# Patient Record
Sex: FEMALE | Race: WHITE | Employment: OTHER | URBAN - METROPOLITAN AREA
[De-identification: names, ages, dates, MRNs, and addresses within clinical notes are randomized per-mention and may not be internally consistent; named-entity substitution may affect disease eponyms.]

---

## 2017-01-31 DIAGNOSIS — I10 HTN (HYPERTENSION), BENIGN: ICD-10-CM

## 2017-01-31 RX ORDER — METOPROLOL SUCCINATE 25 MG/1
TABLET, EXTENDED RELEASE ORAL
Qty: 90 TABLET | Refills: 0 | Status: SHIPPED | OUTPATIENT
Start: 2017-01-31 | End: 2017-03-17 | Stop reason: SDUPTHER

## 2017-01-31 RX ORDER — THYROID 30 MG/1
TABLET ORAL
Qty: 90 TABLET | Refills: 0 | Status: SHIPPED | OUTPATIENT
Start: 2017-01-31 | End: 2017-03-17 | Stop reason: SDUPTHER

## 2017-03-01 DIAGNOSIS — E78.2 MIXED HYPERLIPIDEMIA: ICD-10-CM

## 2017-03-02 RX ORDER — SIMVASTATIN 10 MG
TABLET ORAL
Qty: 30 TABLET | Refills: 5 | Status: SHIPPED | OUTPATIENT
Start: 2017-03-02 | End: 2017-03-17 | Stop reason: SDUPTHER

## 2017-03-17 ENCOUNTER — HOSPITAL ENCOUNTER (OUTPATIENT)
Dept: CT IMAGING | Age: 62
Discharge: HOME | End: 2017-03-17
Payer: COMMERCIAL

## 2017-03-17 ENCOUNTER — OFFICE VISIT (OUTPATIENT)
Dept: PRIMARY CARE CLINIC | Age: 62
End: 2017-03-17

## 2017-03-17 VITALS
HEIGHT: 62 IN | RESPIRATION RATE: 14 BRPM | BODY MASS INDEX: 31.83 KG/M2 | SYSTOLIC BLOOD PRESSURE: 118 MMHG | HEART RATE: 72 BPM | WEIGHT: 173 LBS | DIASTOLIC BLOOD PRESSURE: 74 MMHG | TEMPERATURE: 98.3 F

## 2017-03-17 DIAGNOSIS — R10.12 LEFT UPPER QUADRANT PAIN: Primary | ICD-10-CM

## 2017-03-17 DIAGNOSIS — I10 HTN (HYPERTENSION), BENIGN: ICD-10-CM

## 2017-03-17 DIAGNOSIS — F51.01 PRIMARY INSOMNIA: ICD-10-CM

## 2017-03-17 DIAGNOSIS — H53.8 BLURRY VISION: ICD-10-CM

## 2017-03-17 DIAGNOSIS — R10.12 LEFT UPPER QUADRANT PAIN: ICD-10-CM

## 2017-03-17 DIAGNOSIS — E78.2 MIXED HYPERLIPIDEMIA: ICD-10-CM

## 2017-03-17 DIAGNOSIS — E03.9 HYPOTHYROIDISM (ACQUIRED): ICD-10-CM

## 2017-03-17 DIAGNOSIS — G58.9 MONONEUROPATHY: ICD-10-CM

## 2017-03-17 DIAGNOSIS — F32.0 MILD SINGLE CURRENT EPISODE OF MAJOR DEPRESSIVE DISORDER (HCC): ICD-10-CM

## 2017-03-17 DIAGNOSIS — R73.9 HYPERGLYCEMIA: ICD-10-CM

## 2017-03-17 DIAGNOSIS — E55.9 VITAMIN D DEFICIENCY: ICD-10-CM

## 2017-03-17 LAB — GLUCOSE BLD-MCNC: 99 MG/DL

## 2017-03-17 PROCEDURE — 1036F TOBACCO NON-USER: CPT | Performed by: FAMILY MEDICINE

## 2017-03-17 PROCEDURE — 99173 VISUAL ACUITY SCREEN: CPT | Performed by: FAMILY MEDICINE

## 2017-03-17 PROCEDURE — G8484 FLU IMMUNIZE NO ADMIN: HCPCS | Performed by: FAMILY MEDICINE

## 2017-03-17 PROCEDURE — 3017F COLORECTAL CA SCREEN DOC REV: CPT | Performed by: FAMILY MEDICINE

## 2017-03-17 PROCEDURE — 82962 GLUCOSE BLOOD TEST: CPT | Performed by: FAMILY MEDICINE

## 2017-03-17 PROCEDURE — G8427 DOCREV CUR MEDS BY ELIG CLIN: HCPCS | Performed by: FAMILY MEDICINE

## 2017-03-17 PROCEDURE — 74176 CT ABD & PELVIS W/O CONTRAST: CPT

## 2017-03-17 PROCEDURE — 2500000003 HC RX 250 WO HCPCS: Performed by: RADIOLOGY

## 2017-03-17 PROCEDURE — G8598 ASA/ANTIPLAT THER USED: HCPCS | Performed by: FAMILY MEDICINE

## 2017-03-17 PROCEDURE — 3014F SCREEN MAMMO DOC REV: CPT | Performed by: FAMILY MEDICINE

## 2017-03-17 PROCEDURE — 99214 OFFICE O/P EST MOD 30 MIN: CPT | Performed by: FAMILY MEDICINE

## 2017-03-17 PROCEDURE — G8419 CALC BMI OUT NRM PARAM NOF/U: HCPCS | Performed by: FAMILY MEDICINE

## 2017-03-17 RX ORDER — TRIAMTERENE AND HYDROCHLOROTHIAZIDE 75; 50 MG/1; MG/1
TABLET ORAL
Qty: 90 TABLET | Refills: 1 | Status: SHIPPED | OUTPATIENT
Start: 2017-03-17 | End: 2018-04-09 | Stop reason: SDUPTHER

## 2017-03-17 RX ORDER — ERGOCALCIFEROL 1.25 MG/1
50000 CAPSULE ORAL WEEKLY
Qty: 12 CAPSULE | Refills: 0 | Status: SHIPPED | OUTPATIENT
Start: 2017-03-17 | End: 2018-04-09 | Stop reason: SDUPTHER

## 2017-03-17 RX ORDER — ZOLPIDEM TARTRATE 5 MG/1
5 TABLET ORAL NIGHTLY PRN
Qty: 90 TABLET | Refills: 1 | Status: SHIPPED | OUTPATIENT
Start: 2017-03-17 | End: 2017-10-25 | Stop reason: SDUPTHER

## 2017-03-17 RX ORDER — SIMVASTATIN 10 MG
TABLET ORAL
Qty: 90 TABLET | Refills: 1 | Status: SHIPPED | OUTPATIENT
Start: 2017-03-17 | End: 2017-10-02 | Stop reason: SDUPTHER

## 2017-03-17 RX ORDER — METOPROLOL SUCCINATE 25 MG/1
TABLET, EXTENDED RELEASE ORAL
Qty: 90 TABLET | Refills: 1 | Status: SHIPPED | OUTPATIENT
Start: 2017-03-17 | End: 2017-11-07 | Stop reason: SDUPTHER

## 2017-03-17 RX ORDER — GABAPENTIN 600 MG/1
600 TABLET ORAL DAILY
Qty: 90 TABLET | Refills: 1 | Status: SHIPPED | OUTPATIENT
Start: 2017-03-17 | End: 2017-05-22 | Stop reason: SDUPTHER

## 2017-03-17 RX ORDER — LEVOTHYROXINE AND LIOTHYRONINE 19; 4.5 UG/1; UG/1
TABLET ORAL
Qty: 90 TABLET | Refills: 1 | Status: SHIPPED | OUTPATIENT
Start: 2017-03-17 | End: 2017-10-02 | Stop reason: SDUPTHER

## 2017-03-17 RX ADMIN — BARIUM SULFATE 450 ML: 21 SUSPENSION ORAL at 13:30

## 2017-03-17 ASSESSMENT — ENCOUNTER SYMPTOMS
PHOTOPHOBIA: 0
CONSTIPATION: 0
EYE REDNESS: 0
ABDOMINAL PAIN: 1
WHEEZING: 0
ABDOMINAL DISTENTION: 0
DIARRHEA: 1
EYE ITCHING: 0
VOMITING: 0
RESPIRATORY NEGATIVE: 1
BACK PAIN: 0
SHORTNESS OF BREATH: 0
NAUSEA: 0

## 2017-03-24 ENCOUNTER — TELEPHONE (OUTPATIENT)
Dept: PRIMARY CARE CLINIC | Age: 62
End: 2017-03-24

## 2017-03-24 DIAGNOSIS — R19.7 DIARRHEA, UNSPECIFIED TYPE: ICD-10-CM

## 2017-03-24 DIAGNOSIS — N28.1 RENAL CYST, LEFT: Primary | ICD-10-CM

## 2017-03-30 ENCOUNTER — HOSPITAL ENCOUNTER (OUTPATIENT)
Dept: ULTRASOUND IMAGING | Age: 62
Discharge: HOME | End: 2017-03-30
Payer: COMMERCIAL

## 2017-03-30 DIAGNOSIS — N28.1 RENAL CYST, LEFT: ICD-10-CM

## 2017-03-30 PROCEDURE — 76705 ECHO EXAM OF ABDOMEN: CPT

## 2017-04-03 ENCOUNTER — TELEPHONE (OUTPATIENT)
Dept: PRIMARY CARE CLINIC | Age: 62
End: 2017-04-03

## 2017-04-05 RX ORDER — PREDNISONE 10 MG/1
TABLET ORAL
Qty: 24 TABLET | Refills: 0 | Status: SHIPPED | OUTPATIENT
Start: 2017-04-05 | End: 2017-04-19

## 2017-04-18 ENCOUNTER — HOSPITAL ENCOUNTER (OUTPATIENT)
Dept: LAB | Age: 62
Discharge: HOME | End: 2017-04-18
Payer: COMMERCIAL

## 2017-04-18 DIAGNOSIS — E78.2 MIXED HYPERLIPIDEMIA: ICD-10-CM

## 2017-04-18 LAB
ALBUMIN SERPL-MCNC: 4 G/DL (ref 3.9–4.9)
ALP BLD-CCNC: 82 U/L (ref 40–130)
ALT SERPL-CCNC: 19 U/L (ref 0–33)
ANION GAP SERPL CALCULATED.3IONS-SCNC: 13 MEQ/L (ref 7–13)
AST SERPL-CCNC: 15 U/L (ref 0–35)
BILIRUB SERPL-MCNC: 0.3 MG/DL (ref 0–1.2)
BUN BLDV-MCNC: 25 MG/DL (ref 8–23)
CALCIUM SERPL-MCNC: 9.2 MG/DL (ref 8.6–10.2)
CHLORIDE BLD-SCNC: 102 MEQ/L (ref 98–107)
CHOLESTEROL, TOTAL: 215 MG/DL (ref 0–199)
CO2: 28 MEQ/L (ref 22–29)
CREAT SERPL-MCNC: 1.08 MG/DL (ref 0.5–0.9)
GFR AFRICAN AMERICAN: >60
GFR NON-AFRICAN AMERICAN: 51.5
GLOBULIN: 3 G/DL (ref 2.3–3.5)
GLUCOSE BLD-MCNC: 101 MG/DL (ref 74–109)
HDLC SERPL-MCNC: 72 MG/DL (ref 40–59)
LDL CHOLESTEROL CALCULATED: 103 MG/DL (ref 0–129)
POTASSIUM SERPL-SCNC: 3.4 MEQ/L (ref 3.5–5.1)
SODIUM BLD-SCNC: 143 MEQ/L (ref 132–144)
TOTAL PROTEIN: 7 G/DL (ref 6.4–8.1)
TRIGL SERPL-MCNC: 201 MG/DL (ref 0–200)

## 2017-04-18 PROCEDURE — 80061 LIPID PANEL: CPT

## 2017-04-18 PROCEDURE — 36415 COLL VENOUS BLD VENIPUNCTURE: CPT

## 2017-04-18 PROCEDURE — 80053 COMPREHEN METABOLIC PANEL: CPT

## 2017-05-22 DIAGNOSIS — G58.9 MONONEUROPATHY: ICD-10-CM

## 2017-05-22 RX ORDER — GABAPENTIN 600 MG/1
1200 TABLET ORAL 3 TIMES DAILY
Qty: 540 TABLET | Refills: 1 | Status: SHIPPED | OUTPATIENT
Start: 2017-05-22 | End: 2018-04-09 | Stop reason: SDUPTHER

## 2017-07-27 ENCOUNTER — INITIAL CONSULT (OUTPATIENT)
Dept: SURGERY | Age: 62
End: 2017-07-27

## 2017-07-27 VITALS
DIASTOLIC BLOOD PRESSURE: 70 MMHG | HEART RATE: 84 BPM | RESPIRATION RATE: 14 BRPM | TEMPERATURE: 98.9 F | WEIGHT: 163 LBS | BODY MASS INDEX: 30 KG/M2 | SYSTOLIC BLOOD PRESSURE: 130 MMHG | HEIGHT: 62 IN

## 2017-07-27 DIAGNOSIS — D12.6 ADENOMATOUS COLON POLYP: Primary | ICD-10-CM

## 2017-07-27 PROCEDURE — G8598 ASA/ANTIPLAT THER USED: HCPCS | Performed by: SURGERY

## 2017-07-27 PROCEDURE — 1036F TOBACCO NON-USER: CPT | Performed by: SURGERY

## 2017-07-27 PROCEDURE — G8419 CALC BMI OUT NRM PARAM NOF/U: HCPCS | Performed by: SURGERY

## 2017-07-27 PROCEDURE — 99213 OFFICE O/P EST LOW 20 MIN: CPT | Performed by: SURGERY

## 2017-07-27 PROCEDURE — 3014F SCREEN MAMMO DOC REV: CPT | Performed by: SURGERY

## 2017-07-27 PROCEDURE — 3017F COLORECTAL CA SCREEN DOC REV: CPT | Performed by: SURGERY

## 2017-07-27 PROCEDURE — G8428 CUR MEDS NOT DOCUMENT: HCPCS | Performed by: SURGERY

## 2017-07-27 RX ORDER — ELETRIPTAN HYDROBROMIDE 40 MG/1
40 TABLET, FILM COATED ORAL
COMMUNITY
End: 2018-04-09 | Stop reason: SDUPTHER

## 2017-07-27 ASSESSMENT — ENCOUNTER SYMPTOMS
CHEST TIGHTNESS: 0
EYE PAIN: 0
EYE DISCHARGE: 0
COLOR CHANGE: 0
ABDOMINAL PAIN: 0
CONSTIPATION: 0
BACK PAIN: 0
VOMITING: 0
ANAL BLEEDING: 0
TROUBLE SWALLOWING: 0
STRIDOR: 0
SHORTNESS OF BREATH: 0

## 2017-08-01 RX ORDER — SODIUM, POTASSIUM,MAG SULFATES 17.5-3.13G
354 SOLUTION, RECONSTITUTED, ORAL ORAL SEE ADMIN INSTRUCTIONS
Qty: 1 BOTTLE | Refills: 0 | Status: SHIPPED | OUTPATIENT
Start: 2017-08-01 | End: 2017-11-10

## 2017-09-20 LAB — PATHOLOGY REPORT: NORMAL

## 2017-10-02 DIAGNOSIS — E78.2 MIXED HYPERLIPIDEMIA: ICD-10-CM

## 2017-10-02 DIAGNOSIS — E03.9 HYPOTHYROIDISM (ACQUIRED): ICD-10-CM

## 2017-10-02 RX ORDER — SIMVASTATIN 10 MG
TABLET ORAL
Qty: 90 TABLET | Refills: 1 | Status: SHIPPED | OUTPATIENT
Start: 2017-10-02 | End: 2018-04-09 | Stop reason: SDUPTHER

## 2017-10-02 RX ORDER — LEVOTHYROXINE AND LIOTHYRONINE 19; 4.5 UG/1; UG/1
TABLET ORAL
Qty: 90 TABLET | Refills: 1 | Status: SHIPPED | OUTPATIENT
Start: 2017-10-02 | End: 2018-04-09 | Stop reason: SDUPTHER

## 2017-10-02 NOTE — TELEPHONE ENCOUNTER
From: Dominguez Gonzalez  To: Sheena Cabot, DO  Sent: 10/1/2017 6:47 PM EDT  Subject: Medication Renewal Request    Original authorizing provider: Sheena Cabot, DO Encarnacion Lacks. Plas would like a refill of the following medications:  simvastatin (ZOCOR) 10 MG tablet Sheena Cabot, DO]  thyroid (ARMOUR THYROID) 30 MG tablet Sheena Cabot, DO]    Preferred pharmacy: [address] [place] [address] [phone] F [phone]    Comment:

## 2017-10-25 ENCOUNTER — OFFICE VISIT (OUTPATIENT)
Dept: PRIMARY CARE CLINIC | Age: 62
End: 2017-10-25

## 2017-10-25 VITALS
SYSTOLIC BLOOD PRESSURE: 120 MMHG | TEMPERATURE: 97.9 F | BODY MASS INDEX: 32.02 KG/M2 | WEIGHT: 174 LBS | HEART RATE: 60 BPM | HEIGHT: 62 IN | DIASTOLIC BLOOD PRESSURE: 60 MMHG | RESPIRATION RATE: 18 BRPM

## 2017-10-25 DIAGNOSIS — I10 HTN (HYPERTENSION), BENIGN: ICD-10-CM

## 2017-10-25 DIAGNOSIS — R10.12 LEFT UPPER QUADRANT PAIN: ICD-10-CM

## 2017-10-25 DIAGNOSIS — F51.01 PRIMARY INSOMNIA: Primary | ICD-10-CM

## 2017-10-25 DIAGNOSIS — Z12.31 SCREENING MAMMOGRAM, ENCOUNTER FOR: ICD-10-CM

## 2017-10-25 DIAGNOSIS — Z20.5 EXPOSURE TO HEPATITIS C: ICD-10-CM

## 2017-10-25 DIAGNOSIS — Z23 NEED FOR INFLUENZA VACCINATION: ICD-10-CM

## 2017-10-25 DIAGNOSIS — F32.0 MILD SINGLE CURRENT EPISODE OF MAJOR DEPRESSIVE DISORDER (HCC): ICD-10-CM

## 2017-10-25 LAB — HEPATITIS C ANTIBODY INTERPRETATION: NORMAL

## 2017-10-25 PROCEDURE — 90688 IIV4 VACCINE SPLT 0.5 ML IM: CPT | Performed by: FAMILY MEDICINE

## 2017-10-25 PROCEDURE — 3014F SCREEN MAMMO DOC REV: CPT | Performed by: FAMILY MEDICINE

## 2017-10-25 PROCEDURE — G8417 CALC BMI ABV UP PARAM F/U: HCPCS | Performed by: FAMILY MEDICINE

## 2017-10-25 PROCEDURE — G8484 FLU IMMUNIZE NO ADMIN: HCPCS | Performed by: FAMILY MEDICINE

## 2017-10-25 PROCEDURE — 90471 IMMUNIZATION ADMIN: CPT | Performed by: FAMILY MEDICINE

## 2017-10-25 PROCEDURE — G8427 DOCREV CUR MEDS BY ELIG CLIN: HCPCS | Performed by: FAMILY MEDICINE

## 2017-10-25 PROCEDURE — 3017F COLORECTAL CA SCREEN DOC REV: CPT | Performed by: FAMILY MEDICINE

## 2017-10-25 PROCEDURE — 99214 OFFICE O/P EST MOD 30 MIN: CPT | Performed by: FAMILY MEDICINE

## 2017-10-25 PROCEDURE — 1036F TOBACCO NON-USER: CPT | Performed by: FAMILY MEDICINE

## 2017-10-25 PROCEDURE — G8598 ASA/ANTIPLAT THER USED: HCPCS | Performed by: FAMILY MEDICINE

## 2017-10-25 RX ORDER — ZOLPIDEM TARTRATE 5 MG/1
5 TABLET ORAL NIGHTLY PRN
Qty: 90 TABLET | Refills: 1 | Status: SHIPPED | OUTPATIENT
Start: 2017-10-25 | End: 2018-04-09 | Stop reason: SDUPTHER

## 2017-10-25 ASSESSMENT — ENCOUNTER SYMPTOMS
VOMITING: 0
SINUS PRESSURE: 0
WHEEZING: 0
RESPIRATORY NEGATIVE: 1
CONSTIPATION: 0
SHORTNESS OF BREATH: 0
BACK PAIN: 0
SORE THROAT: 0
ABDOMINAL PAIN: 0
COUGH: 0
DIARRHEA: 0
NAUSEA: 0

## 2017-10-25 ASSESSMENT — PATIENT HEALTH QUESTIONNAIRE - PHQ9
SUM OF ALL RESPONSES TO PHQ9 QUESTIONS 1 & 2: 0
SUM OF ALL RESPONSES TO PHQ QUESTIONS 1-9: 0
2. FEELING DOWN, DEPRESSED OR HOPELESS: 0
1. LITTLE INTEREST OR PLEASURE IN DOING THINGS: 0

## 2017-10-25 NOTE — PROGRESS NOTES
Subjective  Desirae Tenorio, 65 y.o. female presents 10/25/2017 with  Chief Complaint   Patient presents with    Insomnia     Ambien follow up .pt states the medication is working well for her at this time and has no c/o side effects    Vaccine Information Sheet, \"Influenza - Inactivated\"  given to Desirae Tenorio, or parent/legal guardian of  Desirae Tenorio and verbalized understanding. Patient responses:    Have you ever had a reaction to a flu vaccine? No  Are you able to eat eggs without adverse effects? Yes  Do you have any current illness? No  Have you ever had Guillian Freistatt Syndrome? No    Flu vaccine given per order. Please see immunization tab. HPI  Patient comes in for follow-up on insomnia. She admits that Ambien is working well for her and she would like to continue the present dose. She denies any fever, chills, nausea, emesis, dull pain, shortness of breath or chest pain. Patient denies any lightheadedness, dizziness/vertigo or night sweats. HPI    Patient Histories  Past Medical History:   Diagnosis Date    Bronchitis 2014    Carotid stenosis     Eczema 2014    Endometriosis     Headache(784.0)     Hyperlipidemia     Hypertension     Hypothyroidism     Menetrier disease     Osteoarthritis      Past Surgical History:   Procedure Laterality Date     SECTION      CHOLECYSTECTOMY      COLONOSCOPY  14    DR. Linh Ibrahim    COLONOSCOPY  2017    DR Linh Ibrahim    HEEL SPUR SURGERY  2014    HERNIA REPAIR      HYSTERECTOMY      jayme    UPPER GASTROINTESTINAL ENDOSCOPY  11/15/2013    DR. MCMAHON     Allergies   Allergen Reactions    Iodides Other (See Comments)     Vomiting, rash. Cannot have iodine on skin    Iodine Nausea Only and Rash     Social History     Social History    Marital status:      Spouse name: N/A    Number of children: N/A    Years of education: N/A     Occupational History    Not on file.      Social History Main Topics    Smoking status: Never Smoker    Smokeless tobacco: Never Used    Alcohol use Yes    Drug use: No    Sexual activity: Not on file     Other Topics Concern    Not on file     Social History Narrative    No narrative on file     Family History   Problem Relation Age of Onset    Heart Disease Mother     Stroke Mother     Lupus Mother     Diabetes Mother     Heart Disease Father     Stroke Father     Diabetes Father     Cancer Sister      ovarian cancer    Sudden Death Brother      suicide     Current Outpatient Prescriptions on File Prior to Visit   Medication Sig Dispense Refill    simvastatin (ZOCOR) 10 MG tablet take 1 tablet by mouth every evening 90 tablet 1    thyroid (ARMOUR THYROID) 30 MG tablet take 1 tablet by mouth once daily 90 tablet 1    gabapentin (NEURONTIN) 600 MG tablet Take 2 tablets by mouth 3 times daily 540 tablet 1    metoprolol succinate (TOPROL XL) 25 MG extended release tablet take 1 tablet by mouth once daily 90 tablet 1    triamterene-hydrochlorothiazide (MAXZIDE) 75-50 MG per tablet Take one(1) tablet daily. 90 tablet 1    aspirin 81 MG tablet Take 81 mg by mouth.  Na Sulfate-K Sulfate-Mg Sulf (SUPREP BOWEL PREP KIT) 17.5-3.13-1.6 GM/180ML SOLN Take 354 mLs by mouth See Admin Instructions Take one kit for prep for one day. 1 Bottle 0    eletriptan (RELPAX) 40 MG tablet Take 40 mg by mouth once as needed may repeat in 2 hours if necessary      vitamin D (ERGOCALCIFEROL) 64826 UNITS CAPS capsule Take 1 capsule by mouth once a week 12 capsule 0    clobetasol (TEMOVATE) 0.05 % cream Apply topically 2 times daily. 60 g 1     No current facility-administered medications on file prior to visit. Review of Systems   Constitutional: Negative for chills, fatigue and fever. HENT: Negative for congestion, ear pain, sinus pressure and sore throat. Respiratory: Negative. Negative for cough, shortness of breath and wheezing.     Cardiovascular: Negative

## 2017-10-27 LAB — HIV-1 WESTERN BLOT: NEGATIVE

## 2017-11-06 ENCOUNTER — HOSPITAL ENCOUNTER (OUTPATIENT)
Dept: WOMENS IMAGING | Age: 62
Discharge: HOME | End: 2017-11-06
Payer: COMMERCIAL

## 2017-11-06 DIAGNOSIS — Z12.31 SCREENING MAMMOGRAM, ENCOUNTER FOR: ICD-10-CM

## 2017-11-06 PROCEDURE — G0202 SCR MAMMO BI INCL CAD: HCPCS

## 2017-11-07 DIAGNOSIS — I10 HTN (HYPERTENSION), BENIGN: ICD-10-CM

## 2017-11-07 RX ORDER — METOPROLOL SUCCINATE 25 MG/1
TABLET, EXTENDED RELEASE ORAL
Qty: 90 TABLET | Refills: 1 | Status: SHIPPED | OUTPATIENT
Start: 2017-11-07 | End: 2018-04-09 | Stop reason: SDUPTHER

## 2017-11-10 ENCOUNTER — OFFICE VISIT (OUTPATIENT)
Dept: SURGERY | Age: 62
End: 2017-11-10

## 2017-11-10 VITALS
HEIGHT: 62 IN | TEMPERATURE: 96.6 F | BODY MASS INDEX: 31.47 KG/M2 | WEIGHT: 171 LBS | DIASTOLIC BLOOD PRESSURE: 68 MMHG | SYSTOLIC BLOOD PRESSURE: 120 MMHG

## 2017-11-10 DIAGNOSIS — R10.12 LEFT UPPER QUADRANT PAIN: Primary | ICD-10-CM

## 2017-11-10 PROCEDURE — 99202 OFFICE O/P NEW SF 15 MIN: CPT | Performed by: SURGERY

## 2017-11-10 PROCEDURE — G8598 ASA/ANTIPLAT THER USED: HCPCS | Performed by: SURGERY

## 2017-11-10 PROCEDURE — G8484 FLU IMMUNIZE NO ADMIN: HCPCS | Performed by: SURGERY

## 2017-11-10 PROCEDURE — 3017F COLORECTAL CA SCREEN DOC REV: CPT | Performed by: SURGERY

## 2017-11-10 PROCEDURE — G8427 DOCREV CUR MEDS BY ELIG CLIN: HCPCS | Performed by: SURGERY

## 2017-11-10 PROCEDURE — G8417 CALC BMI ABV UP PARAM F/U: HCPCS | Performed by: SURGERY

## 2017-11-10 PROCEDURE — 1036F TOBACCO NON-USER: CPT | Performed by: SURGERY

## 2017-11-10 PROCEDURE — 3014F SCREEN MAMMO DOC REV: CPT | Performed by: SURGERY

## 2017-11-10 NOTE — PROGRESS NOTES
Subjective:      Patient ID: Madison Arias is a 65 y.o. female. HPI Madison Arias is a 65 y.o. female referred by Dr Liz Gonsalves for  constant  LUQ abdominal pain. The pain is aching and dull and is 7/10 in intensity. The pain started 9 months ago  Aggravating factors: pressure  Alleviating factors: none  Associated symptoms: diarrhea  Abdominal mass is not noted by the patient. Weight loss is not noted. Fever is not noted. CT abd on 3/17/2017 shows   THERE IS A 9 MM AREA OF INCREASED ATTENUATION IN THE INTERPOLE REGION OF THE LEFT KIDNEY. IT IS NOT FULLY CHARACTERIZED ON THIS NONCONTRAST STUDY. RECOMMEND ULTRASOUND TO FURTHER EVALUATE. DIVERTICULOSIS. PROBABLE HEPATIC CYST LATERAL SEGMENT LEFT LOBE OF THE LIVER. Abdominal ultrasound on 3/30/2017 shows HEPATIC CYSTS. LEFT RENAL CYST. HEPATIC STEATOSIS. NO ACUTE PATHOLOGY  Colonoscopy was done 2 months ago. EGD was not done. She has had previous abdominal surgery. Review of Systems    Objective:   Physical Exam   Constitutional: She is oriented to person, place, and time. She appears well-developed and well-nourished. No distress. Abdominal: There is no hepatosplenomegaly. There is tenderness in the left upper quadrant. There is no rigidity, no rebound and no guarding. Hernia confirmed negative in the ventral area. Musculoskeletal:   Normal gait   Neurological: She is alert and oriented to person, place, and time. Psychiatric: She has a normal mood and affect. Judgment normal.     /68   Temp 96.6 °F (35.9 °C) (Temporal)   Ht 5' 2\" (1.575 m)   Wt 171 lb (77.6 kg)   LMP  (LMP Unknown)   BMI 31.28 kg/m²    Assessment:      LUQ abdominal pain, probable musculoskeletal  No evidence of hernias or intra abdominal pathology      Plan:      She was offered repeat studies since her current studies are 9 months old and a possible pain management consult. She wishes to observe for now.   Return to see me as needed

## 2018-03-09 ENCOUNTER — OFFICE VISIT (OUTPATIENT)
Dept: PRIMARY CARE CLINIC | Age: 63
End: 2018-03-09
Payer: COMMERCIAL

## 2018-03-09 VITALS
OXYGEN SATURATION: 98 % | TEMPERATURE: 97.6 F | WEIGHT: 173 LBS | BODY MASS INDEX: 31.83 KG/M2 | DIASTOLIC BLOOD PRESSURE: 72 MMHG | HEIGHT: 62 IN | SYSTOLIC BLOOD PRESSURE: 110 MMHG | HEART RATE: 75 BPM | RESPIRATION RATE: 16 BRPM

## 2018-03-09 DIAGNOSIS — J01.00 ACUTE MAXILLARY SINUSITIS, RECURRENCE NOT SPECIFIED: Primary | ICD-10-CM

## 2018-03-09 PROCEDURE — 99213 OFFICE O/P EST LOW 20 MIN: CPT | Performed by: NURSE PRACTITIONER

## 2018-03-09 RX ORDER — AMOXICILLIN AND CLAVULANATE POTASSIUM 875; 125 MG/1; MG/1
1 TABLET, FILM COATED ORAL 2 TIMES DAILY
Qty: 20 TABLET | Refills: 0 | Status: SHIPPED | OUTPATIENT
Start: 2018-03-09 | End: 2018-03-19

## 2018-03-09 ASSESSMENT — ENCOUNTER SYMPTOMS
SINUS PRESSURE: 1
SWOLLEN GLANDS: 0
HOARSE VOICE: 0
SORE THROAT: 0
COUGH: 1
SHORTNESS OF BREATH: 0

## 2018-03-09 NOTE — PROGRESS NOTES
Subjective:      Patient ID: Guanako Prasad is a 59 y.o. female who presents today for:  Chief Complaint   Patient presents with    Sinusitis     x3 weeks pt has c.o sinus pressure, drainage, headache, cough, wheezing, and congestion. Sinusitis   This is a new problem. Episode onset: x 4 weeks. There has been no fever. Associated symptoms include congestion, coughing, headaches and sinus pressure. Pertinent negatives include no chills, diaphoresis, ear pain, hoarse voice, neck pain, shortness of breath, sneezing, sore throat or swollen glands. Past treatments include nothing. Past Medical History:   Diagnosis Date    Bronchitis 4/29/2014    Carotid stenosis     Eczema 4/29/2014    Endometriosis     Headache(784.0)     Hyperlipidemia     Hypertension     Hypothyroidism     Menetrier disease     Osteoarthritis      Current Outpatient Prescriptions on File Prior to Visit   Medication Sig Dispense Refill    metoprolol succinate (TOPROL XL) 25 MG extended release tablet take 1 tablet by mouth once daily 90 tablet 1    zolpidem (AMBIEN) 5 MG tablet Take 1 tablet by mouth nightly as needed for Sleep 90 tablet 1    sertraline (ZOLOFT) 50 MG tablet take 1 tablet by mouth once daily 90 tablet 1    simvastatin (ZOCOR) 10 MG tablet take 1 tablet by mouth every evening 90 tablet 1    thyroid (ARMOUR THYROID) 30 MG tablet take 1 tablet by mouth once daily 90 tablet 1    eletriptan (RELPAX) 40 MG tablet Take 40 mg by mouth once as needed may repeat in 2 hours if necessary      gabapentin (NEURONTIN) 600 MG tablet Take 2 tablets by mouth 3 times daily 540 tablet 1    triamterene-hydrochlorothiazide (MAXZIDE) 75-50 MG per tablet Take one(1) tablet daily. 90 tablet 1    vitamin D (ERGOCALCIFEROL) 31031 UNITS CAPS capsule Take 1 capsule by mouth once a week 12 capsule 0    clobetasol (TEMOVATE) 0.05 % cream Apply topically 2 times daily. 60 g 1    aspirin 81 MG tablet Take 81 mg by mouth.        No

## 2018-04-09 ENCOUNTER — OFFICE VISIT (OUTPATIENT)
Dept: PRIMARY CARE CLINIC | Age: 63
End: 2018-04-09
Payer: COMMERCIAL

## 2018-04-09 VITALS
OXYGEN SATURATION: 95 % | DIASTOLIC BLOOD PRESSURE: 70 MMHG | WEIGHT: 178.9 LBS | HEIGHT: 62 IN | SYSTOLIC BLOOD PRESSURE: 136 MMHG | RESPIRATION RATE: 14 BRPM | TEMPERATURE: 97.6 F | BODY MASS INDEX: 32.92 KG/M2 | HEART RATE: 60 BPM

## 2018-04-09 DIAGNOSIS — E78.2 MIXED HYPERLIPIDEMIA: ICD-10-CM

## 2018-04-09 DIAGNOSIS — F32.0 MILD SINGLE CURRENT EPISODE OF MAJOR DEPRESSIVE DISORDER (HCC): ICD-10-CM

## 2018-04-09 DIAGNOSIS — E66.09 CLASS 1 OBESITY DUE TO EXCESS CALORIES WITH SERIOUS COMORBIDITY AND BODY MASS INDEX (BMI) OF 32.0 TO 32.9 IN ADULT: ICD-10-CM

## 2018-04-09 DIAGNOSIS — I10 HTN (HYPERTENSION), BENIGN: ICD-10-CM

## 2018-04-09 DIAGNOSIS — F51.01 PRIMARY INSOMNIA: ICD-10-CM

## 2018-04-09 DIAGNOSIS — E03.9 HYPOTHYROIDISM (ACQUIRED): ICD-10-CM

## 2018-04-09 DIAGNOSIS — R53.83 FATIGUE, UNSPECIFIED TYPE: ICD-10-CM

## 2018-04-09 DIAGNOSIS — E55.9 VITAMIN D DEFICIENCY: ICD-10-CM

## 2018-04-09 DIAGNOSIS — R06.02 SOB (SHORTNESS OF BREATH): ICD-10-CM

## 2018-04-09 DIAGNOSIS — Z00.00 ANNUAL PHYSICAL EXAM: Primary | ICD-10-CM

## 2018-04-09 DIAGNOSIS — G58.9 MONONEUROPATHY: ICD-10-CM

## 2018-04-09 LAB
ALBUMIN SERPL-MCNC: 3.7 G/DL (ref 3.9–4.9)
ALP BLD-CCNC: 92 U/L (ref 40–130)
ALT SERPL-CCNC: 20 U/L (ref 0–33)
ANION GAP SERPL CALCULATED.3IONS-SCNC: 12 MEQ/L (ref 7–13)
AST SERPL-CCNC: 21 U/L (ref 0–35)
BASOPHILS ABSOLUTE: 0 K/UL (ref 0–0.2)
BASOPHILS RELATIVE PERCENT: 0.4 %
BILIRUB SERPL-MCNC: 0.3 MG/DL (ref 0–1.2)
BILIRUBIN, POC: NORMAL
BLOOD URINE, POC: NORMAL
BUN BLDV-MCNC: 19 MG/DL (ref 8–23)
CALCIUM SERPL-MCNC: 9.1 MG/DL (ref 8.6–10.2)
CHLORIDE BLD-SCNC: 103 MEQ/L (ref 98–107)
CHOLESTEROL, TOTAL: 175 MG/DL (ref 0–199)
CLARITY, POC: CLEAR
CO2: 28 MEQ/L (ref 22–29)
COLOR, POC: YELLOW
CREAT SERPL-MCNC: 0.9 MG/DL (ref 0.5–0.9)
EOSINOPHILS ABSOLUTE: 0.8 K/UL (ref 0–0.7)
EOSINOPHILS RELATIVE PERCENT: 11 %
GFR AFRICAN AMERICAN: >60
GFR NON-AFRICAN AMERICAN: >60
GLOBULIN: 2.7 G/DL (ref 2.3–3.5)
GLUCOSE BLD-MCNC: 91 MG/DL (ref 74–109)
GLUCOSE URINE, POC: NORMAL
HCT VFR BLD CALC: 37.6 % (ref 37–47)
HDLC SERPL-MCNC: 47 MG/DL (ref 40–59)
HEMOGLOBIN: 12.5 G/DL (ref 12–16)
KETONES, POC: NORMAL
LDL CHOLESTEROL CALCULATED: 84 MG/DL (ref 0–129)
LEUKOCYTE EST, POC: NORMAL
LYMPHOCYTES ABSOLUTE: 1.4 K/UL (ref 1–4.8)
LYMPHOCYTES RELATIVE PERCENT: 19.5 %
MCH RBC QN AUTO: 30.8 PG (ref 27–31.3)
MCHC RBC AUTO-ENTMCNC: 33.2 % (ref 33–37)
MCV RBC AUTO: 92.8 FL (ref 82–100)
MONOCYTES ABSOLUTE: 0.5 K/UL (ref 0.2–0.8)
MONOCYTES RELATIVE PERCENT: 7.5 %
NEUTROPHILS ABSOLUTE: 4.5 K/UL (ref 1.4–6.5)
NEUTROPHILS RELATIVE PERCENT: 61.6 %
NITRITE, POC: NORMAL
PDW BLD-RTO: 14.3 % (ref 11.5–14.5)
PH, POC: 6
PLATELET # BLD: 224 K/UL (ref 130–400)
POTASSIUM SERPL-SCNC: 4.1 MEQ/L (ref 3.5–5.1)
PROTEIN, POC: NORMAL
RBC # BLD: 4.06 M/UL (ref 4.2–5.4)
SODIUM BLD-SCNC: 143 MEQ/L (ref 132–144)
SPECIFIC GRAVITY, POC: 1.02
TOTAL PROTEIN: 6.4 G/DL (ref 6.4–8.1)
TRIGL SERPL-MCNC: 221 MG/DL (ref 0–200)
TSH SERPL DL<=0.05 MIU/L-ACNC: 2.66 UIU/ML (ref 0.27–4.2)
UROBILINOGEN, POC: NORMAL
VITAMIN D 25-HYDROXY: 22.6 NG/ML (ref 30–100)
WBC # BLD: 7.3 K/UL (ref 4.8–10.8)

## 2018-04-09 PROCEDURE — 93000 ELECTROCARDIOGRAM COMPLETE: CPT | Performed by: FAMILY MEDICINE

## 2018-04-09 PROCEDURE — 99396 PREV VISIT EST AGE 40-64: CPT | Performed by: FAMILY MEDICINE

## 2018-04-09 PROCEDURE — 81003 URINALYSIS AUTO W/O SCOPE: CPT | Performed by: FAMILY MEDICINE

## 2018-04-09 RX ORDER — SIMVASTATIN 10 MG
TABLET ORAL
Qty: 90 TABLET | Refills: 1 | Status: SHIPPED | OUTPATIENT
Start: 2018-04-09 | End: 2018-09-24 | Stop reason: SDUPTHER

## 2018-04-09 RX ORDER — ERGOCALCIFEROL 1.25 MG/1
50000 CAPSULE ORAL WEEKLY
Qty: 12 CAPSULE | Refills: 0 | Status: SHIPPED | OUTPATIENT
Start: 2018-04-09 | End: 2019-03-19 | Stop reason: SDUPTHER

## 2018-04-09 RX ORDER — TRIAMTERENE AND HYDROCHLOROTHIAZIDE 75; 50 MG/1; MG/1
TABLET ORAL
Qty: 90 TABLET | Refills: 1 | Status: SHIPPED | OUTPATIENT
Start: 2018-04-09 | End: 2018-09-24 | Stop reason: SDUPTHER

## 2018-04-09 RX ORDER — ZOLPIDEM TARTRATE 5 MG/1
5 TABLET ORAL NIGHTLY PRN
Qty: 90 TABLET | Refills: 1 | Status: SHIPPED | OUTPATIENT
Start: 2018-04-09 | End: 2018-08-17 | Stop reason: SDUPTHER

## 2018-04-09 RX ORDER — GABAPENTIN 600 MG/1
1200 TABLET ORAL 3 TIMES DAILY
Qty: 540 TABLET | Refills: 1 | Status: SHIPPED | OUTPATIENT
Start: 2018-04-09 | End: 2019-03-19

## 2018-04-09 RX ORDER — METOPROLOL SUCCINATE 25 MG/1
TABLET, EXTENDED RELEASE ORAL
Qty: 90 TABLET | Refills: 1 | Status: SHIPPED | OUTPATIENT
Start: 2018-04-09 | End: 2018-09-24 | Stop reason: SDUPTHER

## 2018-04-09 RX ORDER — LEVOTHYROXINE AND LIOTHYRONINE 19; 4.5 UG/1; UG/1
TABLET ORAL
Qty: 90 TABLET | Refills: 1 | Status: SHIPPED | OUTPATIENT
Start: 2018-04-09 | End: 2018-09-24 | Stop reason: SDUPTHER

## 2018-04-09 RX ORDER — ELETRIPTAN HYDROBROMIDE 40 MG/1
40 TABLET, FILM COATED ORAL
Qty: 6 TABLET | Refills: 1 | Status: SHIPPED | OUTPATIENT
Start: 2018-04-09 | End: 2018-08-17 | Stop reason: SDUPTHER

## 2018-04-09 ASSESSMENT — ENCOUNTER SYMPTOMS
SHORTNESS OF BREATH: 1
ABDOMINAL PAIN: 0
SWOLLEN GLANDS: 0
SPUTUM PRODUCTION: 0
WHEEZING: 0
VOMITING: 0
RHINORRHEA: 0
ORTHOPNEA: 0
HEMOPTYSIS: 0
SORE THROAT: 0

## 2018-04-13 ENCOUNTER — TELEPHONE (OUTPATIENT)
Dept: PRIMARY CARE CLINIC | Age: 63
End: 2018-04-13

## 2018-05-08 ENCOUNTER — OFFICE VISIT (OUTPATIENT)
Dept: PRIMARY CARE CLINIC | Age: 63
End: 2018-05-08
Payer: COMMERCIAL

## 2018-05-08 VITALS
WEIGHT: 170 LBS | SYSTOLIC BLOOD PRESSURE: 112 MMHG | OXYGEN SATURATION: 96 % | RESPIRATION RATE: 14 BRPM | HEIGHT: 62 IN | HEART RATE: 78 BPM | DIASTOLIC BLOOD PRESSURE: 58 MMHG | BODY MASS INDEX: 31.28 KG/M2 | TEMPERATURE: 97.6 F

## 2018-05-08 DIAGNOSIS — E03.4 HYPOTHYROIDISM DUE TO ACQUIRED ATROPHY OF THYROID: Primary | ICD-10-CM

## 2018-05-08 DIAGNOSIS — I10 HTN (HYPERTENSION), BENIGN: ICD-10-CM

## 2018-05-08 DIAGNOSIS — E78.2 MIXED HYPERLIPIDEMIA: ICD-10-CM

## 2018-05-08 PROCEDURE — 99214 OFFICE O/P EST MOD 30 MIN: CPT | Performed by: FAMILY MEDICINE

## 2018-05-08 PROCEDURE — 1036F TOBACCO NON-USER: CPT | Performed by: FAMILY MEDICINE

## 2018-05-08 PROCEDURE — G8427 DOCREV CUR MEDS BY ELIG CLIN: HCPCS | Performed by: FAMILY MEDICINE

## 2018-05-08 PROCEDURE — G8598 ASA/ANTIPLAT THER USED: HCPCS | Performed by: FAMILY MEDICINE

## 2018-05-08 PROCEDURE — G8417 CALC BMI ABV UP PARAM F/U: HCPCS | Performed by: FAMILY MEDICINE

## 2018-05-08 PROCEDURE — 3017F COLORECTAL CA SCREEN DOC REV: CPT | Performed by: FAMILY MEDICINE

## 2018-05-08 ASSESSMENT — ENCOUNTER SYMPTOMS
SPUTUM PRODUCTION: 0
BACK PAIN: 0
RHINORRHEA: 0
PHOTOPHOBIA: 0
EYE REDNESS: 0
EYE ITCHING: 0
CONSTIPATION: 0
DIARRHEA: 0
SORE THROAT: 0
SWOLLEN GLANDS: 0
SHORTNESS OF BREATH: 1
ORTHOPNEA: 0
ABDOMINAL PAIN: 0
GASTROINTESTINAL NEGATIVE: 1
VOMITING: 0
HEMOPTYSIS: 0
WHEEZING: 0
EYES NEGATIVE: 1

## 2018-05-29 ENCOUNTER — OFFICE VISIT (OUTPATIENT)
Dept: CARDIOLOGY CLINIC | Age: 63
End: 2018-05-29
Payer: COMMERCIAL

## 2018-05-29 VITALS
BODY MASS INDEX: 30.91 KG/M2 | HEIGHT: 62 IN | RESPIRATION RATE: 12 BRPM | HEART RATE: 67 BPM | WEIGHT: 168 LBS | SYSTOLIC BLOOD PRESSURE: 122 MMHG | DIASTOLIC BLOOD PRESSURE: 74 MMHG

## 2018-05-29 DIAGNOSIS — E78.2 MIXED HYPERLIPIDEMIA: ICD-10-CM

## 2018-05-29 DIAGNOSIS — I65.29 STENOSIS OF CAROTID ARTERY, UNSPECIFIED LATERALITY: ICD-10-CM

## 2018-05-29 DIAGNOSIS — R07.9 CHEST PAIN, UNSPECIFIED TYPE: Primary | ICD-10-CM

## 2018-05-29 PROCEDURE — G8598 ASA/ANTIPLAT THER USED: HCPCS | Performed by: INTERNAL MEDICINE

## 2018-05-29 PROCEDURE — 3017F COLORECTAL CA SCREEN DOC REV: CPT | Performed by: INTERNAL MEDICINE

## 2018-05-29 PROCEDURE — 99213 OFFICE O/P EST LOW 20 MIN: CPT | Performed by: INTERNAL MEDICINE

## 2018-05-29 PROCEDURE — G8417 CALC BMI ABV UP PARAM F/U: HCPCS | Performed by: INTERNAL MEDICINE

## 2018-05-29 PROCEDURE — G8427 DOCREV CUR MEDS BY ELIG CLIN: HCPCS | Performed by: INTERNAL MEDICINE

## 2018-05-29 PROCEDURE — 1036F TOBACCO NON-USER: CPT | Performed by: INTERNAL MEDICINE

## 2018-05-29 ASSESSMENT — ENCOUNTER SYMPTOMS
VOMITING: 0
APNEA: 0
SHORTNESS OF BREATH: 0
COUGH: 0
DIARRHEA: 0
CHEST TIGHTNESS: 0
BLOOD IN STOOL: 0
ABDOMINAL DISTENTION: 0
ABDOMINAL PAIN: 0
NAUSEA: 0
ANAL BLEEDING: 0
COLOR CHANGE: 0

## 2018-08-17 ENCOUNTER — OFFICE VISIT (OUTPATIENT)
Dept: PRIMARY CARE CLINIC | Age: 63
End: 2018-08-17
Payer: COMMERCIAL

## 2018-08-17 VITALS
RESPIRATION RATE: 16 BRPM | DIASTOLIC BLOOD PRESSURE: 72 MMHG | WEIGHT: 166 LBS | OXYGEN SATURATION: 98 % | BODY MASS INDEX: 30.55 KG/M2 | TEMPERATURE: 97.7 F | SYSTOLIC BLOOD PRESSURE: 112 MMHG | HEIGHT: 62 IN | HEART RATE: 67 BPM

## 2018-08-17 DIAGNOSIS — R10.31 RIGHT LOWER QUADRANT ABDOMINAL PAIN: ICD-10-CM

## 2018-08-17 DIAGNOSIS — E78.2 MIXED HYPERLIPIDEMIA: ICD-10-CM

## 2018-08-17 DIAGNOSIS — G43.909 MIGRAINE WITHOUT STATUS MIGRAINOSUS, NOT INTRACTABLE, UNSPECIFIED MIGRAINE TYPE: ICD-10-CM

## 2018-08-17 DIAGNOSIS — I10 HTN (HYPERTENSION), BENIGN: ICD-10-CM

## 2018-08-17 DIAGNOSIS — R10.31 RIGHT LOWER QUADRANT ABDOMINAL PAIN: Primary | ICD-10-CM

## 2018-08-17 DIAGNOSIS — F51.01 PRIMARY INSOMNIA: ICD-10-CM

## 2018-08-17 LAB
BILIRUBIN, POC: NORMAL
BLOOD URINE, POC: NORMAL
CLARITY, POC: CLEAR
COLOR, POC: YELLOW
GLUCOSE URINE, POC: NORMAL
KETONES, POC: NORMAL
LEUKOCYTE EST, POC: NORMAL
NITRITE, POC: NORMAL
PH, POC: 8
PROTEIN, POC: NORMAL
SPECIFIC GRAVITY, POC: 1.01
UROBILINOGEN, POC: NORMAL

## 2018-08-17 PROCEDURE — 1036F TOBACCO NON-USER: CPT | Performed by: FAMILY MEDICINE

## 2018-08-17 PROCEDURE — G8417 CALC BMI ABV UP PARAM F/U: HCPCS | Performed by: FAMILY MEDICINE

## 2018-08-17 PROCEDURE — G8598 ASA/ANTIPLAT THER USED: HCPCS | Performed by: FAMILY MEDICINE

## 2018-08-17 PROCEDURE — 3017F COLORECTAL CA SCREEN DOC REV: CPT | Performed by: FAMILY MEDICINE

## 2018-08-17 PROCEDURE — G8427 DOCREV CUR MEDS BY ELIG CLIN: HCPCS | Performed by: FAMILY MEDICINE

## 2018-08-17 PROCEDURE — 99214 OFFICE O/P EST MOD 30 MIN: CPT | Performed by: FAMILY MEDICINE

## 2018-08-17 PROCEDURE — 81002 URINALYSIS NONAUTO W/O SCOPE: CPT | Performed by: FAMILY MEDICINE

## 2018-08-17 RX ORDER — ELETRIPTAN HYDROBROMIDE 40 MG/1
40 TABLET, FILM COATED ORAL
Qty: 6 TABLET | Refills: 1 | Status: SHIPPED | OUTPATIENT
Start: 2018-08-17 | End: 2019-03-19

## 2018-08-17 RX ORDER — ZOLPIDEM TARTRATE 5 MG/1
5 TABLET ORAL NIGHTLY PRN
Qty: 90 TABLET | Refills: 1 | Status: SHIPPED | OUTPATIENT
Start: 2018-08-17 | End: 2019-02-13

## 2018-08-17 ASSESSMENT — ENCOUNTER SYMPTOMS
RESPIRATORY NEGATIVE: 1
ABDOMINAL PAIN: 1
PHOTOPHOBIA: 0
BLURRED VISION: 0
ORTHOPNEA: 0
WHEEZING: 0
EYES NEGATIVE: 1
DIARRHEA: 0
BACK PAIN: 0
SHORTNESS OF BREATH: 0
EYE REDNESS: 0
EYE ITCHING: 0
CONSTIPATION: 0

## 2018-08-17 NOTE — PROGRESS NOTES
 COLONOSCOPY  08/14/2017    DR Tellez    HEEL SPUR SURGERY  8/20/2014    HERNIA REPAIR  2007    HYSTERECTOMY      jayme    LEG AMPUTATION BELOW KNEE Left 02/23/2016    UPPER GASTROINTESTINAL ENDOSCOPY  11/15/2013    DR. MCMAHON     Family History   Problem Relation Age of Onset    Heart Disease Mother     Stroke Mother     Lupus Mother     Diabetes Mother     Heart Disease Father     Stroke Father     Diabetes Father     Cancer Sister         ovarian cancer    Sudden Death Brother         suicide     Social History     Social History    Marital status:      Spouse name: N/A    Number of children: N/A    Years of education: N/A     Occupational History    Not on file. Social History Main Topics    Smoking status: Never Smoker    Smokeless tobacco: Never Used    Alcohol use Yes    Drug use: No    Sexual activity: Not on file     Other Topics Concern    Not on file     Social History Narrative    No narrative on file     Allergies: Iodides and Iodine    Review of Systems   Constitutional: Negative. Negative for activity change, appetite change, fatigue and malaise/fatigue. HENT: Negative. Eyes: Negative. Negative for blurred vision, photophobia, redness and itching. Respiratory: Negative. Negative for shortness of breath and wheezing. Cardiovascular: Negative. Negative for chest pain, palpitations, orthopnea and PND. Gastrointestinal: Positive for abdominal pain. Negative for constipation and diarrhea. Genitourinary: Negative. Negative for hematuria, pelvic pain and urgency. Musculoskeletal: Negative. Negative for back pain and neck pain. Skin: Negative. Neurological: Positive for headaches. Psychiatric/Behavioral: Negative. Negative for agitation and behavioral problems. The patient is not nervous/anxious.         Objective:   /72 (Site: Left Arm, Position: Sitting, Cuff Size: Medium Adult)   Pulse 67   Temp 97.7 °F (36.5 °C)   Resp 16   Ht 5' 2\" (1.575 m)   Wt 166 lb (75.3 kg) Comment: with prostetic. LMP  (LMP Unknown)   SpO2 98%   BMI 30.36 kg/m²     Physical Exam   Constitutional: She is oriented to person, place, and time. She appears well-developed and well-nourished. HENT:   Head: Normocephalic and atraumatic. Eyes: Pupils are equal, round, and reactive to light. Conjunctivae and EOM are normal.   Neck: Normal range of motion. Neck supple. No thyromegaly present. Cardiovascular: Normal rate, regular rhythm and normal heart sounds. No murmur heard. Pulmonary/Chest: Effort normal and breath sounds normal. She has no wheezes. She exhibits no tenderness. Abdominal: Soft. Bowel sounds are normal. There is tenderness. Musculoskeletal: Normal range of motion. She exhibits no edema or tenderness. Lymphadenopathy:     She has no cervical adenopathy. Neurological: She is alert and oriented to person, place, and time. She has normal reflexes. Coordination normal.   Skin: Skin is warm and dry. No rash noted. Psychiatric: She has a normal mood and affect. Thought content normal.   Nursing note and vitals reviewed. Assessment:      Diagnosis Orders   1. Right lower quadrant abdominal pain  Steph Ramos MD    POCT Urinalysis no Micro    Urine Culture   2. HTN (hypertension), benign     3. Primary insomnia  zolpidem (AMBIEN) 5 MG tablet   4. Migraine without status migrainosus, not intractable, unspecified migraine type  eletriptan (RELPAX) 40 MG tablet   5. Mixed hyperlipidemia         Plan:      Orders Placed This Encounter   Procedures    Urine Culture     Standing Status:   Future     Number of Occurrences:   1     Standing Expiration Date:   8/17/2019     Order Specific Question:   Specify (ex-cath, midstream, cysto, etc)?      Answer:   Arsenio Denver, MD     Referral Priority:   Routine     Referral Type:   Eval and Treat     Referral Reason:   Specialty Services Required     Referred to Provider:   Josh Basilio MD     Requested Specialty:   Obstetrics & Gynecology     Number of Visits Requested:   1    POCT Urinalysis no Micro     Orders Placed This Encounter   Medications    eletriptan (RELPAX) 40 MG tablet     Sig: Take 1 tablet by mouth once as needed (migraine) may repeat in 2 hours if necessary     Dispense:  6 tablet     Refill:  1    zolpidem (AMBIEN) 5 MG tablet     Sig: Take 1 tablet by mouth nightly as needed for Sleep for up to 180 days. .     Dispense:  90 tablet     Refill:  1       Controlled Substances Monitoring:      Return in about 4 weeks (around 9/14/2018) for Review of [address] [place] [address]. Francy Cogan, RMA, CMA  , am scribing for and in the presence of Daphney Clifton DO. Electronically signed by :  MAURILIO Williamson, [facility], Daphney Clifton DO, personally performed the services described in this documentation, as scribed by Zaida Duckworth in my presence, and it is both accurate and complete.  Electronically signed by: Daphney Clifton DO    8/22/18 10:53 PM    Daphney Clifton DO

## 2018-08-19 LAB — URINE CULTURE, ROUTINE: NORMAL

## 2018-08-31 ENCOUNTER — OFFICE VISIT (OUTPATIENT)
Dept: OBGYN CLINIC | Age: 63
End: 2018-08-31
Payer: COMMERCIAL

## 2018-08-31 VITALS — SYSTOLIC BLOOD PRESSURE: 118 MMHG | BODY MASS INDEX: 29.81 KG/M2 | WEIGHT: 163 LBS | DIASTOLIC BLOOD PRESSURE: 72 MMHG

## 2018-08-31 DIAGNOSIS — R10.2 PELVIC PAIN IN FEMALE: Primary | ICD-10-CM

## 2018-08-31 PROCEDURE — 3017F COLORECTAL CA SCREEN DOC REV: CPT | Performed by: OBSTETRICS & GYNECOLOGY

## 2018-08-31 PROCEDURE — 99203 OFFICE O/P NEW LOW 30 MIN: CPT | Performed by: OBSTETRICS & GYNECOLOGY

## 2018-08-31 PROCEDURE — G8427 DOCREV CUR MEDS BY ELIG CLIN: HCPCS | Performed by: OBSTETRICS & GYNECOLOGY

## 2018-08-31 PROCEDURE — 1036F TOBACCO NON-USER: CPT | Performed by: OBSTETRICS & GYNECOLOGY

## 2018-08-31 PROCEDURE — G8598 ASA/ANTIPLAT THER USED: HCPCS | Performed by: OBSTETRICS & GYNECOLOGY

## 2018-08-31 PROCEDURE — G8417 CALC BMI ABV UP PARAM F/U: HCPCS | Performed by: OBSTETRICS & GYNECOLOGY

## 2018-08-31 NOTE — PROGRESS NOTES
SUBJECTIVE:   59 y.o. [id number] female complains of pelvic pain. PT with h/o HÉCTOR/BSO with bladder lift in the past. PT states for the past several months she has noticed and increasing RLQ pain. PT states her sx are worse in the AM when she has a full bladder and sx resolves after she urinates. PT states she has good urinary continence. PT denies any VB. Review of Systems:  General ROS: negative  Psychological ROS: negative  ENT ROS: negative  Endocrine ROS: negative  Respiratory ROS: no cough, shortness of breath, or wheezing  Cardiovascular ROS: no chest pain or dyspnea on exertion  Gastrointestinal ROS: no abdominal pain, change in bowel habits, or black or bloody stools  Genito-Urinary ROS: no dysuria, trouble voiding, or hematuria  Musculoskeletal ROS: negative  Neurological ROS: no TIA or stroke symptoms  Dermatological ROS: negative    OBJECTIVE:   /72   Wt 163 lb (73.9 kg)   LMP  (LMP Unknown)   BMI 29.81 kg/m²      Physical Exam:  GEN: She appears well, afebrile. HEENT: normal cephalic, atraumatic  CVS: regular rate and rhythm  ABDOMEN: benign, soft, nontender, no masses. MUSCULOSKELETAL: normal gait, no masses  SKIN: normal texture and tone, no lesions  NEURO: normal tone, no hyperreflexia, 1+DTRs throughout  PT with LLE prosthesis     Pelvic Exam:   EFG: normal external genitalia  URETHRA: normal appearing without diverticula or lesions  VULVA: normal appearing vulva with no masses, tenderness or lesions  VAGINA: normal rugae, no masses or lesions, well healed cuff  ADNEXA: normal adnexa in size, nontender and no masses. PERINEUM: normal appearing without lesions or masses  ANUS: normal appearing without lesions or masses, no fissures or hemorrhoids    ASSESSMENT:   Pelvic Pain    PLAN:   US Pending  Past medical, social and family history reviewed and updated in pt's chart.    Pt to return in 2wks to discuss results and treatment options

## 2018-09-05 ENCOUNTER — HOSPITAL ENCOUNTER (OUTPATIENT)
Dept: ULTRASOUND IMAGING | Age: 63
Discharge: HOME | End: 2018-09-07
Payer: COMMERCIAL

## 2018-09-05 DIAGNOSIS — R10.2 PELVIC PAIN IN FEMALE: ICD-10-CM

## 2018-09-05 PROCEDURE — 76856 US EXAM PELVIC COMPLETE: CPT

## 2018-09-06 ENCOUNTER — TELEPHONE (OUTPATIENT)
Dept: OBGYN CLINIC | Age: 63
End: 2018-09-06

## 2018-09-24 DIAGNOSIS — E03.9 HYPOTHYROIDISM (ACQUIRED): ICD-10-CM

## 2018-09-24 DIAGNOSIS — E78.2 MIXED HYPERLIPIDEMIA: ICD-10-CM

## 2018-09-24 DIAGNOSIS — I10 HTN (HYPERTENSION), BENIGN: ICD-10-CM

## 2018-09-24 DIAGNOSIS — F32.0 MILD SINGLE CURRENT EPISODE OF MAJOR DEPRESSIVE DISORDER (HCC): ICD-10-CM

## 2018-09-24 RX ORDER — TRIAMTERENE AND HYDROCHLOROTHIAZIDE 75; 50 MG/1; MG/1
TABLET ORAL
Qty: 90 TABLET | Refills: 1 | Status: SHIPPED | OUTPATIENT
Start: 2018-09-24 | End: 2019-03-19 | Stop reason: SDUPTHER

## 2018-09-24 RX ORDER — LEVOTHYROXINE AND LIOTHYRONINE 19; 4.5 UG/1; UG/1
TABLET ORAL
Qty: 90 TABLET | Refills: 1 | Status: SHIPPED | OUTPATIENT
Start: 2018-09-24 | End: 2019-03-19 | Stop reason: SDUPTHER

## 2018-09-24 RX ORDER — SIMVASTATIN 10 MG
TABLET ORAL
Qty: 90 TABLET | Refills: 1 | Status: SHIPPED | OUTPATIENT
Start: 2018-09-24 | End: 2019-03-19 | Stop reason: SDUPTHER

## 2018-09-24 RX ORDER — METOPROLOL SUCCINATE 25 MG/1
TABLET, EXTENDED RELEASE ORAL
Qty: 90 TABLET | Refills: 1 | Status: SHIPPED | OUTPATIENT
Start: 2018-09-24 | End: 2019-03-19 | Stop reason: SDUPTHER

## 2018-09-24 NOTE — TELEPHONE ENCOUNTER
From: Shahrzad Marques  Sent: 9/24/2018 11:25 AM EDT  Subject: Medication Renewal Request    [facility]  Jag would like a refill of the following medications:     metoprolol succinate (TOPROL XL) 25 MG extended release tablet Vivi Marroquin, ]     sertraline (ZOLOFT) 50 MG tablet Vivi Boos, DO]     thyroid (ARMOUR THYROID) 30 MG tablet Vivi Boos, DO]     simvastatin (ZOCOR) 10 MG tablet Vivi Marroquin, DO]     triamterene-hydrochlorothiazide (MAXZIDE) 75-50 MG per tablet Vivi Marroquin, DO]    Preferred pharmacy: [facility] [address] [place] [address]

## 2018-11-16 ENCOUNTER — OFFICE VISIT (OUTPATIENT)
Dept: PRIMARY CARE CLINIC | Age: 63
End: 2018-11-16
Payer: COMMERCIAL

## 2018-11-16 VITALS
OXYGEN SATURATION: 96 % | HEIGHT: 62 IN | WEIGHT: 162 LBS | DIASTOLIC BLOOD PRESSURE: 80 MMHG | BODY MASS INDEX: 29.81 KG/M2 | HEART RATE: 62 BPM | TEMPERATURE: 98 F | RESPIRATION RATE: 16 BRPM | SYSTOLIC BLOOD PRESSURE: 118 MMHG

## 2018-11-16 DIAGNOSIS — E78.2 MIXED HYPERLIPIDEMIA: ICD-10-CM

## 2018-11-16 DIAGNOSIS — Z23 NEED FOR INFLUENZA VACCINATION: ICD-10-CM

## 2018-11-16 DIAGNOSIS — R10.31 RLQ ABDOMINAL PAIN: Primary | ICD-10-CM

## 2018-11-16 DIAGNOSIS — I10 HTN (HYPERTENSION), BENIGN: ICD-10-CM

## 2018-11-16 LAB
BILIRUBIN, POC: NORMAL
BLOOD URINE, POC: NORMAL
CLARITY, POC: CLEAR
COLOR, POC: YELLOW
GLUCOSE URINE, POC: NORMAL
KETONES, POC: NORMAL
LEUKOCYTE EST, POC: NORMAL
NITRITE, POC: NORMAL
PH, POC: 6
PROTEIN, POC: NORMAL
SPECIFIC GRAVITY, POC: 1.02
UROBILINOGEN, POC: NORMAL

## 2018-11-16 PROCEDURE — 1036F TOBACCO NON-USER: CPT | Performed by: FAMILY MEDICINE

## 2018-11-16 PROCEDURE — 3017F COLORECTAL CA SCREEN DOC REV: CPT | Performed by: FAMILY MEDICINE

## 2018-11-16 PROCEDURE — 90688 IIV4 VACCINE SPLT 0.5 ML IM: CPT | Performed by: FAMILY MEDICINE

## 2018-11-16 PROCEDURE — G8417 CALC BMI ABV UP PARAM F/U: HCPCS | Performed by: FAMILY MEDICINE

## 2018-11-16 PROCEDURE — 81002 URINALYSIS NONAUTO W/O SCOPE: CPT | Performed by: FAMILY MEDICINE

## 2018-11-16 PROCEDURE — 99213 OFFICE O/P EST LOW 20 MIN: CPT | Performed by: FAMILY MEDICINE

## 2018-11-16 PROCEDURE — G8482 FLU IMMUNIZE ORDER/ADMIN: HCPCS | Performed by: FAMILY MEDICINE

## 2018-11-16 PROCEDURE — G8598 ASA/ANTIPLAT THER USED: HCPCS | Performed by: FAMILY MEDICINE

## 2018-11-16 PROCEDURE — 90471 IMMUNIZATION ADMIN: CPT | Performed by: FAMILY MEDICINE

## 2018-11-16 PROCEDURE — G8427 DOCREV CUR MEDS BY ELIG CLIN: HCPCS | Performed by: FAMILY MEDICINE

## 2018-11-16 ASSESSMENT — ENCOUNTER SYMPTOMS
NAUSEA: 0
HEMATOCHEZIA: 0
WHEEZING: 0
BACK PAIN: 0
VOMITING: 0
ABDOMINAL PAIN: 1
RESPIRATORY NEGATIVE: 1
CONSTIPATION: 0
EYE REDNESS: 0
FLATUS: 0
EYES NEGATIVE: 1
PHOTOPHOBIA: 0
DIARRHEA: 0
EYE ITCHING: 0
BELCHING: 0
SHORTNESS OF BREATH: 0

## 2018-11-16 ASSESSMENT — CROHNS DISEASE ACTIVITY INDEX (CDAI): CDAI SCORE: 0

## 2018-11-16 NOTE — LETTER
[facility]  [address] [address]  Phone: [phone]  Fax: [phone] [facility], DO        November 16, 2018     Patient: Tiarra Alfonso   YOB: 1955   Date of Visit: 11/16/2018       To Whom It May Concern: It is my medical opinion that Mishel Bond requires a disability parking placard for the following reasons:  She cannot walk 200 feet without stopping to rest.  Duration of need: 3 years    If you have any questions or concerns, please don't hesitate to call.     Sincerely,        Leo Romero, DO

## 2018-11-27 ENCOUNTER — OFFICE VISIT (OUTPATIENT)
Dept: CARDIOLOGY CLINIC | Age: 63
End: 2018-11-27
Payer: COMMERCIAL

## 2018-11-27 VITALS
DIASTOLIC BLOOD PRESSURE: 78 MMHG | OXYGEN SATURATION: 96 % | RESPIRATION RATE: 20 BRPM | HEART RATE: 81 BPM | WEIGHT: 169.2 LBS | BODY MASS INDEX: 31.14 KG/M2 | HEIGHT: 62 IN | SYSTOLIC BLOOD PRESSURE: 122 MMHG

## 2018-11-27 DIAGNOSIS — E78.2 MIXED HYPERLIPIDEMIA: ICD-10-CM

## 2018-11-27 DIAGNOSIS — I10 HTN (HYPERTENSION), BENIGN: ICD-10-CM

## 2018-11-27 DIAGNOSIS — I65.29 STENOSIS OF CAROTID ARTERY, UNSPECIFIED LATERALITY: ICD-10-CM

## 2018-11-27 DIAGNOSIS — R07.9 CHEST PAIN, UNSPECIFIED TYPE: Primary | ICD-10-CM

## 2018-11-27 PROCEDURE — G8427 DOCREV CUR MEDS BY ELIG CLIN: HCPCS | Performed by: INTERNAL MEDICINE

## 2018-11-27 PROCEDURE — 3017F COLORECTAL CA SCREEN DOC REV: CPT | Performed by: INTERNAL MEDICINE

## 2018-11-27 PROCEDURE — G8482 FLU IMMUNIZE ORDER/ADMIN: HCPCS | Performed by: INTERNAL MEDICINE

## 2018-11-27 PROCEDURE — 99213 OFFICE O/P EST LOW 20 MIN: CPT | Performed by: INTERNAL MEDICINE

## 2018-11-27 PROCEDURE — 1036F TOBACCO NON-USER: CPT | Performed by: INTERNAL MEDICINE

## 2018-11-27 PROCEDURE — G8417 CALC BMI ABV UP PARAM F/U: HCPCS | Performed by: INTERNAL MEDICINE

## 2018-11-27 PROCEDURE — G8598 ASA/ANTIPLAT THER USED: HCPCS | Performed by: INTERNAL MEDICINE

## 2018-11-27 ASSESSMENT — ENCOUNTER SYMPTOMS
BLOOD IN STOOL: 0
APNEA: 0
VOMITING: 0
VOICE CHANGE: 0
NAUSEA: 0
CHEST TIGHTNESS: 0
TROUBLE SWALLOWING: 0
DIARRHEA: 0
SHORTNESS OF BREATH: 0
COLOR CHANGE: 0
ANAL BLEEDING: 0
FACIAL SWELLING: 0
WHEEZING: 0
ABDOMINAL DISTENTION: 0

## 2018-11-27 NOTE — PROGRESS NOTES
[facility] CARDIOLOGY OFFICE FOLLOW-UP      Patient: Luís Richards  YOB: 1955  MRN: [id number]    Chief Complaint:  Chief Complaint   Patient presents with    6 Month Follow-Up    Hypertension    Carotid Disease     Carotid Stenosis         Subjective/HPI:  18: Patient presents today for follow-up of hypertension. Chest pain is resolved. Does not smoke. No dizziness CHF or syncope. Has hypothyroidism and bipolar triglyceridemia. See me in 6 months.     18: Patient presents today for evaluation of chest pain. She saw me about 3 years ago. Her  is a patient of mine. And underwent bypass surgery recently. She had one episode of chest pain when that they were having this moment with him needing bypass surgery. She will all stressed out. Chest pain was left-sided. No nausea no vomiting. It has not recurred. Does not smoke. She has hypothyroidism hypertension hyperlipidemia that is stable. She'll see me in 6 months if the chest pain recurs, then consider stress test.     7/8/15: Patient is followed on a regular basis by Dr. Krysta Rosairo DO. BP much better. Palpitations better with toprol. Non smoker. See in [facility]           Past Medical History:   Diagnosis Date    Bronchitis 2014    Carotid stenosis     Eczema 2014    Endometriosis     Headache(784.0)     Hyperlipidemia     Hypertension     Hypothyroidism     Menetrier disease     Osteoarthritis        Past Surgical History:   Procedure Laterality Date     SECTION      CHOLECYSTECTOMY      COLONOSCOPY  14    DR. Tellez    COLONOSCOPY  2017    DR Tellez    HEEL SPUR SURGERY  2014    HERNIA REPAIR  2007    HYSTERECTOMY      jayme    LEG AMPUTATION BELOW KNEE Left 2016    UPPER GASTROINTESTINAL ENDOSCOPY  11/15/2013    DR. MCMAHON       Family History   Problem Relation Age of Onset    Heart Disease Mother     Stroke Mother     Lupus Mother     Diabetes Mother     Heart Disease

## 2018-11-30 ENCOUNTER — OFFICE VISIT (OUTPATIENT)
Dept: SURGERY | Age: 63
End: 2018-11-30
Payer: COMMERCIAL

## 2018-11-30 VITALS
TEMPERATURE: 96.8 F | SYSTOLIC BLOOD PRESSURE: 118 MMHG | DIASTOLIC BLOOD PRESSURE: 76 MMHG | WEIGHT: 176 LBS | HEIGHT: 62 IN | BODY MASS INDEX: 32.39 KG/M2

## 2018-11-30 DIAGNOSIS — R10.31 RIGHT LOWER QUADRANT ABDOMINAL PAIN: Primary | ICD-10-CM

## 2018-11-30 PROCEDURE — G8417 CALC BMI ABV UP PARAM F/U: HCPCS | Performed by: COLON & RECTAL SURGERY

## 2018-11-30 PROCEDURE — G8482 FLU IMMUNIZE ORDER/ADMIN: HCPCS | Performed by: COLON & RECTAL SURGERY

## 2018-11-30 PROCEDURE — 99204 OFFICE O/P NEW MOD 45 MIN: CPT | Performed by: COLON & RECTAL SURGERY

## 2018-11-30 PROCEDURE — 3017F COLORECTAL CA SCREEN DOC REV: CPT | Performed by: COLON & RECTAL SURGERY

## 2018-11-30 PROCEDURE — G8427 DOCREV CUR MEDS BY ELIG CLIN: HCPCS | Performed by: COLON & RECTAL SURGERY

## 2018-11-30 PROCEDURE — G8598 ASA/ANTIPLAT THER USED: HCPCS | Performed by: COLON & RECTAL SURGERY

## 2018-11-30 PROCEDURE — 1036F TOBACCO NON-USER: CPT | Performed by: COLON & RECTAL SURGERY

## 2018-11-30 ASSESSMENT — ENCOUNTER SYMPTOMS
VOMITING: 0
RECTAL PAIN: 0
ABDOMINAL PAIN: 1
ABDOMINAL DISTENTION: 0
NAUSEA: 0
ANAL BLEEDING: 0
COLOR CHANGE: 0
CONSTIPATION: 0
BLOOD IN STOOL: 0

## 2018-12-13 DIAGNOSIS — R10.9 ABDOMINAL PAIN, UNSPECIFIED ABDOMINAL LOCATION: Primary | ICD-10-CM

## 2018-12-19 ENCOUNTER — HOSPITAL ENCOUNTER (OUTPATIENT)
Dept: LAB | Age: 63
Discharge: HOME | End: 2018-12-19
Payer: COMMERCIAL

## 2018-12-19 ENCOUNTER — HOSPITAL ENCOUNTER (OUTPATIENT)
Dept: CT IMAGING | Age: 63
Discharge: HOME | End: 2018-12-21
Payer: COMMERCIAL

## 2018-12-19 DIAGNOSIS — R10.9 ABDOMINAL PAIN, UNSPECIFIED ABDOMINAL LOCATION: ICD-10-CM

## 2018-12-19 DIAGNOSIS — R10.31 RIGHT LOWER QUADRANT ABDOMINAL PAIN: ICD-10-CM

## 2018-12-19 LAB
ANION GAP SERPL CALCULATED.3IONS-SCNC: 12 MEQ/L (ref 7–13)
BUN BLDV-MCNC: 22 MG/DL (ref 8–23)
CALCIUM SERPL-MCNC: 9.6 MG/DL (ref 8.6–10.2)
CHLORIDE BLD-SCNC: 101 MEQ/L (ref 98–107)
CO2: 30 MEQ/L (ref 22–29)
CREAT SERPL-MCNC: 1.12 MG/DL (ref 0.5–0.9)
GFR AFRICAN AMERICAN: 59.5
GFR NON-AFRICAN AMERICAN: 49.2
GLUCOSE BLD-MCNC: 122 MG/DL (ref 74–109)
POTASSIUM SERPL-SCNC: 3.6 MEQ/L (ref 3.5–5.1)
SODIUM BLD-SCNC: 143 MEQ/L (ref 132–144)

## 2018-12-19 PROCEDURE — 80048 BASIC METABOLIC PNL TOTAL CA: CPT

## 2018-12-19 PROCEDURE — 36415 COLL VENOUS BLD VENIPUNCTURE: CPT

## 2018-12-19 PROCEDURE — 2500000003 HC RX 250 WO HCPCS: Performed by: COLON & RECTAL SURGERY

## 2018-12-19 PROCEDURE — 74176 CT ABD & PELVIS W/O CONTRAST: CPT

## 2018-12-19 RX ADMIN — BARIUM SULFATE 450 ML: 20 SUSPENSION ORAL at 07:26

## 2018-12-28 ENCOUNTER — TELEPHONE (OUTPATIENT)
Dept: SURGERY | Age: 63
End: 2018-12-28

## 2018-12-31 ENCOUNTER — TELEPHONE (OUTPATIENT)
Dept: SURGERY | Age: 63
End: 2018-12-31

## 2019-02-12 ENCOUNTER — TELEPHONE (OUTPATIENT)
Dept: FAMILY MEDICINE CLINIC | Age: 64
End: 2019-02-12

## 2019-02-28 DIAGNOSIS — F51.01 PRIMARY INSOMNIA: ICD-10-CM

## 2019-03-01 RX ORDER — ZOLPIDEM TARTRATE 5 MG/1
5 TABLET ORAL NIGHTLY PRN
Qty: 90 TABLET | Refills: 1 | OUTPATIENT
Start: 2019-03-01 | End: 2019-08-28

## 2019-03-19 ENCOUNTER — OFFICE VISIT (OUTPATIENT)
Dept: PRIMARY CARE CLINIC | Age: 64
End: 2019-03-19
Payer: COMMERCIAL

## 2019-03-19 VITALS
DIASTOLIC BLOOD PRESSURE: 78 MMHG | RESPIRATION RATE: 16 BRPM | BODY MASS INDEX: 31.65 KG/M2 | SYSTOLIC BLOOD PRESSURE: 124 MMHG | HEIGHT: 62 IN | OXYGEN SATURATION: 95 % | HEART RATE: 73 BPM | WEIGHT: 172 LBS | TEMPERATURE: 98.6 F

## 2019-03-19 DIAGNOSIS — I10 HTN (HYPERTENSION), BENIGN: ICD-10-CM

## 2019-03-19 DIAGNOSIS — F32.0 MILD SINGLE CURRENT EPISODE OF MAJOR DEPRESSIVE DISORDER (HCC): ICD-10-CM

## 2019-03-19 DIAGNOSIS — E55.9 VITAMIN D DEFICIENCY: ICD-10-CM

## 2019-03-19 DIAGNOSIS — G58.9 MONONEUROPATHY: ICD-10-CM

## 2019-03-19 DIAGNOSIS — E78.2 MIXED HYPERLIPIDEMIA: ICD-10-CM

## 2019-03-19 DIAGNOSIS — E03.9 HYPOTHYROIDISM (ACQUIRED): ICD-10-CM

## 2019-03-19 DIAGNOSIS — F51.01 PRIMARY INSOMNIA: ICD-10-CM

## 2019-03-19 PROCEDURE — G8427 DOCREV CUR MEDS BY ELIG CLIN: HCPCS | Performed by: FAMILY MEDICINE

## 2019-03-19 PROCEDURE — 1036F TOBACCO NON-USER: CPT | Performed by: FAMILY MEDICINE

## 2019-03-19 PROCEDURE — G8482 FLU IMMUNIZE ORDER/ADMIN: HCPCS | Performed by: FAMILY MEDICINE

## 2019-03-19 PROCEDURE — G8598 ASA/ANTIPLAT THER USED: HCPCS | Performed by: FAMILY MEDICINE

## 2019-03-19 PROCEDURE — G8417 CALC BMI ABV UP PARAM F/U: HCPCS | Performed by: FAMILY MEDICINE

## 2019-03-19 PROCEDURE — 3017F COLORECTAL CA SCREEN DOC REV: CPT | Performed by: FAMILY MEDICINE

## 2019-03-19 PROCEDURE — 99213 OFFICE O/P EST LOW 20 MIN: CPT | Performed by: FAMILY MEDICINE

## 2019-03-19 RX ORDER — METOPROLOL SUCCINATE 25 MG/1
TABLET, EXTENDED RELEASE ORAL
Qty: 90 TABLET | Refills: 1 | Status: SHIPPED | OUTPATIENT
Start: 2019-03-19

## 2019-03-19 RX ORDER — SIMVASTATIN 10 MG
TABLET ORAL
Qty: 90 TABLET | Refills: 1 | Status: SHIPPED | OUTPATIENT
Start: 2019-03-19

## 2019-03-19 RX ORDER — ERGOCALCIFEROL 1.25 MG/1
50000 CAPSULE ORAL WEEKLY
Qty: 12 CAPSULE | Refills: 0 | Status: SHIPPED | OUTPATIENT
Start: 2019-03-19

## 2019-03-19 RX ORDER — TRIAMTERENE AND HYDROCHLOROTHIAZIDE 75; 50 MG/1; MG/1
TABLET ORAL
Qty: 90 TABLET | Refills: 1 | Status: SHIPPED | OUTPATIENT
Start: 2019-03-19

## 2019-03-19 RX ORDER — GABAPENTIN 600 MG/1
600 TABLET ORAL 2 TIMES DAILY
Qty: 180 TABLET | Refills: 1 | Status: SHIPPED | OUTPATIENT
Start: 2019-03-19

## 2019-03-19 RX ORDER — ZOLPIDEM TARTRATE 5 MG/1
TABLET ORAL
Qty: 90 TABLET | Refills: 1 | Status: SHIPPED | OUTPATIENT
Start: 2019-03-19 | End: 2019-08-05 | Stop reason: SDUPTHER

## 2019-03-19 RX ORDER — LEVOTHYROXINE AND LIOTHYRONINE 19; 4.5 UG/1; UG/1
TABLET ORAL
Qty: 90 TABLET | Refills: 1 | Status: SHIPPED | OUTPATIENT
Start: 2019-03-19

## 2019-03-19 ASSESSMENT — PATIENT HEALTH QUESTIONNAIRE - PHQ9
SUM OF ALL RESPONSES TO PHQ QUESTIONS 1-9: 0
SUM OF ALL RESPONSES TO PHQ9 QUESTIONS 1 & 2: 0
2. FEELING DOWN, DEPRESSED OR HOPELESS: 0
1. LITTLE INTEREST OR PLEASURE IN DOING THINGS: 0
SUM OF ALL RESPONSES TO PHQ QUESTIONS 1-9: 0

## 2019-03-19 ASSESSMENT — ENCOUNTER SYMPTOMS
GASTROINTESTINAL NEGATIVE: 1
CONSTIPATION: 0
EYE ITCHING: 0
BACK PAIN: 0
DIARRHEA: 0
PHOTOPHOBIA: 0
RESPIRATORY NEGATIVE: 1
EYES NEGATIVE: 1
WHEEZING: 0
ABDOMINAL PAIN: 0
EYE REDNESS: 0
SHORTNESS OF BREATH: 0

## 2019-03-22 ENCOUNTER — HOSPITAL ENCOUNTER (OUTPATIENT)
Dept: LAB | Age: 64
Discharge: HOME | End: 2019-03-22
Payer: COMMERCIAL

## 2019-03-22 DIAGNOSIS — E78.2 MIXED HYPERLIPIDEMIA: ICD-10-CM

## 2019-03-22 DIAGNOSIS — E55.9 VITAMIN D DEFICIENCY: ICD-10-CM

## 2019-03-22 LAB
ALBUMIN SERPL-MCNC: 4 G/DL (ref 3.5–4.6)
ALP BLD-CCNC: 88 U/L (ref 40–130)
ALT SERPL-CCNC: 20 U/L (ref 0–33)
ANION GAP SERPL CALCULATED.3IONS-SCNC: 14 MEQ/L (ref 9–15)
AST SERPL-CCNC: 20 U/L (ref 0–35)
BILIRUB SERPL-MCNC: 0.3 MG/DL (ref 0.2–0.7)
BUN BLDV-MCNC: 27 MG/DL (ref 8–23)
CALCIUM SERPL-MCNC: 9 MG/DL (ref 8.5–9.9)
CHLORIDE BLD-SCNC: 101 MEQ/L (ref 95–107)
CHOLESTEROL, TOTAL: 187 MG/DL (ref 0–199)
CO2: 28 MEQ/L (ref 20–31)
CREAT SERPL-MCNC: 1.11 MG/DL (ref 0.5–0.9)
GFR AFRICAN AMERICAN: >60
GFR NON-AFRICAN AMERICAN: 49.6
GLOBULIN: 3.1 G/DL (ref 2.3–3.5)
GLUCOSE BLD-MCNC: 131 MG/DL (ref 70–99)
HDLC SERPL-MCNC: 47 MG/DL (ref 40–59)
LDL CHOLESTEROL CALCULATED: 100 MG/DL (ref 0–129)
POTASSIUM SERPL-SCNC: 3.9 MEQ/L (ref 3.4–4.9)
SODIUM BLD-SCNC: 143 MEQ/L (ref 135–144)
TOTAL PROTEIN: 7.1 G/DL (ref 6.3–8)
TRIGL SERPL-MCNC: 201 MG/DL (ref 0–150)
VITAMIN D 25-HYDROXY: 23.3 NG/ML (ref 30–100)

## 2019-03-22 PROCEDURE — 36415 COLL VENOUS BLD VENIPUNCTURE: CPT

## 2019-03-22 PROCEDURE — 80053 COMPREHEN METABOLIC PANEL: CPT

## 2019-03-22 PROCEDURE — 80061 LIPID PANEL: CPT

## 2019-03-22 PROCEDURE — 82306 VITAMIN D 25 HYDROXY: CPT

## 2019-04-18 ENCOUNTER — OFFICE VISIT (OUTPATIENT)
Dept: FAMILY MEDICINE CLINIC | Age: 64
End: 2019-04-18
Payer: COMMERCIAL

## 2019-04-18 ENCOUNTER — HOSPITAL ENCOUNTER (OUTPATIENT)
Dept: GENERAL RADIOLOGY | Age: 64
Discharge: HOME | End: 2019-04-20
Payer: COMMERCIAL

## 2019-04-18 ENCOUNTER — HOSPITAL ENCOUNTER (OUTPATIENT)
Age: 64
Setting detail: SPECIMEN
Discharge: HOME | End: 2019-04-18
Payer: COMMERCIAL

## 2019-04-18 ENCOUNTER — HOSPITAL ENCOUNTER (OUTPATIENT)
Age: 64
Discharge: HOME | End: 2019-04-20
Payer: COMMERCIAL

## 2019-04-18 VITALS
SYSTOLIC BLOOD PRESSURE: 100 MMHG | HEART RATE: 81 BPM | DIASTOLIC BLOOD PRESSURE: 60 MMHG | WEIGHT: 170.2 LBS | BODY MASS INDEX: 31.13 KG/M2 | OXYGEN SATURATION: 97 % | TEMPERATURE: 98.1 F

## 2019-04-18 DIAGNOSIS — N18.30 CKD (CHRONIC KIDNEY DISEASE) STAGE 3, GFR 30-59 ML/MIN (HCC): ICD-10-CM

## 2019-04-18 DIAGNOSIS — Z76.89 ESTABLISHING CARE WITH NEW DOCTOR, ENCOUNTER FOR: ICD-10-CM

## 2019-04-18 DIAGNOSIS — M25.551 RIGHT HIP PAIN: ICD-10-CM

## 2019-04-18 DIAGNOSIS — E03.9 HYPOTHYROIDISM, UNSPECIFIED TYPE: ICD-10-CM

## 2019-04-18 DIAGNOSIS — G43.009 MIGRAINE WITHOUT AURA AND WITHOUT STATUS MIGRAINOSUS, NOT INTRACTABLE: ICD-10-CM

## 2019-04-18 DIAGNOSIS — R73.09 ELEVATED GLUCOSE: ICD-10-CM

## 2019-04-18 DIAGNOSIS — R73.09 ELEVATED GLUCOSE: Primary | ICD-10-CM

## 2019-04-18 DIAGNOSIS — Z11.4 ENCOUNTER FOR SCREENING FOR HIV: ICD-10-CM

## 2019-04-18 LAB
ANION GAP SERPL CALCULATED.3IONS-SCNC: 21 MEQ/L (ref 9–15)
BUN BLDV-MCNC: 27 MG/DL (ref 8–23)
CALCIUM SERPL-MCNC: 9.7 MG/DL (ref 8.5–9.9)
CHLORIDE BLD-SCNC: 97 MEQ/L (ref 95–107)
CO2: 24 MEQ/L (ref 20–31)
CREAT SERPL-MCNC: 1.12 MG/DL (ref 0.5–0.9)
GFR AFRICAN AMERICAN: 59.4
GFR NON-AFRICAN AMERICAN: 49.1
GLUCOSE BLD-MCNC: 101 MG/DL (ref 70–99)
HBA1C MFR BLD: 6.3 % (ref 4.8–5.9)
POTASSIUM SERPL-SCNC: 3.7 MEQ/L (ref 3.4–4.9)
SODIUM BLD-SCNC: 142 MEQ/L (ref 135–144)
TSH REFLEX: 2.19 UIU/ML (ref 0.44–3.86)

## 2019-04-18 PROCEDURE — 87389 HIV-1 AG W/HIV-1&-2 AB AG IA: CPT

## 2019-04-18 PROCEDURE — G8427 DOCREV CUR MEDS BY ELIG CLIN: HCPCS | Performed by: FAMILY MEDICINE

## 2019-04-18 PROCEDURE — G8598 ASA/ANTIPLAT THER USED: HCPCS | Performed by: FAMILY MEDICINE

## 2019-04-18 PROCEDURE — G8417 CALC BMI ABV UP PARAM F/U: HCPCS | Performed by: FAMILY MEDICINE

## 2019-04-18 PROCEDURE — 84443 ASSAY THYROID STIM HORMONE: CPT

## 2019-04-18 PROCEDURE — 3017F COLORECTAL CA SCREEN DOC REV: CPT | Performed by: FAMILY MEDICINE

## 2019-04-18 PROCEDURE — 99214 OFFICE O/P EST MOD 30 MIN: CPT | Performed by: FAMILY MEDICINE

## 2019-04-18 PROCEDURE — 73502 X-RAY EXAM HIP UNI 2-3 VIEWS: CPT

## 2019-04-18 PROCEDURE — 1036F TOBACCO NON-USER: CPT | Performed by: FAMILY MEDICINE

## 2019-04-18 PROCEDURE — 80048 BASIC METABOLIC PNL TOTAL CA: CPT

## 2019-04-18 PROCEDURE — 83036 HEMOGLOBIN GLYCOSYLATED A1C: CPT

## 2019-04-18 RX ORDER — ELETRIPTAN HYDROBROMIDE 40 MG/1
40 TABLET, FILM COATED ORAL
COMMUNITY

## 2019-04-18 ASSESSMENT — ENCOUNTER SYMPTOMS
COUGH: 0
WHEEZING: 0
DIARRHEA: 0
SORE THROAT: 0
RHINORRHEA: 0
CONSTIPATION: 0
SHORTNESS OF BREATH: 0
ABDOMINAL PAIN: 0

## 2019-04-18 ASSESSMENT — PATIENT HEALTH QUESTIONNAIRE - PHQ9
2. FEELING DOWN, DEPRESSED OR HOPELESS: 0
SUM OF ALL RESPONSES TO PHQ9 QUESTIONS 1 & 2: 0
SUM OF ALL RESPONSES TO PHQ QUESTIONS 1-9: 0
SUM OF ALL RESPONSES TO PHQ QUESTIONS 1-9: 0
1. LITTLE INTEREST OR PLEASURE IN DOING THINGS: 0

## 2019-04-18 NOTE — PROGRESS NOTES
[address] PRIMARY CARE  [address] [address]  Dept: [phone]  Dept Fax: : [phone]   Chief Complaint  Chief Complaint   Patient presents with   Alonzo Dukes in [place] Results     Had recent labs done would like to discuss       HPI:  62 y. o.female who presents for establish care:  (changing PCP due to insurance)    Hx of L BKA due to chronic pain: still gets chronic pain  which is helped by the gabapentin. Had some anxiety regarding this and was started on zoloft. Has been developing R hip pain over the past few years. HTN: tolerating the maxzide and metoprolol. Migraines: tolerable and well controlled on the prn eletriptan    The 10-year ASCVD risk score (Tejas Kerr, et al., 2013) is: 3.9%    Values used to calculate the score:      Age: 62 years      Sex: Female      Is Non- : No      Diabetic: No      Tobacco smoker: No      Systolic Blood Pressure: 402 mmHg      Is BP treated: Yes      HDL Cholesterol: 47 mg/dL      Total Cholesterol: 187 mg/dL     Past Medical History:   Diagnosis Date    Bronchitis 2014    Carotid stenosis     Eczema 2014    Endometriosis     Headache(784.0)     Hyperlipidemia     Hypertension     Hypothyroidism     Menetrier disease     Osteoarthritis      Past Surgical History:   Procedure Laterality Date     SECTION      CHOLECYSTECTOMY      COLONOSCOPY  14    DR. Chayo Pearl    COLONOSCOPY  2017    DR Chayo Pearl    HEEL SPUR SURGERY  2014    HERNIA REPAIR  2007    HYSTERECTOMY      jayme    LEG AMPUTATION BELOW KNEE Left 2016    UPPER GASTROINTESTINAL ENDOSCOPY  11/15/2013    DR. MCMAHON     Social History     Socioeconomic History    Marital status:      Spouse name: Not on file    Number of children: Not on file    Years of education: Not on file    Highest education level: Not on file   Occupational History    Not on file   Social Needs    Financial resource strain: Not on file    Food insecurity:     Worry: Not on file     Inability: Not on file    Transportation needs:     Medical: Not on file     Non-medical: Not on file   Tobacco Use    Smoking status: Never Smoker    Smokeless tobacco: Never Used   Substance and Sexual Activity    Alcohol use: Yes    Drug use: No    Sexual activity: Yes   Lifestyle    Physical activity:     Days per week: Not on file     Minutes per session: Not on file    Stress: Not on file   Relationships    Social connections:     Talks on phone: Not on file     Gets together: Not on file     Attends Sikhism service: Not on file     Active member of club or organization: Not on file     Attends meetings of clubs or organizations: Not on file     Relationship status: Not on file    Intimate partner violence:     Fear of current or ex partner: Not on file     Emotionally abused: Not on file     Physically abused: Not on file     Forced sexual activity: Not on file   Other Topics Concern    Not on file   Social History Narrative    Not on file     Allergies   Allergen Reactions    Tetanus Toxoids     Iodides Other (See Comments)     Vomiting, rash.  Cannot have iodine on skin    Iodine Nausea Only and Rash     Current Outpatient Medications   Medication Sig Dispense Refill    eletriptan (RELPAX) 40 MG tablet Take 40 mg by mouth once as needed may repeat in 2 hours if necessary      zolpidem (AMBIEN) 5 MG tablet take 1 tablet by mouth every evening if needed for sleep 90 tablet 1    metoprolol succinate (TOPROL XL) 25 MG extended release tablet take 1 tablet by mouth once daily 90 tablet 1    sertraline (ZOLOFT) 50 MG tablet take 1 tablet by mouth once daily 90 tablet 1    thyroid (ARMOUR THYROID) 30 MG tablet take 1 tablet by mouth once daily 90 tablet 1    simvastatin (ZOCOR) 10 MG tablet take 1 tablet by mouth every evening 90 tablet 1    triamterene-hydrochlorothiazide (MAXZIDE) 75-50 MG per tablet Take one(1) tablet daily. 90 tablet 1    vitamin D (ERGOCALCIFEROL) 22609 units CAPS capsule Take 1 capsule by mouth once a week 12 capsule 0    gabapentin (NEURONTIN) 600 MG tablet Take 1 tablet by mouth 2 times daily for 90 days. 180 tablet 1    aspirin 81 MG tablet Take 81 mg by mouth. No current facility-administered medications for this visit. ROS:  Review of Systems   Constitutional: Negative for chills and fever. HENT: Negative for rhinorrhea and sore throat. Respiratory: Negative for cough, shortness of breath and wheezing. Gastrointestinal: Negative for abdominal pain, constipation and diarrhea. Endocrine: Negative for polydipsia and polyuria. Genitourinary: Negative for dysuria, frequency and urgency. Neurological: Negative for syncope, light-headedness, numbness and headaches. Psychiatric/Behavioral: Negative for sleep disturbance. The patient is not nervous/anxious. Vitals:    04/18/19 0808   BP: 100/60   Pulse: 81   Temp: 98.1 °F (36.7 °C)   TempSrc: Oral   SpO2: 97%   Weight: 170 lb 3.2 oz (77.2 kg)       Physical exam:  Physical Exam   Constitutional: She is oriented to person, place, and time. She appears well-developed and well-nourished. No distress. HENT:   Head: Normocephalic and atraumatic. Mouth/Throat: No oropharyngeal exudate. Eyes: EOM are normal.   Neck: Normal range of motion. Cardiovascular: Normal rate, regular rhythm and normal heart sounds. No murmur heard. Pulmonary/Chest: Effort normal and breath sounds normal. No respiratory distress. She has no wheezes. Musculoskeletal: She exhibits no edema. Neurological: She is alert and oriented to person, place, and time. Skin: Skin is warm and dry. Psychiatric: She has a normal mood and affect. Her behavior is normal.   Vitals reviewed.       Assessment/Plan:  62 y.o. female here mainly for

## 2019-04-19 DIAGNOSIS — M25.551 PAIN OF RIGHT HIP JOINT: Primary | ICD-10-CM

## 2019-04-21 LAB — HIV 1,2 COMBO ANTIGEN/ANTIBODY: NEGATIVE

## 2019-05-29 ENCOUNTER — OFFICE VISIT (OUTPATIENT)
Dept: CARDIOLOGY CLINIC | Age: 64
End: 2019-05-29
Payer: COMMERCIAL

## 2019-05-29 VITALS
HEIGHT: 62 IN | WEIGHT: 170 LBS | HEART RATE: 72 BPM | DIASTOLIC BLOOD PRESSURE: 80 MMHG | BODY MASS INDEX: 31.28 KG/M2 | SYSTOLIC BLOOD PRESSURE: 124 MMHG | RESPIRATION RATE: 20 BRPM | OXYGEN SATURATION: 98 %

## 2019-05-29 DIAGNOSIS — I20.8 ANGINA AT REST (HCC): Primary | ICD-10-CM

## 2019-05-29 DIAGNOSIS — I10 HTN (HYPERTENSION), BENIGN: ICD-10-CM

## 2019-05-29 DIAGNOSIS — I65.23 BILATERAL CAROTID ARTERY STENOSIS: ICD-10-CM

## 2019-05-29 PROCEDURE — 1036F TOBACCO NON-USER: CPT | Performed by: INTERNAL MEDICINE

## 2019-05-29 PROCEDURE — G8598 ASA/ANTIPLAT THER USED: HCPCS | Performed by: INTERNAL MEDICINE

## 2019-05-29 PROCEDURE — 3017F COLORECTAL CA SCREEN DOC REV: CPT | Performed by: INTERNAL MEDICINE

## 2019-05-29 PROCEDURE — G8427 DOCREV CUR MEDS BY ELIG CLIN: HCPCS | Performed by: INTERNAL MEDICINE

## 2019-05-29 PROCEDURE — 99214 OFFICE O/P EST MOD 30 MIN: CPT | Performed by: INTERNAL MEDICINE

## 2019-05-29 PROCEDURE — G8417 CALC BMI ABV UP PARAM F/U: HCPCS | Performed by: INTERNAL MEDICINE

## 2019-05-29 ASSESSMENT — ENCOUNTER SYMPTOMS
FACIAL SWELLING: 0
VOICE CHANGE: 0
COLOR CHANGE: 0
BLOOD IN STOOL: 0
NAUSEA: 0
CHEST TIGHTNESS: 0
WHEEZING: 0
ABDOMINAL DISTENTION: 0
TROUBLE SWALLOWING: 0
SHORTNESS OF BREATH: 0
ANAL BLEEDING: 0
APNEA: 0
DIARRHEA: 0
VOMITING: 0

## 2019-05-29 NOTE — PROGRESS NOTES
[facility] CARDIOLOGY OFFICE FOLLOW-UP      Patient: Jyoti Hoover  YOB: 1955  MRN: [id number]    Chief Complaint:  Chief Complaint   Patient presents with    6 Month Follow-Up     Carotid Disease    Hypertension         Subjective/HPI:  19: Patient presents today for follow-up of chest pain. Has below (amputation. Has a history of mild carotid disease. Does not smoke. Has hypertension and neuropathy. She also has hypothyroidism. I will repeat a Lexiscan, echo and a carotid ultrasound and then see me     18: Patient presents today for follow-up of hypertension. Chest pain is resolved. Does not smoke. No dizziness CHF or syncope. Has hypothyroidism and bipolar triglyceridemia. See me in 6 months.     18: Patient presents today for evaluation of chest pain. She saw me about 3 years ago. Her  is a patient of mine. And underwent bypass surgery recently. She had one episode of chest pain when that they were having this moment with him needing bypass surgery. She will all stressed out. Chest pain was left-sided. No nausea no vomiting. It has not recurred. Does not smoke. She has hypothyroidism hypertension hyperlipidemia that is stable. She'll see me in 6 months if the chest pain recurs, then consider stress test.     7/8/15: Patient is followed on a regular basis by Dr. Anthony Troncoso DO. BP much better. Palpitations better with toprol. Non smoker. See in [facility]         Past Medical History:   Diagnosis Date    Bronchitis 2014    Carotid stenosis     Eczema 2014    Endometriosis     Headache(784.0)     Hyperlipidemia     Hypertension     Hypothyroidism     Menetrier disease     Osteoarthritis        Past Surgical History:   Procedure Laterality Date     SECTION      CHOLECYSTECTOMY      COLONOSCOPY  14    DR. Tellez    COLONOSCOPY  2017    DR Tellez    HEEL SPUR SURGERY  2014    HERNIA REPAIR  2007    HYSTERECTOMY      jayme    LEG AMPUTATION BELOW KNEE Left 02/23/2016    UPPER GASTROINTESTINAL ENDOSCOPY  11/15/2013    DR. MCMAHON       Family History   Problem Relation Age of Onset    Heart Disease Mother     Stroke Mother     Lupus Mother     Diabetes Mother     Heart Disease Father     Stroke Father     Diabetes Father     Cancer Sister         ovarian cancer    Sudden Death Brother         suicide       Social History     Socioeconomic History    Marital status:      Spouse name: None    Number of children: None    Years of education: None    Highest education level: None   Occupational History    None   Social Needs    Financial resource strain: None    Food insecurity:     Worry: None     Inability: None    Transportation needs:     Medical: None     Non-medical: None   Tobacco Use    Smoking status: Never Smoker    Smokeless tobacco: Never Used   Substance and Sexual Activity    Alcohol use: Yes    Drug use: No    Sexual activity: Yes   Lifestyle    Physical activity:     Days per week: None     Minutes per session: None    Stress: None   Relationships    Social connections:     Talks on phone: None     Gets together: None     Attends [employer] service: None     Active member of club or organization: None     Attends meetings of clubs or organizations: None     Relationship status: None    Intimate partner violence:     Fear of current or ex partner: None     Emotionally abused: None     Physically abused: None     Forced sexual activity: None   Other Topics Concern    None   Social History Narrative    None       Allergies   Allergen Reactions    Tetanus Toxoids     Iodides Other (See Comments)     Vomiting, rash.  Cannot have iodine on skin    Iodine Nausea Only and Rash       Current Outpatient Medications   Medication Sig Dispense Refill    eletriptan (RELPAX) 40 MG tablet Take 40 mg by mouth once as needed may repeat in 2 hours if necessary      zolpidem (AMBIEN) 5 MG tablet take 1 tablet by are negative. Review of System is negative except for as mentioned above. Physical Examination:    /80 (Site: Left Upper Arm, Position: Sitting, Cuff Size: Medium Adult)   Pulse 72   Resp 20   Ht 5' 2\" (1.575 m)   Wt 170 lb (77.1 kg)   LMP  (LMP Unknown)   SpO2 98%   BMI 31.09 kg/m²    Physical Exam   Constitutional: She appears healthy. No distress. HENT:   Nose: Nose normal.   Mouth/Throat: Dentition is normal. Oropharynx is clear. Eyes: Pupils are equal, round, and reactive to light. Conjunctivae are normal.   Neck: Normal range of motion and thyroid normal. Neck supple. Cardiovascular: Regular rhythm, S1 normal, S2 normal, normal heart sounds, intact distal pulses and normal pulses. PMI is not displaced. No murmur heard. Pulmonary/Chest: She has no wheezes. She has no rales. She exhibits no tenderness. Abdominal: Soft. Bowel sounds are normal. She exhibits no distension and no mass. There is no splenomegaly or hepatomegaly. There is no tenderness. No hernia. Neurological: She is alert and oriented to person, place, and time. She has normal motor skills. Gait normal.   Skin: Skin is warm and dry. No cyanosis. No jaundice. Nails show no clubbing.        LABS:  CBC:   Lab Results   Component Value Date    WBC 7.3 04/09/2018    RBC 4.06 04/09/2018    HGB 12.5 04/09/2018    HCT 37.6 04/09/2018    MCV 92.8 04/09/2018    MCH 30.8 04/09/2018    MCHC 33.2 04/09/2018    RDW 14.3 04/09/2018     04/09/2018    MPV 10.4 06/18/2014     Lipids:  Lab Results   Component Value Date    CHOL 187 03/22/2019    CHOL 175 04/09/2018    CHOL 215 (H) 04/18/2017     Lab Results   Component Value Date    TRIG 201 (H) 03/22/2019    TRIG 221 (H) 04/09/2018    TRIG 201 (H) 04/18/2017     Lab Results   Component Value Date    HDL 47 03/22/2019    HDL 47 04/09/2018    HDL 72 (H) 04/18/2017     Lab Results   Component Value Date    LDLCALC 100 03/22/2019    LDLCALC 84 04/09/2018    LDLCALC 103 04/18/2017 No results found for: LABVLDL, VLDL  No results found for: CHOLHDLRATIO  CMP:    Lab Results   Component Value Date     04/18/2019    K 3.7 04/18/2019    CL 97 04/18/2019    CO2 24 04/18/2019    BUN 27 04/18/2019    CREATININE 1.12 04/18/2019    GFRAA 59.4 04/18/2019    LABGLOM 49.1 04/18/2019    GLUCOSE 101 04/18/2019    PROT 7.1 03/22/2019    LABALBU 4.0 03/22/2019    CALCIUM 9.7 04/18/2019    BILITOT 0.3 03/22/2019    ALKPHOS 88 03/22/2019    AST 20 03/22/2019    ALT 20 03/22/2019     BMP:    Lab Results   Component Value Date     04/18/2019    K 3.7 04/18/2019    CL 97 04/18/2019    CO2 24 04/18/2019    BUN 27 04/18/2019    LABALBU 4.0 03/22/2019    CREATININE 1.12 04/18/2019    CALCIUM 9.7 04/18/2019    GFRAA 59.4 04/18/2019    LABGLOM 49.1 04/18/2019    GLUCOSE 101 04/18/2019     Magnesium:  No results found for: MG  TSH:  Lab Results   Component Value Date    TSH 2.660 04/09/2018       Patient Active Problem List   Diagnosis    Hypothyroidism    Hyperlipidemia    Menetrier disease    Endometriosis    HTN (hypertension), benign    Eczema    Bronchitis    Arthropathy of ankle and foot    Talipes    Generalized osteoarthrosis, unspecified site    Migraine    Mixed hyperlipidemia    Nonunion of fracture    Other congenital anomaly of lower limb, including pelvic girdle    Other specified congenital anomaly of spinal cord    Deformity of ankle and foot, acquired    Viral warts    Carotid stenosis    Active Meniere's disease, cochlear    Arthropathy of ankle and foot    Congenital anomaly of spinal cord ([facility] [address]    Left upper quadrant pain       There are no discontinued medications. Modified Medications    No medications on file       No orders of the defined types were placed in this encounter. Assessment:    1. HTN (hypertension), benign  - NM Myocardial Spect Rest Exercise or Rx; Future  - Echo 2D w doppler w color complete; Future    2.  Bilateral carotid artery stenosis  - US CAROTID ARTERY BILATERAL; Future    3. Angina at rest [facility])  - NM Myocardial Spect Rest Exercise or Rx; Future  - Echo 2D w doppler w color complete; Future       Plan:   Patient to have a lexiscan stress test, echocardiogram and bilateral carotid ultrasound. Stay on same medications. See me in 1 month to discuss test results. This note was partially generated using Dragon voice recognition system, and there may be some incorrect words, spellings, punctuation that were not noticed in checking the note before saving.         Electronically signed by Junior Ledesma MD on 5/29/2019 at 11:56 AM

## 2019-06-25 ENCOUNTER — HOSPITAL ENCOUNTER (OUTPATIENT)
Dept: NON INVASIVE DIAGNOSTICS | Age: 64
Discharge: HOME | End: 2019-06-25
Payer: COMMERCIAL

## 2019-06-25 ENCOUNTER — HOSPITAL ENCOUNTER (OUTPATIENT)
Dept: NUCLEAR MEDICINE | Age: 64
Discharge: HOME | End: 2019-06-27
Payer: COMMERCIAL

## 2019-06-25 ENCOUNTER — HOSPITAL ENCOUNTER (OUTPATIENT)
Dept: ULTRASOUND IMAGING | Age: 64
Discharge: HOME | End: 2019-06-27
Payer: COMMERCIAL

## 2019-06-25 VITALS — DIASTOLIC BLOOD PRESSURE: 62 MMHG | HEART RATE: 80 BPM | SYSTOLIC BLOOD PRESSURE: 115 MMHG

## 2019-06-25 DIAGNOSIS — I10 HTN (HYPERTENSION), BENIGN: ICD-10-CM

## 2019-06-25 DIAGNOSIS — I65.23 BILATERAL CAROTID ARTERY STENOSIS: ICD-10-CM

## 2019-06-25 DIAGNOSIS — I20.8 ANGINA AT REST (HCC): ICD-10-CM

## 2019-06-25 LAB
LV EF: 60 %
LVEF MODALITY: NORMAL

## 2019-06-25 PROCEDURE — 78452 HT MUSCLE IMAGE SPECT MULT: CPT

## 2019-06-25 PROCEDURE — 6360000002 HC RX W HCPCS: Performed by: INTERNAL MEDICINE

## 2019-06-25 PROCEDURE — A9502 TC99M TETROFOSMIN: HCPCS | Performed by: INTERNAL MEDICINE

## 2019-06-25 PROCEDURE — 3430000000 HC RX DIAGNOSTIC RADIOPHARMACEUTICAL: Performed by: INTERNAL MEDICINE

## 2019-06-25 PROCEDURE — 93880 EXTRACRANIAL BILAT STUDY: CPT

## 2019-06-25 PROCEDURE — 2580000003 HC RX 258: Performed by: INTERNAL MEDICINE

## 2019-06-25 PROCEDURE — 93017 CV STRESS TEST TRACING ONLY: CPT

## 2019-06-25 PROCEDURE — 93306 TTE W/DOPPLER COMPLETE: CPT

## 2019-06-25 RX ORDER — SODIUM CHLORIDE 0.9 % (FLUSH) 0.9 %
10 SYRINGE (ML) INJECTION PRN
Status: DISCONTINUED | OUTPATIENT
Start: 2019-06-25 | End: 2019-06-28 | Stop reason: HOSPADM

## 2019-06-25 RX ADMIN — Medication 10 ML: at 11:33

## 2019-06-25 RX ADMIN — Medication 10 ML: at 09:17

## 2019-06-25 RX ADMIN — REGADENOSON 0.4 MG: 0.08 INJECTION, SOLUTION INTRAVENOUS at 11:33

## 2019-06-25 RX ADMIN — TETROFOSMIN 11.5 MILLICURIE: 1.38 INJECTION, POWDER, LYOPHILIZED, FOR SOLUTION INTRAVENOUS at 09:16

## 2019-06-25 RX ADMIN — Medication 10 ML: at 11:35

## 2019-06-25 RX ADMIN — TETROFOSMIN 32.5 MILLICURIE: 1.38 INJECTION, POWDER, LYOPHILIZED, FOR SOLUTION INTRAVENOUS at 11:34

## 2019-06-26 PROCEDURE — 93018 CV STRESS TEST I&R ONLY: CPT | Performed by: INTERNAL MEDICINE

## 2019-06-26 PROCEDURE — 93016 CV STRESS TEST SUPVJ ONLY: CPT | Performed by: INTERNAL MEDICINE

## 2019-06-26 PROCEDURE — 78452 HT MUSCLE IMAGE SPECT MULT: CPT | Performed by: INTERNAL MEDICINE

## 2019-06-26 NOTE — PROCEDURES
[address] [address]                              CARDIAC STRESS TEST    PATIENT NAME: Deborah Mahoney                      :        1955  MED REC NO:   [id number]                              ROOM:  ACCOUNT NO:   [de-identified]                           ADMIT DATE: 2019  PROVIDER:     Faboila Bhatt MD      DATE OF STUDY:  2019    ORDERING PROVIDERS:  Jennifer Howe MD and Fabiola Bhatt MD    TECHNIQUE:  At rest, the patient was injected with 11.5 mCi of Myoview. Resting images were obtained. The patient was then given 0.4 mg of  Lexiscan followed by administration of 32.5 mCi of Myoview. Stress  tomographic images were then obtained. Left ventricular ejection  fraction and gated wall motion were acquired. RESULTS:  Resting heart rate was 66 beats per minute. Maximum heart  rate achieved was 98 beats per minute. No diagnostic ST-segment changes  were noted. IMAGING RESULTS:  Review of rest and stress tomographic images revealed  homogenous myocardial perfusion with no evidence of prior myocardial  infarction or ischemia. Left ventricular ejection fraction is 74%. TID  ratio 1.06, which is within normal limits. IMPRESSION:  1. Homogenous myocardial perfusion with no evidence of prior myocardial  infarction or ischemia. 2.  Normal left ventricular ejection fraction of 74%. 3.  TID ratio 1.06, which is within normal limits.         Aracely Olvera MD    D: 2019 8:25:09       T: 2019 8:43:08     [email or web address]  Job#: [id number]     Doc#: [id number]    CC:

## 2019-07-02 ENCOUNTER — OFFICE VISIT (OUTPATIENT)
Dept: CARDIOLOGY CLINIC | Age: 64
End: 2019-07-02
Payer: COMMERCIAL

## 2019-07-02 VITALS
HEART RATE: 80 BPM | OXYGEN SATURATION: 97 % | HEIGHT: 62 IN | WEIGHT: 169 LBS | DIASTOLIC BLOOD PRESSURE: 82 MMHG | SYSTOLIC BLOOD PRESSURE: 122 MMHG | RESPIRATION RATE: 20 BRPM | BODY MASS INDEX: 31.1 KG/M2

## 2019-07-02 DIAGNOSIS — I10 HTN (HYPERTENSION), BENIGN: ICD-10-CM

## 2019-07-02 DIAGNOSIS — I65.23 BILATERAL CAROTID ARTERY STENOSIS: Primary | ICD-10-CM

## 2019-07-02 DIAGNOSIS — R07.9 CHEST PAIN, UNSPECIFIED TYPE: ICD-10-CM

## 2019-07-02 PROCEDURE — G8427 DOCREV CUR MEDS BY ELIG CLIN: HCPCS | Performed by: INTERNAL MEDICINE

## 2019-07-02 PROCEDURE — 1036F TOBACCO NON-USER: CPT | Performed by: INTERNAL MEDICINE

## 2019-07-02 PROCEDURE — 99213 OFFICE O/P EST LOW 20 MIN: CPT | Performed by: INTERNAL MEDICINE

## 2019-07-02 PROCEDURE — G8598 ASA/ANTIPLAT THER USED: HCPCS | Performed by: INTERNAL MEDICINE

## 2019-07-02 PROCEDURE — 3017F COLORECTAL CA SCREEN DOC REV: CPT | Performed by: INTERNAL MEDICINE

## 2019-07-02 PROCEDURE — G8417 CALC BMI ABV UP PARAM F/U: HCPCS | Performed by: INTERNAL MEDICINE

## 2019-07-02 ASSESSMENT — ENCOUNTER SYMPTOMS
COLOR CHANGE: 0
APNEA: 0
NAUSEA: 0
VOMITING: 0
ANAL BLEEDING: 0
VOICE CHANGE: 0
FACIAL SWELLING: 0
TROUBLE SWALLOWING: 0
DIARRHEA: 0
BLOOD IN STOOL: 0
ABDOMINAL DISTENTION: 0
CHEST TIGHTNESS: 0
WHEEZING: 0
SHORTNESS OF BREATH: 0

## 2019-07-02 NOTE — PROGRESS NOTES
[facility] CARDIOLOGY OFFICE FOLLOW-UP      Patient: Librado Das  YOB: 1955  MRN: [id number]    Chief Complaint:  Chief Complaint   Patient presents with    Results     Echo / Stress / U/S Carotid test done    Hypertension         Subjective/HPI:  7/2/19: Patient presents today for follow-up of chest pain. Both stress test and echo unremarkable. She has below left knee amputation. Has a history of clubfoot. Has a history of high both thyroidism. Carotid showed less than 50% stenosis. She does not smoke. She will see me in 1 year. 5/29/19: Patient presents today for follow-up of chest pain. Has below LK amputation. Has a history of mild carotid disease. Does not smoke. Has hypertension and neuropathy. She also has hypothyroidism. I will repeat a Lexiscan, echo and a carotid ultrasound and then see me     11/27/18: Patient presents today for follow-up of hypertension. Chest pain is resolved. Does not smoke. No dizziness CHF or syncope. Has hypothyroidism and bipolar triglyceridemia. See me in 6 months.     5/29/18: Patient presents today for evaluation of chest pain. She saw me about 3 years ago. Her  is a patient of mine. And underwent bypass surgery recently. She had one episode of chest pain when that they were having this moment with him needing bypass surgery. She will all stressed out. Chest pain was left-sided. No nausea no vomiting. It has not recurred. Does not smoke. She has hypothyroidism hypertension hyperlipidemia that is stable. She'll see me in 6 months if the chest pain recurs, then consider stress test.     7/8/15: Patient is followed on a regular basis by Dr. Gemma Rivera DO. BP much better. Palpitations better with toprol. Non smoker. See in [facility]         Past Medical History:   Diagnosis Date    Bronchitis 4/29/2014    Carotid stenosis     Eczema 4/29/2014    Endometriosis     Headache(784.0)     Hyperlipidemia     Hypertension     Hypothyroidism     seizures, syncope, facial asymmetry, weakness, light-headedness, numbness and headaches. Hematological: Does not bruise/bleed easily. Psychiatric/Behavioral: Negative for agitation, behavioral problems, confusion, hallucinations and suicidal ideas. The patient is not nervous/anxious. All other systems reviewed and are negative. Review of System is negative except for as mentioned above. Physical Examination:    /82 (Site: Right Upper Arm, Position: Sitting, Cuff Size: Medium Adult)   Pulse 80   Resp 20   Ht 5' 2\" (1.575 m)   Wt 169 lb (76.7 kg)   LMP  (LMP Unknown)   SpO2 97%   BMI 30.91 kg/m²    Physical Exam   Constitutional: She appears healthy. HENT:   Nose: Nose normal.   Mouth/Throat: Dentition is normal. Oropharynx is clear. Eyes: Pupils are equal, round, and reactive to light. Neck: Normal range of motion. Cardiovascular: Normal rate, regular rhythm, S1 normal, S2 normal, normal heart sounds, intact distal pulses and normal pulses. No extrasystoles are present. Exam reveals no gallop. No murmur heard. Pulmonary/Chest: Effort normal and breath sounds normal. She has no wheezes. She has no rales. She exhibits no tenderness. Abdominal: Soft. Bowel sounds are normal. She exhibits no distension and no mass. There is no splenomegaly or hepatomegaly. There is no tenderness. Musculoskeletal: Normal range of motion. She exhibits no edema, tenderness or deformity. Neurological: She is alert and oriented to person, place, and time. She has normal motor skills and normal reflexes. Gait normal.   Skin: Skin is warm and dry.            Patient Active Problem List   Diagnosis    Hypothyroidism    Hyperlipidemia    Menetrier disease    Endometriosis    HTN (hypertension), benign    Eczema    Bronchitis    Arthropathy of ankle and foot    Talipes    Generalized osteoarthrosis, unspecified site    Migraine    Mixed hyperlipidemia    Nonunion of fracture    Other

## 2019-07-26 ENCOUNTER — TELEPHONE (OUTPATIENT)
Dept: FAMILY MEDICINE CLINIC | Age: 64
End: 2019-07-26

## 2019-07-26 ENCOUNTER — NURSE ONLY (OUTPATIENT)
Dept: FAMILY MEDICINE CLINIC | Age: 64
End: 2019-07-26
Payer: COMMERCIAL

## 2019-07-26 DIAGNOSIS — E11.9 TYPE 2 DIABETES MELLITUS WITHOUT COMPLICATION, WITHOUT LONG-TERM CURRENT USE OF INSULIN (HCC): ICD-10-CM

## 2019-07-26 DIAGNOSIS — R73.09 ELEVATED GLUCOSE: Primary | ICD-10-CM

## 2019-07-26 LAB — HBA1C MFR BLD: 6.6 %

## 2019-07-26 PROCEDURE — 83036 HEMOGLOBIN GLYCOSYLATED A1C: CPT | Performed by: FAMILY MEDICINE

## 2019-08-05 ENCOUNTER — OFFICE VISIT (OUTPATIENT)
Dept: FAMILY MEDICINE CLINIC | Age: 64
End: 2019-08-05
Payer: COMMERCIAL

## 2019-08-05 VITALS
WEIGHT: 170 LBS | OXYGEN SATURATION: 95 % | SYSTOLIC BLOOD PRESSURE: 106 MMHG | DIASTOLIC BLOOD PRESSURE: 68 MMHG | TEMPERATURE: 98 F | BODY MASS INDEX: 31.09 KG/M2 | HEART RATE: 72 BPM

## 2019-08-05 DIAGNOSIS — R21 SKIN RASH: ICD-10-CM

## 2019-08-05 DIAGNOSIS — F51.01 PRIMARY INSOMNIA: ICD-10-CM

## 2019-08-05 DIAGNOSIS — M79.644 THUMB PAIN, RIGHT: ICD-10-CM

## 2019-08-05 DIAGNOSIS — E11.9 TYPE 2 DIABETES MELLITUS WITHOUT COMPLICATION, WITHOUT LONG-TERM CURRENT USE OF INSULIN (HCC): Primary | ICD-10-CM

## 2019-08-05 PROCEDURE — 3017F COLORECTAL CA SCREEN DOC REV: CPT | Performed by: FAMILY MEDICINE

## 2019-08-05 PROCEDURE — 1036F TOBACCO NON-USER: CPT | Performed by: FAMILY MEDICINE

## 2019-08-05 PROCEDURE — G8598 ASA/ANTIPLAT THER USED: HCPCS | Performed by: FAMILY MEDICINE

## 2019-08-05 PROCEDURE — G8427 DOCREV CUR MEDS BY ELIG CLIN: HCPCS | Performed by: FAMILY MEDICINE

## 2019-08-05 PROCEDURE — 99214 OFFICE O/P EST MOD 30 MIN: CPT | Performed by: FAMILY MEDICINE

## 2019-08-05 PROCEDURE — 3044F HG A1C LEVEL LT 7.0%: CPT | Performed by: FAMILY MEDICINE

## 2019-08-05 PROCEDURE — 2022F DILAT RTA XM EVC RTNOPTHY: CPT | Performed by: FAMILY MEDICINE

## 2019-08-05 PROCEDURE — G8417 CALC BMI ABV UP PARAM F/U: HCPCS | Performed by: FAMILY MEDICINE

## 2019-08-05 RX ORDER — ZOLPIDEM TARTRATE 5 MG/1
TABLET ORAL
Qty: 90 TABLET | Refills: 1 | Status: SHIPPED | OUTPATIENT
Start: 2019-08-05 | End: 2020-02-05

## 2019-08-05 ASSESSMENT — ENCOUNTER SYMPTOMS
COUGH: 0
ABDOMINAL PAIN: 0
RHINORRHEA: 0
SORE THROAT: 0
CONSTIPATION: 0
SHORTNESS OF BREATH: 0
WHEEZING: 0
DIARRHEA: 0

## 2019-08-05 NOTE — PROGRESS NOTES
[address] PRIMARY CARE  [place] [address] [id number]  Dept: [phone]  Dept Fax: : [phone]   Chief Complaint  Chief Complaint   Patient presents with   [facility] Results     pt here to discuss A1C results from 19       HPI:  62 y. o.female who presents for DM2:    Newly diagnosed DM2: a1c 6.6 from 6.3; not on meds; compliant with diet; No excessive thirst, urination, or blurry vision. Both parents were diabetic. Hx of LLE AKA  for a birth deformity that plagued her. Notes wt gain and less activity since that time. R thumb pain: only pain in the joint at the base of thumb. Hurts all the times and worse with holding things    Arm rash: has itchy lesions. Scratches often. Has tried    Insomnia: Has been on ambien for the past 2-3 years since getting the leg amputation. Uses nightly. Had been having much anxiety about it and couldn't sleep. Also takes gabapentin for nerve pain in the leg. These measures have been helpful. Past Medical History:   Diagnosis Date    Bronchitis 2014    Carotid stenosis     Eczema 2014    Endometriosis     Headache(784.0)     Hyperlipidemia     Hypertension     Hypothyroidism     Menetrier disease     Osteoarthritis      Past Surgical History:   Procedure Laterality Date     SECTION      CHOLECYSTECTOMY      COLONOSCOPY  14    DR. Abi Reyez    COLONOSCOPY  2017    DR Abi Reyez    HEEL SPUR SURGERY  2014    HERNIA REPAIR  2007    HYSTERECTOMY      jayme    LEG AMPUTATION BELOW KNEE Left 2016    UPPER GASTROINTESTINAL ENDOSCOPY  11/15/2013    DR. MCMAHON     Social History     Socioeconomic History    Marital status:      Spouse name: Not on file    Number of children: Not on file    Years of education: Not on file    Highest education level: Not on file   Occupational History    Not on file   Social Needs    tablet 1    triamterene-hydrochlorothiazide (MAXZIDE) 75-50 MG per tablet Take one(1) tablet daily. 90 tablet 1    vitamin D (ERGOCALCIFEROL) 51398 units CAPS capsule Take 1 capsule by mouth once a week 12 capsule 0    gabapentin (NEURONTIN) 600 MG tablet Take 1 tablet by mouth 2 times daily for 90 days. 180 tablet 1    aspirin 81 MG tablet Take 81 mg by mouth. No current facility-administered medications for this visit. ROS:  Review of Systems   Constitutional: Negative for chills and fever. HENT: Negative for rhinorrhea and sore throat. Respiratory: Negative for cough, shortness of breath and wheezing. Gastrointestinal: Negative for abdominal pain, constipation and diarrhea. Endocrine: Negative for polydipsia and polyuria. Genitourinary: Negative for dysuria, frequency and urgency. Musculoskeletal: Positive for arthralgias. Skin: Positive for rash. Neurological: Negative for syncope, light-headedness, numbness and headaches. Psychiatric/Behavioral: Negative for sleep disturbance. The patient is not nervous/anxious. Vitals:    08/05/19 1309   BP: 106/68   Site: Right Upper Arm   Position: Sitting   Cuff Size: Large Adult   Pulse: 72   Temp: 98 °F (36.7 °C)   TempSrc: Oral   SpO2: 95%   Weight: 170 lb (77.1 kg)       Physical exam:  Physical Exam   Constitutional: She is oriented to person, place, and time. She appears well-developed and well-nourished. No distress. HENT:   Head: Normocephalic and atraumatic. Mouth/Throat: No oropharyngeal exudate. Eyes: EOM are normal.   Neck: Normal range of motion. No thyromegaly present. Cardiovascular: Normal rate, regular rhythm and normal heart sounds. No murmur heard. Pulmonary/Chest: Effort normal and breath sounds normal. No respiratory distress. She has no wheezes. Abdominal: Soft. She exhibits no distension. There is no tenderness. There is no rebound and no guarding. Musculoskeletal: She exhibits no edema.

## 2020-02-28 ENCOUNTER — TELEPHONE (OUTPATIENT)
Dept: FAMILY MEDICINE CLINIC | Age: 65
End: 2020-02-28

## 2022-04-06 ENCOUNTER — COMMUNITY OUTREACH (OUTPATIENT)
Dept: INTERNAL MEDICINE | Age: 67
End: 2022-04-06

## 2022-04-06 NOTE — PROGRESS NOTES
Patient's HM shows they are overdue for Mammogram, Colorectal Screening and Cervical Cancer Screening. [facility] and  files searched without success. No results to attach to order nor HM updated. No upcoming appointment. Spoke with patient, she is seeing Dr. Merlin Sly now.

## 2023-02-05 PROBLEM — E55.9 VITAMIN D DEFICIENCY: Status: ACTIVE | Noted: 2023-02-05

## 2023-02-05 PROBLEM — R11.0 NAUSEA IN ADULT: Status: ACTIVE | Noted: 2023-02-05

## 2023-02-05 PROBLEM — F32.A DEPRESSION: Status: ACTIVE | Noted: 2023-02-05

## 2023-02-05 PROBLEM — G62.9 NEUROPATHY: Status: ACTIVE | Noted: 2023-02-05

## 2023-02-05 PROBLEM — R92.8 ABNORMAL MAMMOGRAM: Status: ACTIVE | Noted: 2023-02-05

## 2023-02-05 PROBLEM — G43.909 MIGRAINE: Status: ACTIVE | Noted: 2023-02-05

## 2023-02-05 PROBLEM — N64.59 BREAST COMPLAINT: Status: ACTIVE | Noted: 2023-02-05

## 2023-02-05 PROBLEM — E11.69 DM TYPE 2 WITH DIABETIC DYSLIPIDEMIA (MULTI): Status: ACTIVE | Noted: 2023-02-05

## 2023-02-05 PROBLEM — K27.9 PEPTIC ULCER DISEASE: Status: ACTIVE | Noted: 2023-02-05

## 2023-02-05 PROBLEM — J30.1 ALLERGIC RHINITIS DUE TO POLLEN: Status: ACTIVE | Noted: 2023-02-05

## 2023-02-05 PROBLEM — M54.50 PAIN IN LEFT LUMBAR REGION OF BACK: Status: ACTIVE | Noted: 2023-02-05

## 2023-02-05 PROBLEM — H81.12 BENIGN PAROXYSMAL POSITIONAL VERTIGO OF LEFT EAR: Status: ACTIVE | Noted: 2023-02-05

## 2023-02-05 PROBLEM — G47.00 INSOMNIA: Status: ACTIVE | Noted: 2023-02-05

## 2023-02-05 PROBLEM — E78.5 DM TYPE 2 WITH DIABETIC DYSLIPIDEMIA (MULTI): Status: ACTIVE | Noted: 2023-02-05

## 2023-02-05 PROBLEM — K57.32 DIVERTICULITIS OF COLON: Status: ACTIVE | Noted: 2023-02-05

## 2023-02-05 PROBLEM — M10.9 GOUTY ARTHRITIS OF RIGHT GREAT TOE: Status: ACTIVE | Noted: 2023-02-05

## 2023-02-05 PROBLEM — E78.5 HYPERLIPIDEMIA: Status: ACTIVE | Noted: 2023-02-05

## 2023-02-05 PROBLEM — R42 DIZZINESS: Status: ACTIVE | Noted: 2023-02-05

## 2023-02-05 PROBLEM — I10 BENIGN ESSENTIAL HYPERTENSION: Status: ACTIVE | Noted: 2023-02-05

## 2023-02-05 PROBLEM — L72.3 SEBACEOUS CYST: Status: ACTIVE | Noted: 2023-02-05

## 2023-02-05 PROBLEM — K42.9 UMBILICAL HERNIA: Status: ACTIVE | Noted: 2023-02-05

## 2023-02-05 PROBLEM — E03.9 HYPOTHYROIDISM: Status: ACTIVE | Noted: 2023-02-05

## 2023-02-05 PROBLEM — K43.2 INCISIONAL HERNIA: Status: ACTIVE | Noted: 2023-02-05

## 2023-02-05 PROBLEM — M81.0 AGE RELATED OSTEOPOROSIS: Status: ACTIVE | Noted: 2023-02-05

## 2023-02-05 RX ORDER — LOSARTAN POTASSIUM 25 MG/1
1 TABLET ORAL DAILY
COMMUNITY
Start: 2022-04-13 | End: 2023-05-01 | Stop reason: SDUPTHER

## 2023-02-05 RX ORDER — ZOLPIDEM TARTRATE 5 MG/1
1 TABLET ORAL NIGHTLY PRN
COMMUNITY
Start: 2019-03-01 | End: 2023-10-02 | Stop reason: SDUPTHER

## 2023-02-05 RX ORDER — VALACYCLOVIR HYDROCHLORIDE 1 G/1
2 TABLET, FILM COATED ORAL DAILY
COMMUNITY
End: 2023-05-01 | Stop reason: SDUPTHER

## 2023-02-05 RX ORDER — CLOBETASOL PROPIONATE 0.5 MG/G
CREAM TOPICAL
COMMUNITY
Start: 2022-06-01

## 2023-02-05 RX ORDER — SIMVASTATIN 10 MG/1
1 TABLET, FILM COATED ORAL NIGHTLY
COMMUNITY
Start: 2019-09-26 | End: 2023-05-01 | Stop reason: SDUPTHER

## 2023-02-05 RX ORDER — THYROID 30 MG/1
1 TABLET ORAL DAILY
COMMUNITY
Start: 2019-10-21 | End: 2023-05-01 | Stop reason: SDUPTHER

## 2023-02-05 RX ORDER — ERGOCALCIFEROL 1.25 MG/1
1 CAPSULE ORAL
COMMUNITY
Start: 2019-03-19 | End: 2023-05-01 | Stop reason: SDUPTHER

## 2023-02-05 RX ORDER — GABAPENTIN 600 MG/1
1 TABLET ORAL 2 TIMES DAILY
COMMUNITY
Start: 2019-03-19 | End: 2023-05-01 | Stop reason: SDUPTHER

## 2023-02-05 RX ORDER — SERTRALINE HYDROCHLORIDE 50 MG/1
1 TABLET, FILM COATED ORAL DAILY
COMMUNITY
Start: 2019-09-26 | End: 2023-05-01 | Stop reason: SDUPTHER

## 2023-03-13 LAB
ANION GAP IN SER/PLAS: 12 MMOL/L (ref 10–20)
CALCIUM (MG/DL) IN SER/PLAS: 9.5 MG/DL (ref 8.6–10.3)
CARBON DIOXIDE, TOTAL (MMOL/L) IN SER/PLAS: 28 MMOL/L (ref 21–32)
CHLORIDE (MMOL/L) IN SER/PLAS: 103 MMOL/L (ref 98–107)
CREATININE (MG/DL) IN SER/PLAS: 1.37 MG/DL (ref 0.5–1.05)
ERYTHROCYTE DISTRIBUTION WIDTH (RATIO) BY AUTOMATED COUNT: 14.1 % (ref 11.5–14.5)
ERYTHROCYTE MEAN CORPUSCULAR HEMOGLOBIN CONCENTRATION (G/DL) BY AUTOMATED: 32 G/DL (ref 32–36)
ERYTHROCYTE MEAN CORPUSCULAR VOLUME (FL) BY AUTOMATED COUNT: 94 FL (ref 80–100)
ERYTHROCYTES (10*6/UL) IN BLOOD BY AUTOMATED COUNT: 4.39 X10E12/L (ref 4–5.2)
GFR FEMALE: 42 ML/MIN/1.73M2
GLUCOSE (MG/DL) IN SER/PLAS: 133 MG/DL (ref 74–99)
HEMATOCRIT (%) IN BLOOD BY AUTOMATED COUNT: 41.2 % (ref 36–46)
HEMOGLOBIN (G/DL) IN BLOOD: 13.2 G/DL (ref 12–16)
LEUKOCYTES (10*3/UL) IN BLOOD BY AUTOMATED COUNT: 8 X10E9/L (ref 4.4–11.3)
NRBC (PER 100 WBCS) BY AUTOMATED COUNT: 0 /100 WBC (ref 0–0)
PLATELETS (10*3/UL) IN BLOOD AUTOMATED COUNT: 260 X10E9/L (ref 150–450)
POTASSIUM (MMOL/L) IN SER/PLAS: 4.3 MMOL/L (ref 3.5–5.3)
SODIUM (MMOL/L) IN SER/PLAS: 139 MMOL/L (ref 136–145)
UREA NITROGEN (MG/DL) IN SER/PLAS: 34 MG/DL (ref 6–23)

## 2023-05-01 ENCOUNTER — OFFICE VISIT (OUTPATIENT)
Dept: PRIMARY CARE | Facility: CLINIC | Age: 68
End: 2023-05-01
Payer: MEDICARE

## 2023-05-01 VITALS
SYSTOLIC BLOOD PRESSURE: 126 MMHG | HEIGHT: 62 IN | BODY MASS INDEX: 28.89 KG/M2 | WEIGHT: 157 LBS | OXYGEN SATURATION: 98 % | HEART RATE: 67 BPM | DIASTOLIC BLOOD PRESSURE: 68 MMHG

## 2023-05-01 DIAGNOSIS — E78.2 MIXED HYPERLIPIDEMIA: ICD-10-CM

## 2023-05-01 DIAGNOSIS — E55.9 VITAMIN D DEFICIENCY: ICD-10-CM

## 2023-05-01 DIAGNOSIS — Z00.00 ENCOUNTER FOR MEDICARE ANNUAL WELLNESS EXAM: Primary | ICD-10-CM

## 2023-05-01 DIAGNOSIS — M10.9 GOUTY ARTHRITIS OF RIGHT GREAT TOE: ICD-10-CM

## 2023-05-01 DIAGNOSIS — F32.9 MAJOR DEPRESSIVE DISORDER, REMISSION STATUS UNSPECIFIED, UNSPECIFIED WHETHER RECURRENT: ICD-10-CM

## 2023-05-01 DIAGNOSIS — Z71.89 ACP (ADVANCE CARE PLANNING): ICD-10-CM

## 2023-05-01 DIAGNOSIS — E03.9 ACQUIRED HYPOTHYROIDISM: ICD-10-CM

## 2023-05-01 DIAGNOSIS — Q06.9 CONGENITAL ANOMALY OF SPINAL CORD (MULTI): ICD-10-CM

## 2023-05-01 DIAGNOSIS — B00.1 COLD SORE: ICD-10-CM

## 2023-05-01 DIAGNOSIS — E11.69 DM TYPE 2 WITH DIABETIC DYSLIPIDEMIA (MULTI): ICD-10-CM

## 2023-05-01 DIAGNOSIS — E78.5 DM TYPE 2 WITH DIABETIC DYSLIPIDEMIA (MULTI): ICD-10-CM

## 2023-05-01 DIAGNOSIS — I10 BENIGN ESSENTIAL HYPERTENSION: ICD-10-CM

## 2023-05-01 DIAGNOSIS — Z89.512 STATUS POST BELOW-KNEE AMPUTATION OF LEFT LOWER EXTREMITY (MULTI): ICD-10-CM

## 2023-05-01 PROBLEM — N80.9 ENDOMETRIOSIS: Status: ACTIVE | Noted: 2023-05-01

## 2023-05-01 PROBLEM — C50.919 INVASIVE LOBULAR CARCINOMA OF BREAST IN FEMALE (MULTI): Status: ACTIVE | Noted: 2023-05-01

## 2023-05-01 PROBLEM — N18.31 STAGE 3A CHRONIC KIDNEY DISEASE (MULTI): Status: ACTIVE | Noted: 2023-05-01

## 2023-05-01 PROCEDURE — 99212 OFFICE O/P EST SF 10 MIN: CPT | Performed by: FAMILY MEDICINE

## 2023-05-01 PROCEDURE — G0439 PPPS, SUBSEQ VISIT: HCPCS | Performed by: FAMILY MEDICINE

## 2023-05-01 RX ORDER — THYROID 30 MG/1
30 TABLET ORAL DAILY
Qty: 90 TABLET | Refills: 1 | Status: SHIPPED | OUTPATIENT
Start: 2023-05-01 | End: 2023-10-19

## 2023-05-01 RX ORDER — SIMVASTATIN 10 MG/1
10 TABLET, FILM COATED ORAL NIGHTLY
Qty: 90 TABLET | Refills: 1 | Status: SHIPPED | OUTPATIENT
Start: 2023-05-01 | End: 2023-10-19

## 2023-05-01 RX ORDER — IBUPROFEN 200 MG
CAPSULE ORAL
Qty: 100 STRIP | Refills: 0 | Status: SHIPPED | OUTPATIENT
Start: 2023-05-01

## 2023-05-01 RX ORDER — LOSARTAN POTASSIUM 25 MG/1
25 TABLET ORAL DAILY
Qty: 90 TABLET | Refills: 1 | Status: SHIPPED | OUTPATIENT
Start: 2023-05-01 | End: 2023-11-19

## 2023-05-01 RX ORDER — SERTRALINE HYDROCHLORIDE 50 MG/1
50 TABLET, FILM COATED ORAL DAILY
Qty: 90 TABLET | Refills: 1 | Status: SHIPPED | OUTPATIENT
Start: 2023-05-01 | End: 2023-10-10

## 2023-05-01 RX ORDER — GABAPENTIN 600 MG/1
600 TABLET ORAL 2 TIMES DAILY
Qty: 180 TABLET | Refills: 0 | Status: SHIPPED | OUTPATIENT
Start: 2023-05-01 | End: 2023-10-02 | Stop reason: SDUPTHER

## 2023-05-01 RX ORDER — VALACYCLOVIR HYDROCHLORIDE 1 G/1
2000 TABLET, FILM COATED ORAL DAILY
Qty: 60 TABLET | Refills: 2 | Status: SHIPPED | OUTPATIENT
Start: 2023-05-01 | End: 2023-10-02 | Stop reason: ALTCHOICE

## 2023-05-01 RX ORDER — IBUPROFEN 200 MG
CAPSULE ORAL
COMMUNITY
End: 2023-05-01 | Stop reason: SDUPTHER

## 2023-05-01 RX ORDER — ERGOCALCIFEROL 1.25 MG/1
1 CAPSULE ORAL
Qty: 12 CAPSULE | Refills: 1 | Status: SHIPPED | OUTPATIENT
Start: 2023-05-01 | End: 2024-03-04

## 2023-05-01 ASSESSMENT — ENCOUNTER SYMPTOMS
TROUBLE SWALLOWING: 0
CHEST TIGHTNESS: 0
NERVOUS/ANXIOUS: 0
BLOOD IN STOOL: 0
DYSPHORIC MOOD: 0
MYALGIAS: 0
DECREASED CONCENTRATION: 0
COLOR CHANGE: 0
LOSS OF SENSATION IN FEET: 0
SEIZURES: 0
EYE PAIN: 0
APPETITE CHANGE: 0
DIZZINESS: 0
COUGH: 0
PHOTOPHOBIA: 0
SINUS PAIN: 0
ADENOPATHY: 0
CONSTIPATION: 0
STRIDOR: 0
RECTAL PAIN: 0
FATIGUE: 0
RHINORRHEA: 0
POLYDIPSIA: 0
POLYPHAGIA: 0
OCCASIONAL FEELINGS OF UNSTEADINESS: 0
FEVER: 0
DEPRESSION: 0
SHORTNESS OF BREATH: 0
HEMATURIA: 0
DIARRHEA: 0
ARTHRALGIAS: 0
PALPITATIONS: 0
ACTIVITY CHANGE: 0
ABDOMINAL PAIN: 0
SORE THROAT: 0
HEADACHES: 0
ABDOMINAL DISTENTION: 0
DYSURIA: 0
SLEEP DISTURBANCE: 0
CONSTITUTIONAL NEGATIVE: 1
SINUS PRESSURE: 0
SPEECH DIFFICULTY: 0
NECK STIFFNESS: 0
FLANK PAIN: 0
CONFUSION: 0
AGITATION: 0

## 2023-05-01 ASSESSMENT — PATIENT HEALTH QUESTIONNAIRE - PHQ9
1. LITTLE INTEREST OR PLEASURE IN DOING THINGS: NOT AT ALL
2. FEELING DOWN, DEPRESSED OR HOPELESS: NOT AT ALL
SUM OF ALL RESPONSES TO PHQ9 QUESTIONS 1 AND 2: 0

## 2023-05-01 ASSESSMENT — ACTIVITIES OF DAILY LIVING (ADL)
DRESSING: INDEPENDENT
BATHING: INDEPENDENT
GROCERY_SHOPPING: INDEPENDENT
TAKING_MEDICATION: INDEPENDENT
MANAGING_FINANCES: INDEPENDENT
DOING_HOUSEWORK: INDEPENDENT

## 2023-05-01 NOTE — PATIENT INSTRUCTIONS
Follow up in 3 months    Continue current medications and therapy for chronic medical conditions.    Patient was advised importance of proper diet/nutrition in addition adequate hydration. Patient was encouraged moderate exercise program to include 30 minutes daily for 5 days of the week or 150 minutes weekly. Patient will follow-up with us as scheduled.    OBTAIN FASTING LIPID, CMP, TSH AND A1C    Review surgical path report from March 2023    Obtain fasting lipid profile, CMP, CBC, uric acid and sedimentation rate in addition to thyroid study    Complete Medicare annual wellness physical/exam

## 2023-05-01 NOTE — PROGRESS NOTES
Subjective   Reason for Visit: Alejandra Chowdhury is an 68 y.o. female here for a Medicare Wellness visit.     Past Medical, Surgical, and Family History reviewed and updated in chart.    Reviewed all medications by prescribing practitioner or clinical pharmacist (such as prescriptions, OTCs, herbal therapies and supplements) and documented in the medical record.    HPI    Patient Care Team:  Cm Hanley DO as PCP - General  Cm Hanley DO as PCP - [facility] Attributed Provider     Review of Systems   Constitutional: Negative.  Negative for activity change, appetite change, fatigue and fever.   HENT:  Negative for congestion, dental problem, ear discharge, ear pain, mouth sores, rhinorrhea, sinus pressure, sinus pain, sore throat, tinnitus and trouble swallowing.    Eyes:  Negative for photophobia, pain and visual disturbance.   Respiratory:  Negative for cough, chest tightness, shortness of breath and stridor.    Cardiovascular:  Negative for chest pain and palpitations.   Gastrointestinal:  Negative for abdominal distention, abdominal pain, blood in stool, constipation, diarrhea and rectal pain.   Endocrine: Negative for cold intolerance, heat intolerance, polydipsia, polyphagia and polyuria.   Genitourinary:  Negative for dysuria, flank pain, hematuria and urgency.   Musculoskeletal:  Negative for arthralgias, gait problem, myalgias and neck stiffness.   Skin:  Negative for color change and rash.   Allergic/Immunologic: Negative for environmental allergies and food allergies.   Neurological:  Negative for dizziness, seizures, syncope, speech difficulty and headaches.   Hematological:  Negative for adenopathy.   Psychiatric/Behavioral:  Negative for agitation, confusion, decreased concentration, dysphoric mood and sleep disturbance. The patient is not nervous/anxious.        Objective   Vitals:  /68 (BP Location: Left arm, Patient Position: Sitting, BP Cuff Size: Small adult)   Pulse 67   Ht 1.575  "m (5' 2\")   Wt 71.2 kg (157 lb)   LMP  (LMP Unknown)   SpO2 98%   BMI 28.72 kg/m²       Physical Exam  Vitals reviewed.   Constitutional:       General: She is not in acute distress.     Appearance: Normal appearance. She is normal weight. She is not ill-appearing or diaphoretic.   HENT:      Head: Normocephalic.      Right Ear: Tympanic membrane and external ear normal.      Left Ear: Tympanic membrane and external ear normal.      Nose: Nose normal. No congestion.      Mouth/Throat:      Mouth: Mucous membranes are dry.      Pharynx: No posterior oropharyngeal erythema.   Eyes:      General:         Right eye: No discharge.         Left eye: No discharge.      Extraocular Movements: Extraocular movements intact.      Conjunctiva/sclera: Conjunctivae normal.      Pupils: Pupils are equal, round, and reactive to light.   Cardiovascular:      Rate and Rhythm: Normal rate and regular rhythm.      Pulses: Normal pulses.      Heart sounds: Normal heart sounds. No murmur heard.  Pulmonary:      Effort: Pulmonary effort is normal. No respiratory distress.      Breath sounds: Normal breath sounds. No wheezing or rales.   Chest:      Chest wall: No tenderness.   Abdominal:      General: Abdomen is flat. Bowel sounds are normal. There is no distension.      Palpations: There is no mass.      Tenderness: There is no abdominal tenderness. There is no guarding.   Genitourinary:     Rectum: Normal.   Musculoskeletal:         General: Deformity present. No tenderness. Normal range of motion.      Cervical back: Normal range of motion and neck supple. No tenderness.      Right lower leg: No edema.      Comments: Left BKA   Skin:     General: Skin is warm and dry.      Coloration: Skin is not jaundiced.      Findings: No bruising or erythema.   Neurological:      General: No focal deficit present.      Mental Status: She is alert and oriented to person, place, and time. Mental status is at baseline.      Cranial Nerves: No " cranial nerve deficit.      Sensory: No sensory deficit.      Coordination: Coordination normal.      Gait: Gait normal.   Psychiatric:         Mood and Affect: Mood normal.         Thought Content: Thought content normal.         Judgment: Judgment normal.         Assessment/Plan   Problem List Items Addressed This Visit          Nervous    Congenital anomaly of spinal cord (CMS/HCC)    Relevant Medications    gabapentin (Neurontin) 600 mg tablet       Circulatory    Benign essential hypertension    Relevant Medications    losartan (Cozaar) 25 mg tablet       Musculoskeletal    Gouty arthritis of right great toe    Relevant Orders    Uric Acid    Sedimentation Rate       Endocrine/Metabolic    DM type 2 with diabetic dyslipidemia (CMS/[facility])    Relevant Medications    blood sugar diagnostic (Blood Glucose Test) strip    Other Relevant Orders    Hemoglobin A1C    Hypothyroidism    Relevant Medications    thyroid, pork, ([place]) 30 mg tablet    Other Relevant Orders    Thyroid Stimulating Hormone    Vitamin D deficiency    Relevant Medications    ergocalciferol (Vitamin D-2) 1.25 MG (46862 UT) capsule       Other    Depression    Relevant Medications    sertraline (Zoloft) 50 mg tablet    Hyperlipidemia    Relevant Medications    simvastatin (Zocor) 10 mg tablet    Other Relevant Orders    Comprehensive Metabolic Panel    Lipid Panel    Status post below-knee amputation of left lower extremity (CMS/[facility])    Relevant Orders    Disability Placard     Other Visit Diagnoses       Encounter for Medicare annual wellness exam    -  Primary    Cold sore        Relevant Medications    valACYclovir (Valtrex) 1 gram tablet    ACP (advance care planning)                  Scribe Attestation  By signing my name below, IBarbra RMA , Scribe   attest that this documentation has been prepared under the direction and in the presence of Cm Hanley DO.   Provider Attestation - Scribe documentation    All medical  record entries made by the Scribe were at my direction and personally dictated by me. I have reviewed the chart and agree that the record accurately reflects my personal performance of the history, physical exam, discussion and plan.

## 2023-05-04 ENCOUNTER — PATIENT MESSAGE (OUTPATIENT)
Dept: PRIMARY CARE | Facility: CLINIC | Age: 68
End: 2023-05-04
Payer: MEDICARE

## 2023-05-04 DIAGNOSIS — I10 BENIGN ESSENTIAL HYPERTENSION: ICD-10-CM

## 2023-05-05 ENCOUNTER — LAB (OUTPATIENT)
Dept: LAB | Facility: LAB | Age: 68
End: 2023-05-05
Payer: MEDICARE

## 2023-05-05 DIAGNOSIS — E03.9 ACQUIRED HYPOTHYROIDISM: ICD-10-CM

## 2023-05-05 DIAGNOSIS — E78.5 DM TYPE 2 WITH DIABETIC DYSLIPIDEMIA (MULTI): ICD-10-CM

## 2023-05-05 DIAGNOSIS — M10.9 GOUTY ARTHRITIS OF RIGHT GREAT TOE: ICD-10-CM

## 2023-05-05 DIAGNOSIS — E11.69 DM TYPE 2 WITH DIABETIC DYSLIPIDEMIA (MULTI): ICD-10-CM

## 2023-05-05 DIAGNOSIS — E78.2 MIXED HYPERLIPIDEMIA: ICD-10-CM

## 2023-05-05 LAB
ALANINE AMINOTRANSFERASE (SGPT) (U/L) IN SER/PLAS: 13 U/L (ref 7–45)
ALBUMIN (G/DL) IN SER/PLAS: 4 G/DL (ref 3.4–5)
ALKALINE PHOSPHATASE (U/L) IN SER/PLAS: 72 U/L (ref 33–136)
ANION GAP IN SER/PLAS: 12 MMOL/L (ref 10–20)
ASPARTATE AMINOTRANSFERASE (SGOT) (U/L) IN SER/PLAS: 16 U/L (ref 9–39)
BILIRUBIN TOTAL (MG/DL) IN SER/PLAS: 0.5 MG/DL (ref 0–1.2)
CALCIUM (MG/DL) IN SER/PLAS: 9.5 MG/DL (ref 8.6–10.3)
CARBON DIOXIDE, TOTAL (MMOL/L) IN SER/PLAS: 29 MMOL/L (ref 21–32)
CHLORIDE (MMOL/L) IN SER/PLAS: 104 MMOL/L (ref 98–107)
CHOLESTEROL (MG/DL) IN SER/PLAS: 201 MG/DL (ref 0–199)
CHOLESTEROL IN HDL (MG/DL) IN SER/PLAS: 44.4 MG/DL
CHOLESTEROL IN LDL (MG/DL) IN SER/PLAS BY DIRECT ASSAY: 106 MG/DL (ref 0–129)
CHOLESTEROL/HDL RATIO: 4.5
CREATININE (MG/DL) IN SER/PLAS: 1.17 MG/DL (ref 0.5–1.05)
ESTIMATED AVERAGE GLUCOSE FOR HBA1C: 137 MG/DL
GFR FEMALE: 51 ML/MIN/1.73M2
GLUCOSE (MG/DL) IN SER/PLAS: 126 MG/DL (ref 74–99)
HEMOGLOBIN A1C/HEMOGLOBIN TOTAL IN BLOOD: 6.4 %
LDL: ABNORMAL MG/DL (ref 0–99)
NON HDL CHOLESTEROL: 157 MG/DL
POTASSIUM (MMOL/L) IN SER/PLAS: 4.9 MMOL/L (ref 3.5–5.3)
PROTEIN TOTAL: 6.8 G/DL (ref 6.4–8.2)
SEDIMENTATION RATE, ERYTHROCYTE: 9 MM/H (ref 0–30)
SODIUM (MMOL/L) IN SER/PLAS: 140 MMOL/L (ref 136–145)
THYROTROPIN (MIU/L) IN SER/PLAS BY DETECTION LIMIT <= 0.05 MIU/L: 1.74 MIU/L (ref 0.44–3.98)
TRIGLYCERIDE (MG/DL) IN SER/PLAS: 452 MG/DL (ref 0–149)
URATE (MG/DL) IN SER/PLAS: 6.9 MG/DL (ref 2.3–6.7)
UREA NITROGEN (MG/DL) IN SER/PLAS: 25 MG/DL (ref 6–23)
VLDL: ABNORMAL MG/DL (ref 0–40)

## 2023-05-05 PROCEDURE — 83036 HEMOGLOBIN GLYCOSYLATED A1C: CPT

## 2023-05-05 PROCEDURE — 84443 ASSAY THYROID STIM HORMONE: CPT

## 2023-05-05 PROCEDURE — 80053 COMPREHEN METABOLIC PANEL: CPT

## 2023-05-05 PROCEDURE — 85652 RBC SED RATE AUTOMATED: CPT

## 2023-05-05 PROCEDURE — 36415 COLL VENOUS BLD VENIPUNCTURE: CPT

## 2023-05-05 PROCEDURE — 80061 LIPID PANEL: CPT

## 2023-05-05 PROCEDURE — 83721 ASSAY OF BLOOD LIPOPROTEIN: CPT

## 2023-05-05 PROCEDURE — 84550 ASSAY OF BLOOD/URIC ACID: CPT

## 2023-05-05 RX ORDER — METOPROLOL SUCCINATE 25 MG/1
25 TABLET, EXTENDED RELEASE ORAL DAILY
COMMUNITY
End: 2023-05-05 | Stop reason: SDUPTHER

## 2023-05-05 RX ORDER — METOPROLOL SUCCINATE 25 MG/1
25 TABLET, EXTENDED RELEASE ORAL DAILY
Qty: 90 TABLET | Refills: 1 | Status: SHIPPED | OUTPATIENT
Start: 2023-05-05 | End: 2023-10-16

## 2023-05-05 NOTE — TELEPHONE ENCOUNTER
From: Alejandra Chowdhury  To: Cm Hanley DO  Sent: 5/4/2023 7:18 PM EDT  Subject: Medication     I am not seeing my blood pressure medication on my medication refill list. I am out of it. Could you please send a refill to the pharmacy. Metotropolol succinate is what I have been taking. Thank you Benoit

## 2023-08-17 PROBLEM — M15.9 GENERALIZED OSTEOARTHROSIS: Status: ACTIVE | Noted: 2023-08-17

## 2023-08-17 RX ORDER — ELETRIPTAN HYDROBROMIDE 40 MG/1
40 TABLET, FILM COATED ORAL AS NEEDED
COMMUNITY
End: 2023-10-02 | Stop reason: ALTCHOICE

## 2023-08-17 RX ORDER — ANASTROZOLE 1 MG/1
1 TABLET ORAL DAILY
COMMUNITY
Start: 2023-04-12 | End: 2023-10-02 | Stop reason: ALTCHOICE

## 2023-08-17 RX ORDER — OXYCODONE AND ACETAMINOPHEN 5; 325 MG/1; MG/1
1 TABLET ORAL EVERY 6 HOURS PRN
COMMUNITY
Start: 2023-03-22 | End: 2023-10-02 | Stop reason: ALTCHOICE

## 2023-08-17 RX ORDER — TRIAMTERENE AND HYDROCHLOROTHIAZIDE 75; 50 MG/1; MG/1
1 TABLET ORAL
COMMUNITY
End: 2023-10-02 | Stop reason: ALTCHOICE

## 2023-09-05 LAB
ALBUMIN (G/DL) IN SER/PLAS: 3.8 G/DL (ref 3.4–5)
ALBUMIN (MG/L) IN URINE: 56.2 MG/L
ALBUMIN/CREATININE (UG/MG) IN URINE: 51.1 UG/MG CRT (ref 0–30)
ANION GAP IN SER/PLAS: 15 MMOL/L (ref 10–20)
CALCIDIOL (25 OH VITAMIN D3) (NG/ML) IN SER/PLAS: 48 NG/ML
CALCIUM (MG/DL) IN SER/PLAS: 8.9 MG/DL (ref 8.6–10.3)
CARBON DIOXIDE, TOTAL (MMOL/L) IN SER/PLAS: 21 MMOL/L (ref 21–32)
CHLORIDE (MMOL/L) IN SER/PLAS: 109 MMOL/L (ref 98–107)
CREATININE (MG/DL) IN SER/PLAS: 1.71 MG/DL (ref 0.5–1.05)
CREATININE (MG/DL) IN URINE: 110 MG/DL (ref 20–320)
ERYTHROCYTE DISTRIBUTION WIDTH (RATIO) BY AUTOMATED COUNT: 13.7 % (ref 11.5–14.5)
ERYTHROCYTE MEAN CORPUSCULAR HEMOGLOBIN CONCENTRATION (G/DL) BY AUTOMATED: 32.7 G/DL (ref 32–36)
ERYTHROCYTE MEAN CORPUSCULAR VOLUME (FL) BY AUTOMATED COUNT: 96 FL (ref 80–100)
ERYTHROCYTES (10*6/UL) IN BLOOD BY AUTOMATED COUNT: 3.94 X10E12/L (ref 4–5.2)
ESTIMATED AVERAGE GLUCOSE FOR HBA1C: 131 MG/DL
GFR FEMALE: 32 ML/MIN/1.73M2
GLUCOSE (MG/DL) IN SER/PLAS: 132 MG/DL (ref 74–99)
HEMATOCRIT (%) IN BLOOD BY AUTOMATED COUNT: 37.9 % (ref 36–46)
HEMOGLOBIN (G/DL) IN BLOOD: 12.4 G/DL (ref 12–16)
HEMOGLOBIN A1C/HEMOGLOBIN TOTAL IN BLOOD: 6.2 %
LEUKOCYTES (10*3/UL) IN BLOOD BY AUTOMATED COUNT: 6.9 X10E9/L (ref 4.4–11.3)
PARATHYRIN INTACT (PG/ML) IN SER/PLAS: 54.9 PG/ML (ref 18.5–88)
PHOSPHATE (MG/DL) IN SER/PLAS: 3.8 MG/DL (ref 2.5–4.9)
PLATELETS (10*3/UL) IN BLOOD AUTOMATED COUNT: 215 X10E9/L (ref 150–450)
POTASSIUM (MMOL/L) IN SER/PLAS: 4.6 MMOL/L (ref 3.5–5.3)
SODIUM (MMOL/L) IN SER/PLAS: 140 MMOL/L (ref 136–145)
THYROTROPIN (MIU/L) IN SER/PLAS BY DETECTION LIMIT <= 0.05 MIU/L: 1.27 MIU/L (ref 0.44–3.98)
UREA NITROGEN (MG/DL) IN SER/PLAS: 44 MG/DL (ref 6–23)

## 2023-09-11 LAB
APPEARANCE, URINE: CLEAR
BACTERIA, URINE: ABNORMAL /HPF
BILIRUBIN, URINE: NEGATIVE
BLOOD, URINE: NEGATIVE
COLOR, URINE: YELLOW
GLUCOSE, URINE: NEGATIVE MG/DL
HYALINE CASTS, URINE: ABNORMAL /LPF
KETONES, URINE: NEGATIVE MG/DL
LEUKOCYTE ESTERASE, URINE: ABNORMAL
MUCUS, URINE: ABNORMAL /LPF
NITRITE, URINE: NEGATIVE
PH, URINE: 5 (ref 5–8)
PROTEIN, URINE: NEGATIVE MG/DL
RBC, URINE: 1 /HPF (ref 0–5)
SPECIFIC GRAVITY, URINE: 1.02 (ref 1–1.03)
SQUAMOUS EPITHELIAL CELLS, URINE: <1 /HPF
UROBILINOGEN, URINE: <2 MG/DL (ref 0–1.9)
WBC, URINE: 7 /HPF (ref 0–5)

## 2023-09-12 LAB — URINE CULTURE: NORMAL

## 2023-09-13 PROBLEM — I87.2 CHRONIC VENOUS INSUFFICIENCY: Status: ACTIVE | Noted: 2022-06-01

## 2023-09-13 PROBLEM — R21 RASH AND OTHER NONSPECIFIC SKIN ERUPTION: Status: ACTIVE | Noted: 2022-06-01

## 2023-09-13 PROBLEM — N63.10 MASS OF RIGHT BREAST: Status: ACTIVE | Noted: 2023-09-13

## 2023-09-13 PROBLEM — R50.9 FEBRILE: Status: ACTIVE | Noted: 2023-09-13

## 2023-09-13 PROBLEM — L23.9 ECZEMA, ALLERGIC: Status: ACTIVE | Noted: 2023-09-13

## 2023-09-13 RX ORDER — TRIAMCINOLONE ACETONIDE 1 MG/G
CREAM TOPICAL
COMMUNITY
Start: 2021-04-05 | End: 2024-02-13 | Stop reason: ALTCHOICE

## 2023-09-13 RX ORDER — FLUOCINOLONE ACETONIDE, HYDROQUINONE, AND TRETINOIN .1; 40; .5 MG/G; MG/G; MG/G
CREAM TOPICAL
COMMUNITY
Start: 2021-06-07

## 2023-10-02 ENCOUNTER — OFFICE VISIT (OUTPATIENT)
Dept: PRIMARY CARE | Facility: CLINIC | Age: 68
End: 2023-10-02
Payer: MEDICARE

## 2023-10-02 VITALS
HEIGHT: 62 IN | DIASTOLIC BLOOD PRESSURE: 68 MMHG | RESPIRATION RATE: 16 BRPM | WEIGHT: 153 LBS | TEMPERATURE: 97.9 F | OXYGEN SATURATION: 99 % | SYSTOLIC BLOOD PRESSURE: 106 MMHG | HEART RATE: 66 BPM | BODY MASS INDEX: 28.16 KG/M2

## 2023-10-02 DIAGNOSIS — E11.69 DM TYPE 2 WITH DIABETIC DYSLIPIDEMIA (MULTI): Primary | ICD-10-CM

## 2023-10-02 DIAGNOSIS — R20.8 BURNING SENSATION: ICD-10-CM

## 2023-10-02 DIAGNOSIS — E78.2 MIXED HYPERLIPIDEMIA: ICD-10-CM

## 2023-10-02 DIAGNOSIS — Z79.899 MEDICATION MANAGEMENT: ICD-10-CM

## 2023-10-02 DIAGNOSIS — N18.31 STAGE 3A CHRONIC KIDNEY DISEASE (MULTI): ICD-10-CM

## 2023-10-02 DIAGNOSIS — G47.00 INSOMNIA, UNSPECIFIED TYPE: ICD-10-CM

## 2023-10-02 DIAGNOSIS — I10 BENIGN ESSENTIAL HYPERTENSION: ICD-10-CM

## 2023-10-02 DIAGNOSIS — E03.9 ACQUIRED HYPOTHYROIDISM: ICD-10-CM

## 2023-10-02 DIAGNOSIS — L30.9 DERMATITIS: ICD-10-CM

## 2023-10-02 DIAGNOSIS — R82.90 ABNORMAL URINALYSIS: ICD-10-CM

## 2023-10-02 DIAGNOSIS — Q06.9 CONGENITAL ANOMALY OF SPINAL CORD (MULTI): ICD-10-CM

## 2023-10-02 DIAGNOSIS — R30.0 DYSURIA: ICD-10-CM

## 2023-10-02 DIAGNOSIS — R42 DIZZINESS: ICD-10-CM

## 2023-10-02 DIAGNOSIS — C50.919 INVASIVE LOBULAR CARCINOMA OF BREAST IN FEMALE (MULTI): ICD-10-CM

## 2023-10-02 DIAGNOSIS — E78.5 DM TYPE 2 WITH DIABETIC DYSLIPIDEMIA (MULTI): Primary | ICD-10-CM

## 2023-10-02 LAB
POC APPEARANCE, URINE: CLEAR
POC BILIRUBIN, URINE: NEGATIVE
POC BLOOD, URINE: NEGATIVE
POC COLOR, URINE: YELLOW
POC GLUCOSE, URINE: NEGATIVE MG/DL
POC KETONES, URINE: NEGATIVE MG/DL
POC LEUKOCYTES, URINE: ABNORMAL
POC NITRITE,URINE: NEGATIVE
POC PH, URINE: 5.5 PH
POC PROTEIN, URINE: NEGATIVE MG/DL
POC SPECIFIC GRAVITY, URINE: 1.02
POC UROBILINOGEN, URINE: 0.2 EU/DL

## 2023-10-02 PROCEDURE — 90662 IIV NO PRSV INCREASED AG IM: CPT | Performed by: FAMILY MEDICINE

## 2023-10-02 PROCEDURE — 1160F RVW MEDS BY RX/DR IN RCRD: CPT | Performed by: FAMILY MEDICINE

## 2023-10-02 PROCEDURE — 99214 OFFICE O/P EST MOD 30 MIN: CPT | Performed by: FAMILY MEDICINE

## 2023-10-02 PROCEDURE — 81002 URINALYSIS NONAUTO W/O SCOPE: CPT | Performed by: FAMILY MEDICINE

## 2023-10-02 PROCEDURE — 3044F HG A1C LEVEL LT 7.0%: CPT | Performed by: FAMILY MEDICINE

## 2023-10-02 PROCEDURE — G0008 ADMIN INFLUENZA VIRUS VAC: HCPCS | Performed by: FAMILY MEDICINE

## 2023-10-02 PROCEDURE — 3074F SYST BP LT 130 MM HG: CPT | Performed by: FAMILY MEDICINE

## 2023-10-02 PROCEDURE — 4010F ACE/ARB THERAPY RXD/TAKEN: CPT | Performed by: FAMILY MEDICINE

## 2023-10-02 PROCEDURE — 1159F MED LIST DOCD IN RCRD: CPT | Performed by: FAMILY MEDICINE

## 2023-10-02 PROCEDURE — 3078F DIAST BP <80 MM HG: CPT | Performed by: FAMILY MEDICINE

## 2023-10-02 PROCEDURE — 1036F TOBACCO NON-USER: CPT | Performed by: FAMILY MEDICINE

## 2023-10-02 PROCEDURE — 90677 PCV20 VACCINE IM: CPT | Performed by: FAMILY MEDICINE

## 2023-10-02 PROCEDURE — G0009 ADMIN PNEUMOCOCCAL VACCINE: HCPCS | Performed by: FAMILY MEDICINE

## 2023-10-02 RX ORDER — TRIAMCINOLONE ACETONIDE 1 MG/G
CREAM TOPICAL 2 TIMES DAILY
Qty: 15 G | Refills: 0 | Status: SHIPPED | OUTPATIENT
Start: 2023-10-02

## 2023-10-02 RX ORDER — ZOLPIDEM TARTRATE 5 MG/1
5 TABLET ORAL NIGHTLY PRN
Qty: 30 TABLET | Refills: 1 | Status: SHIPPED | OUTPATIENT
Start: 2023-10-02 | End: 2024-02-13 | Stop reason: SDUPTHER

## 2023-10-02 RX ORDER — PREDNISONE 10 MG/1
TABLET ORAL
Qty: 25 TABLET | Refills: 0 | Status: SHIPPED | OUTPATIENT
Start: 2023-10-02

## 2023-10-02 RX ORDER — GABAPENTIN 600 MG/1
600 TABLET ORAL 2 TIMES DAILY
Qty: 180 TABLET | Refills: 0 | Status: SHIPPED | OUTPATIENT
Start: 2023-10-02 | End: 2024-05-04

## 2023-10-02 ASSESSMENT — ENCOUNTER SYMPTOMS
BLOOD IN STOOL: 0
SEIZURES: 0
POLYPHAGIA: 0
COLOR CHANGE: 0
FLANK PAIN: 0
SINUS PRESSURE: 0
NERVOUS/ANXIOUS: 0
CONFUSION: 0
CONSTIPATION: 0
CONSTITUTIONAL NEGATIVE: 1
FEVER: 0
MYALGIAS: 0
ARTHRALGIAS: 0
POLYDIPSIA: 0
ABDOMINAL PAIN: 0
DIZZINESS: 0
ACTIVITY CHANGE: 0
ABDOMINAL DISTENTION: 0
DECREASED CONCENTRATION: 0
FATIGUE: 0
NECK STIFFNESS: 0
APPETITE CHANGE: 0
DYSURIA: 0
SORE THROAT: 0
ADENOPATHY: 0
PALPITATIONS: 0
SHORTNESS OF BREATH: 0
SPEECH DIFFICULTY: 0
RECTAL PAIN: 0
STRIDOR: 0
TROUBLE SWALLOWING: 0
HEMATURIA: 0
HEADACHES: 0
RHINORRHEA: 0
EYE PAIN: 0
COUGH: 0
SLEEP DISTURBANCE: 0
CHEST TIGHTNESS: 0
PHOTOPHOBIA: 0
SINUS PAIN: 0
DIARRHEA: 0
AGITATION: 0
DYSPHORIC MOOD: 0

## 2023-10-02 NOTE — LETTER
October 4, 2023     Benoit CARRERA Luciana  [address] [place] [id number]      Dear Ms. Chowdhury:    Below are the results from your recent visit:    LAB RESULTS WITHIN NORMAL LIMITS     Resulted Orders   POCT UA (nonautomated) manually resulted   Result Value Ref Range    POC Color, Urine Yellow Straw, Yellow, Light-Yellow    POC Appearance, Urine Clear Clear    POC Specific Gravity, Urine 1.020 1.005 - 1.035    POC PH, Urine 5.5 No Reference Range Established PH    POC Protein, Urine NEGATIVE NEGATIVE, 30 (1+) mg/dl    POC Glucose, Urine NEGATIVE NEGATIVE mg/dl    POC Blood, Urine NEGATIVE NEGATIVE    POC Ketones, Urine NEGATIVE NEGATIVE mg/dl    POC Bilirubin, Urine NEGATIVE NEGATIVE    POC Urobilinogen, Urine 0.2 0.2, 1.0 EU/DL    Poc Nitrate, Urine NEGATIVE NEGATIVE    POC Leukocytes, Urine TRACE (A) NEGATIVE   Urine Culture   Result Value Ref Range    Urine Culture Normal genitourinary liam      The test results show that your current treatment is working. Please continue your current medication and plan.   If you have any questions or concerns, please don't hesitate to call.         Sincerely,        Cm Hanley, DO

## 2023-10-02 NOTE — PATIENT INSTRUCTIONS
Follow up in 3 months    Continue current medications and therapy for chronic medical conditions.    Patient was advised importance of proper diet/nutrition in addition adequate hydration. Patient was encouraged moderate exercise program to include 30 minutes daily for 5 days of the week or 150 minutes weekly. Patient will follow-up with us as scheduled.    I personally reviewed the OARRS report for this patient. I have considered the risks of abuse, dependence, addiction, and diversion.    UDS/CSA: DUE    IO UA TODAY     PREVNAR 20 TODAY     HIGH DOSE INFLUENZA TODAY     OBTAIN CT OF HEAD     START PREDNISONE TAPERING DOSE

## 2023-10-03 ENCOUNTER — ANCILLARY PROCEDURE (OUTPATIENT)
Dept: RADIOLOGY | Facility: CLINIC | Age: 68
End: 2023-10-03
Payer: MEDICARE

## 2023-10-03 DIAGNOSIS — N18.31 CHRONIC KIDNEY DISEASE, STAGE 3A (MULTI): ICD-10-CM

## 2023-10-03 LAB — BACTERIA UR CULT: NORMAL

## 2023-10-03 PROCEDURE — 76770 US EXAM ABDO BACK WALL COMP: CPT

## 2023-10-03 PROCEDURE — 76770 US EXAM ABDO BACK WALL COMP: CPT | Performed by: RADIOLOGY

## 2023-10-05 LAB
1OH-MIDAZOLAM UR CFM-MCNC: <25 NG/ML
6MAM UR CFM-MCNC: <25 NG/ML
7AMINOCLONAZEPAM UR CFM-MCNC: <25 NG/ML
A-OH ALPRAZ UR CFM-MCNC: <25 NG/ML
ALPRAZ UR CFM-MCNC: <25 NG/ML
CHLORDIAZEP UR CFM-MCNC: <25 NG/ML
CLONAZEPAM UR CFM-MCNC: <25 NG/ML
CODEINE UR CFM-MCNC: <50 NG/ML
DIAZEPAM UR CFM-MCNC: <25 NG/ML
EDDP UR CFM-MCNC: <25 NG/ML
FENTANYL UR CFM-MCNC: <2.5 NG/ML
HYDROCODONE CTO UR CFM-MCNC: <25 NG/ML
HYDROMORPHONE UR CFM-MCNC: <25 NG/ML
LORAZEPAM UR CFM-MCNC: <25 NG/ML
METHADONE UR CFM-MCNC: <25 NG/ML
MIDAZOLAM UR CFM-MCNC: <25 NG/ML
MORPHINE UR CFM-MCNC: <50 NG/ML
NORDIAZEPAM UR CFM-MCNC: <25 NG/ML
NORFENTANYL UR CFM-MCNC: <2.5 NG/ML
NORHYDROCODONE UR CFM-MCNC: <25 NG/ML
NOROXYCODONE UR CFM-MCNC: <25 NG/ML
NORTRAMADOL UR-MCNC: <50 NG/ML
OXAZEPAM UR CFM-MCNC: <25 NG/ML
OXYCODONE UR CFM-MCNC: <25 NG/ML
OXYMORPHONE UR CFM-MCNC: <25 NG/ML
TEMAZEPAM UR CFM-MCNC: <25 NG/ML
TRAMADOL UR CFM-MCNC: <50 NG/ML
ZOLPIDEM UR CFM-MCNC: >1000 NG/ML
ZOLPIDEM UR-MCNC: <25 NG/ML

## 2023-10-10 DIAGNOSIS — C50.919 MALIGNANT NEOPLASM OF FEMALE BREAST, UNSPECIFIED ESTROGEN RECEPTOR STATUS, UNSPECIFIED LATERALITY, UNSPECIFIED SITE OF BREAST (MULTI): Primary | ICD-10-CM

## 2023-10-10 DIAGNOSIS — F32.9 MAJOR DEPRESSIVE DISORDER, REMISSION STATUS UNSPECIFIED, UNSPECIFIED WHETHER RECURRENT: ICD-10-CM

## 2023-10-10 RX ORDER — SERTRALINE HYDROCHLORIDE 50 MG/1
50 TABLET, FILM COATED ORAL DAILY
Qty: 90 TABLET | Refills: 1 | Status: SHIPPED | OUTPATIENT
Start: 2023-10-10 | End: 2024-04-01

## 2023-10-11 DIAGNOSIS — C50.919 INVASIVE LOBULAR CARCINOMA OF BREAST IN FEMALE (MULTI): Primary | ICD-10-CM

## 2023-10-13 ENCOUNTER — ANCILLARY PROCEDURE (OUTPATIENT)
Dept: RADIOLOGY | Facility: CLINIC | Age: 68
End: 2023-10-13
Payer: MEDICARE

## 2023-10-13 ENCOUNTER — OFFICE VISIT (OUTPATIENT)
Dept: ORTHOPEDIC SURGERY | Facility: CLINIC | Age: 68
End: 2023-10-13
Payer: MEDICARE

## 2023-10-13 VITALS — WEIGHT: 153 LBS | HEIGHT: 62 IN | BODY MASS INDEX: 28.16 KG/M2

## 2023-10-13 DIAGNOSIS — M79.642 LEFT HAND PAIN: ICD-10-CM

## 2023-10-13 DIAGNOSIS — S63.502A WRIST SPRAIN, LEFT, INITIAL ENCOUNTER: ICD-10-CM

## 2023-10-13 DIAGNOSIS — M79.642 LEFT HAND PAIN: Primary | ICD-10-CM

## 2023-10-13 PROCEDURE — 99213 OFFICE O/P EST LOW 20 MIN: CPT | Mod: PO,25 | Performed by: STUDENT IN AN ORGANIZED HEALTH CARE EDUCATION/TRAINING PROGRAM

## 2023-10-13 PROCEDURE — 4010F ACE/ARB THERAPY RXD/TAKEN: CPT | Performed by: STUDENT IN AN ORGANIZED HEALTH CARE EDUCATION/TRAINING PROGRAM

## 2023-10-13 PROCEDURE — 1160F RVW MEDS BY RX/DR IN RCRD: CPT | Performed by: STUDENT IN AN ORGANIZED HEALTH CARE EDUCATION/TRAINING PROGRAM

## 2023-10-13 PROCEDURE — 1159F MED LIST DOCD IN RCRD: CPT | Performed by: STUDENT IN AN ORGANIZED HEALTH CARE EDUCATION/TRAINING PROGRAM

## 2023-10-13 PROCEDURE — 99213 OFFICE O/P EST LOW 20 MIN: CPT | Performed by: STUDENT IN AN ORGANIZED HEALTH CARE EDUCATION/TRAINING PROGRAM

## 2023-10-13 PROCEDURE — L3908 WHO COCK-UP NONMOLDE PRE OTS: HCPCS | Performed by: STUDENT IN AN ORGANIZED HEALTH CARE EDUCATION/TRAINING PROGRAM

## 2023-10-13 PROCEDURE — 73110 X-RAY EXAM OF WRIST: CPT | Mod: LEFT SIDE | Performed by: STUDENT IN AN ORGANIZED HEALTH CARE EDUCATION/TRAINING PROGRAM

## 2023-10-13 PROCEDURE — 3044F HG A1C LEVEL LT 7.0%: CPT | Performed by: STUDENT IN AN ORGANIZED HEALTH CARE EDUCATION/TRAINING PROGRAM

## 2023-10-13 PROCEDURE — 1036F TOBACCO NON-USER: CPT | Performed by: STUDENT IN AN ORGANIZED HEALTH CARE EDUCATION/TRAINING PROGRAM

## 2023-10-13 PROCEDURE — 73130 X-RAY EXAM OF HAND: CPT | Mod: LT,FY

## 2023-10-13 NOTE — PROGRESS NOTES
History of Present Illness:  Benoit presents for evaluation of left hand and wrist pain.  She tripped and fell and hyperflexed her wrist on 10/12/2023.  Has had significant pain over her midcarpal joint since this event.    Review of Systems   GENERAL: Negative for malaise, significant weight loss, fever  MUSCULOSKELETAL: see HPI  NEURO:  Negative    The patient's past medical history, family history, social history, and review of systems were reviewed. History is otherwise negative except as stated in the HPI.    Physical Examination:  The patient appears to be their stated age, is in no apparent distress, and is oriented x3. The patients mood and affect are appropriate. The patients gait is normal. The examination of the limb in question was performed in comparison to the contralateral limb.    On musculoskeletal examination,   Tenderness to palpation over the midcarpal joint and capitate.  Mild swelling over the midcarpal joint.  She also has some swelling over the first dorsal compartment.  She is able to make a full composite fist.  Able to extend all of her digits against resistance.  Sensation tact light touch throughout the hand.  Hand is warm well perfused    Imaging:  Radiographic notes: AP lateral and oblique of the left wrist and hand reviewed and showed no acute fracture.  No significant soft tissue swelling.  Minimal arthritis.    Assessment:  Patient with left wrist sprain    Plan:    Splint provided today.  She will wear this over the next 4 weeks.  Plan for follow-up in 4 weeks for repeat clinical evaluation.  If she has continued pain we will discuss further imaging or cortisone injection.      Follow up: 4 weeks    Andria Saenz MD

## 2023-10-15 DIAGNOSIS — I10 BENIGN ESSENTIAL HYPERTENSION: ICD-10-CM

## 2023-10-16 ENCOUNTER — HOSPITAL ENCOUNTER (OUTPATIENT)
Dept: LAB | Age: 68
Discharge: HOME | End: 2023-10-16
Payer: MEDICARE

## 2023-10-16 LAB
ALBUMIN SERPL-MCNC: 4.1 G/DL (ref 3.5–4.6)
ANION GAP SERPL CALCULATED.3IONS-SCNC: 9 MEQ/L (ref 9–15)
BACTERIA URNS QL MICRO: NEGATIVE /HPF
BILIRUB UR QL STRIP: NEGATIVE
BUN SERPL-MCNC: 30 MG/DL (ref 8–23)
CALCIUM SERPL-MCNC: 9.2 MG/DL (ref 8.5–9.9)
CHLORIDE SERPL-SCNC: 102 MEQ/L (ref 95–107)
CLARITY UR: CLEAR
CO2 SERPL-SCNC: 27 MEQ/L (ref 20–31)
COLOR UR: YELLOW
CREAT SERPL-MCNC: 1.28 MG/DL (ref 0.5–0.9)
CREAT UR-MCNC: 104.4 MG/DL
EPI CELLS #/AREA URNS AUTO: NORMAL /HPF (ref 0–5)
ERYTHROCYTE [DISTWIDTH] IN BLOOD BY AUTOMATED COUNT: 13.1 % (ref 11.5–14.5)
GLUCOSE SERPL-MCNC: 118 MG/DL (ref 70–99)
GLUCOSE UR STRIP-MCNC: NEGATIVE MG/DL
HCT VFR BLD AUTO: 42.1 % (ref 37–47)
HGB BLD-MCNC: 13.8 G/DL (ref 12–16)
HGB UR QL STRIP: NEGATIVE
HYALINE CASTS #/AREA URNS AUTO: NORMAL /HPF (ref 0–5)
KETONES UR STRIP-MCNC: NEGATIVE MG/DL
LEUKOCYTE ESTERASE UR QL STRIP: ABNORMAL
MCH RBC QN AUTO: 31.5 PG (ref 27–31.3)
MCHC RBC AUTO-ENTMCNC: 32.8 % (ref 33–37)
MCV RBC AUTO: 96.1 FL (ref 79.4–94.8)
NITRITE UR QL STRIP: NEGATIVE
PH UR STRIP: 6 [PH] (ref 5–9)
PHOSPHATE SERPL-MCNC: 3.1 MG/DL (ref 2.3–4.8)
PLATELET # BLD AUTO: 236 K/UL (ref 130–400)
POTASSIUM SERPL-SCNC: 4.3 MEQ/L (ref 3.4–4.9)
PROT UR STRIP-MCNC: NEGATIVE MG/DL
PROT UR-MCNC: 10 MG/DL
PROT/CREAT UR-RTO: 0.1 ML/ML
PROT/CREAT UR-RTO: 0.1 ML/ML (ref 0–0.2)
RBC # BLD AUTO: 4.38 M/UL (ref 4.2–5.4)
RBC #/AREA URNS AUTO: NORMAL /HPF (ref 0–5)
SODIUM SERPL-SCNC: 138 MEQ/L (ref 135–144)
SP GR UR STRIP: 1.01 (ref 1–1.03)
UROBILINOGEN UR STRIP-ACNC: 0.2 E.U./DL
WBC # BLD AUTO: 8.6 K/UL (ref 4.8–10.8)
WBC #/AREA URNS AUTO: NORMAL /HPF (ref 0–5)

## 2023-10-16 PROCEDURE — 81001 URINALYSIS AUTO W/SCOPE: CPT

## 2023-10-16 PROCEDURE — 36415 COLL VENOUS BLD VENIPUNCTURE: CPT

## 2023-10-16 PROCEDURE — 84156 ASSAY OF PROTEIN URINE: CPT

## 2023-10-16 PROCEDURE — 80069 RENAL FUNCTION PANEL: CPT

## 2023-10-16 PROCEDURE — 85027 COMPLETE CBC AUTOMATED: CPT

## 2023-10-16 RX ORDER — METOPROLOL SUCCINATE 25 MG/1
25 TABLET, EXTENDED RELEASE ORAL DAILY
Qty: 90 TABLET | Refills: 1 | Status: SHIPPED | OUTPATIENT
Start: 2023-10-16 | End: 2024-04-01

## 2023-10-17 ENCOUNTER — APPOINTMENT (OUTPATIENT)
Dept: HEMATOLOGY/ONCOLOGY | Facility: CLINIC | Age: 68
End: 2023-10-17
Payer: MEDICARE

## 2023-10-19 ENCOUNTER — ANCILLARY PROCEDURE (OUTPATIENT)
Dept: RADIOLOGY | Facility: CLINIC | Age: 68
End: 2023-10-19
Payer: MEDICARE

## 2023-10-19 DIAGNOSIS — R42 DIZZINESS: ICD-10-CM

## 2023-10-19 DIAGNOSIS — E78.2 MIXED HYPERLIPIDEMIA: ICD-10-CM

## 2023-10-19 DIAGNOSIS — E03.9 ACQUIRED HYPOTHYROIDISM: ICD-10-CM

## 2023-10-19 PROCEDURE — 70450 CT HEAD/BRAIN W/O DYE: CPT | Mod: ME

## 2023-10-19 PROCEDURE — 70450 CT HEAD/BRAIN W/O DYE: CPT | Performed by: RADIOLOGY

## 2023-10-19 RX ORDER — SIMVASTATIN 10 MG/1
10 TABLET, FILM COATED ORAL NIGHTLY
Qty: 90 TABLET | Refills: 1 | Status: SHIPPED | OUTPATIENT
Start: 2023-10-19 | End: 2024-04-09

## 2023-10-19 RX ORDER — LEVOTHYROXINE, LIOTHYRONINE 19; 4.5 UG/1; UG/1
30 TABLET ORAL DAILY
Qty: 90 TABLET | Refills: 1 | Status: SHIPPED | OUTPATIENT
Start: 2023-10-19 | End: 2024-04-09

## 2023-10-23 PROBLEM — M85.80 OSTEOPENIA: Status: ACTIVE | Noted: 2023-10-23

## 2023-10-23 NOTE — PROGRESS NOTES
Patient ID: Benoit Chowdhury is a 68 y.o. female.    The patient presents to clinic today for her history of breast cancer.     Cancer Staging   Invasive lobular carcinoma of breast in female (CMS/HCC)  Staging form: Breast, AJCC 8th Edition  - Clinical stage from 3/22/2023: Stage IA (cT1c, cN0, cM0, G2, ER+, MI+, HER2-) - Unsigned        Diagnostic/Therapeutic History:  - On 1/18/2023 she had screening mammogram that showed a spiculated mass in the upper outer right breast, bilateral benign similar-appearing circumscribed and obscured  masses are noted.  No suspicious masses or calcification of the left breast.  BI-RADS Category 0     - right breast rash in January 2023     -On 2/2/2023 she had targeted ultrasound of the right breast that showedat 10:00, 9 cm from the nipple a 0.7 x 0.5 x 0.8 cm irregular hypoechoic mass which corresponds to the mass seen on mammogram, 7 morphologically normal lymph nodes identified the  right axilla      -On 2/8/2023 she had right breast ultrasound-guided core biopsy that showed invasive lobular carcinoma, grade 2 ER 95%, MI 95%, HER2 negative 1+.  Patient also had lobular carcinoma in situ, MammaPrint index +0.624, low risk luminal type a     -On 3/22/2023, she underwent right partial mastectomy with sentinel lymph node biopsy (0/1) that showed invasive lobular carcinoma 12 mm in greatest dimension, grade 2 with negative margins staged as a PT1CN0 ER 95%, MI 95%, HER2 negative 1+, did have  LCIS      - 5/1/23- 5/26/23: completed radiation     History of Present Illness (HPI)/Interval History:  Ms. Chowdhury presents for presents today for follow-up, treatment and surveillance. Accompanied by her .     She has not been off exemestane x a couple weeks as soon after she started this she had worsening kidney function. Per nephrologist had her hold this for a few weeks and kidney function improved. Likely a correlation to exemestane, and thus has remained off this. She did not tolerate  anastrozole in the past otherwise.     She denies any new breast cancer concerns.     She denies any chest pain or breathing issues.     She denies any vision changes, dizziness, loss of balance. Fell 2 weeks ago after losing her footing while districted/dark out, dx with bruised and strained tendon. Hx of migraines, manageable headaches.     She denies any new or unexplained bone aches or pains.    She denies any skin lesions or masses.    She reports a normal appetite and normal bowel movements.    She admits to sleep difficulties and taking Ambien (helpful) and she has some fatigue, along with dizziness, hx of Meniere's disease started back on anti-vertigo medications, restarted yesterday.  Hot flashes are still present though not as bad as when she was on anastrozole.     She has been on high dose vit d weekly for a number of years.     Review of Systems:  14-point ROS otherwise negative, as per HPI.    Past Medical History:   Diagnosis Date    Acute gastritis with bleeding 04/10/2021    Acute superficial gastritis with hemorrhage    Allergic contact dermatitis, unspecified cause 05/22/2022    Allergic dermatitis    Cutaneous abscess of groin 10/04/2021    Abscess of groin    Cutaneous abscess of left axilla 10/20/2021    Abscess of axilla, left    Disorder of kidney and ureter, unspecified 10/25/2021    Low kidney function    Effusion, right foot 03/18/2021    Swelling of first metatarsophalangeal (MTP) joint of right foot    Encounter for general adult medical examination without abnormal findings 02/08/2021    Encounter for Medicare annual wellness exam    Encounter for general adult medical examination without abnormal findings 05/18/2022    Encounter for Medicare annual wellness exam    Encounter for other screening for malignant neoplasm of breast     Breast cancer screening    Encounter for screening for malignant neoplasm of colon 09/23/2021    Encounter for colorectal cancer screening    Nonspecific  lymphadenitis, unspecified 10/20/2021    Axillary adenitis    Otitis media, unspecified, unspecified ear 2020    Ear infection    Pain in right toe(s) 2021    Pain of right great toe    Pain in unspecified joint 2020    Multiple joint pain    Personal history of diseases of the skin and subcutaneous tissue 10/20/2021    History of folliculitis    Personal history of diseases of the skin and subcutaneous tissue 2020    History of acute dermatitis    Personal history of diseases of the skin and subcutaneous tissue 2020    History of skin pruritus    Personal history of other drug therapy 2021    History of influenza vaccination    Personal history of other drug therapy 2020    History of influenza vaccination    Personal history of other endocrine, nutritional and metabolic disease 2021    History of hyperglycemia    Personal history of other specified conditions 2022    History of diarrhea    Personal history of other specified conditions 2022    History of vomiting    Rash and other nonspecific skin eruption 2020    Rash in adult    Rash and other nonspecific skin eruption 2021    Rash    Rash and other nonspecific skin eruption 2022    Rash       Past Surgical History:   Procedure Laterality Date    OTHER SURGICAL HISTORY  2020    Hysterectomy    OTHER SURGICAL HISTORY  2020     section    OTHER SURGICAL HISTORY  2020    Cholelithotomy    OTHER SURGICAL HISTORY  2020    Hernia repair    OTHER SURGICAL HISTORY  2020    Lower extremity amputation    OTHER SURGICAL HISTORY  2022    Ventral hernia repair    OTHER SURGICAL HISTORY  2022    Colonoscopy       Social History     Socioeconomic History    Marital status:      Spouse name: None    Number of children: None    Years of education: None    Highest education level: None   Occupational History    None   Tobacco Use    Smoking  status: Never     Passive exposure: Never    Smokeless tobacco: Never   Substance and Sexual Activity    Alcohol use: Not Currently    Drug use: Never    Sexual activity: None   Other Topics Concern    None   Social History Narrative    None     Social Determinants of Health     Financial Resource Strain: Not on file   Food Insecurity: Not on file   Transportation Needs: Not on file   Physical Activity: Not on file   Stress: Not on file   Social Connections: Not on file   Intimate Partner Violence: Not on file   Housing Stability: Not on file       Allergies   Allergen Reactions    Iodinated Contrast Media Unknown    Iodine Nausea Only and Rash         Current Outpatient Medications:     blood sugar diagnostic (Blood Glucose Test) strip, USE as DIRECTED, Disp: 100 strip, Rfl: 0    ergocalciferol (Vitamin D-2) 1.25 MG (42229 UT) capsule, Take 1 capsule (1,250 mcg) by mouth 1 (one) time per week., Disp: 12 capsule, Rfl: 1    gabapentin (Neurontin) 600 mg tablet, Take 1 tablet (600 mg) by mouth 2 times a day., Disp: 180 tablet, Rfl: 0    losartan (Cozaar) 25 mg tablet, Take 1 tablet (25 mg) by mouth once daily., Disp: 90 tablet, Rfl: 1    metoprolol succinate XL (Toprol-XL) 25 mg 24 hr tablet, take 1 tablet by mouth once daily DO NOT CRUSH OR CHEW, Disp: 90 tablet, Rfl: 1    NP Thyroid 30 mg tablet, take 1 tablet by mouth once daily, Disp: 90 tablet, Rfl: 1    sertraline (Zoloft) 50 mg tablet, take 1 tablet by mouth once daily, Disp: 90 tablet, Rfl: 1    simvastatin (Zocor) 10 mg tablet, take 1 tablet by mouth at bedtime, Disp: 90 tablet, Rfl: 1    zolpidem (Ambien) 5 mg tablet, Take 1 tablet (5 mg) by mouth as needed at bedtime for sleep., Disp: 30 tablet, Rfl: 1    clobetasol (Temovate) 0.05 % cream, apply to affected area twice a day ON BODY UNTIL CLEAR, AVOID USING ON FACE, GROIN, AND UNDERARMS, Disp: , Rfl:     fluocinolone-hydroq.-tretinoin (Tri-Alexa) 0.01-4-0.05 % cream, 1 Application, Disp: , Rfl:      predniSONE (Deltasone) 10 mg tablet, TAKE 3 FOR 4 DAYS, 2 FOR 4 DAYS, AND 1 UNTIL SONE (Patient not taking: Reported on 10/24/2023), Disp: 25 tablet, Rfl: 0    triamcinolone (Kenalog) 0.1 % cream, 1 Application, Disp: , Rfl:     triamcinolone (Kenalog) 0.1 % cream, Apply topically 2 times a day. Apply to affected area 1-2 times daily as needed. (Patient not taking: Reported on 10/24/2023), Disp: 15 g, Rfl: 0     Objective    BSA: 1.77 meters squared  /72 (BP Location: Right arm, Patient Position: Sitting)   Pulse 71   Temp 36.3 °C (97.3 °F) (Temporal)   Resp 16   Wt 71.7 kg (158 lb 1.1 oz)   LMP  (LMP Unknown)   SpO2 92%   BMI 28.91 kg/m²     Performance Status:  The ECOG performance scale today is ECO- Fully active, able to carry on all pre-disease performance w/o restriction.    Physical Exam  Vitals reviewed.   Constitutional:       General: She is awake. She is not in acute distress.     Appearance: Normal appearance. She is not ill-appearing.   HENT:      Mouth/Throat:      Pharynx: Oropharynx is clear. No oropharyngeal exudate.   Eyes:      General: No scleral icterus.     Conjunctiva/sclera: Conjunctivae normal.   Neck:      Trachea: Trachea and phonation normal. No tracheal tenderness.   Cardiovascular:      Rate and Rhythm: Normal rate and regular rhythm.      Heart sounds: No murmur heard.     No friction rub. No gallop.   Pulmonary:      Effort: Pulmonary effort is normal. No respiratory distress.      Breath sounds: Normal breath sounds. No stridor. No wheezing, rhonchi or rales.   Chest:      Chest wall: No mass, lacerations, deformity or tenderness.   Breasts:     Right: Skin change (due to xrt and surgery) present. No swelling, mass, nipple discharge or tenderness.      Left: No swelling, mass, nipple discharge, skin change or tenderness.   Abdominal:      General: Abdomen is flat. There is no distension.      Palpations: Abdomen is soft. There is no mass.      Tenderness: There is no  abdominal tenderness.   Lymphadenopathy:      Cervical: No cervical adenopathy.      Upper Body:      Right upper body: No supraclavicular, axillary or pectoral adenopathy.      Left upper body: No supraclavicular, axillary or pectoral adenopathy.   Skin:     General: Skin is warm and dry.      Coloration: Skin is not jaundiced.      Findings: No lesion or rash.   Neurological:      General: No focal deficit present.      Mental Status: She is alert and oriented to person, place, and time.      Motor: No weakness.      Gait: Gait normal.   Psychiatric:         Mood and Affect: Mood normal.         Thought Content: Thought content normal.         Judgment: Judgment normal.         Laboratory Data:  Lab Results   Component Value Date    WBC 6.9 09/05/2023    HGB 12.4 09/05/2023    HCT 37.9 09/05/2023    MCV 96 09/05/2023     09/05/2023       Chemistry    Lab Results   Component Value Date/Time     09/05/2023 1031    K 4.6 09/05/2023 1031     (H) 09/05/2023 1031    CO2 21 09/05/2023 1031    BUN 44 (H) 09/05/2023 1031    CREATININE 1.71 (H) 09/05/2023 1031    Lab Results   Component Value Date/Time    CALCIUM 8.9 09/05/2023 1031    ALKPHOS 72 05/05/2023 0816    AST 16 05/05/2023 0816    ALT 13 05/05/2023 0816    BILITOT 0.5 05/05/2023 0816             Radiology:  CT head wo IV contrast  Narrative: Interpreted By:  Paul Watson,   STUDY:  CT HEAD WO IV CONTRAST;  10/19/2023 7:43 am      INDICATION:  Signs/Symptoms:DIZZINESS.      COMPARISON:  None.      ACCESSION NUMBER(S):  [id number]      ORDERING CLINICIAN:  BHAVYA WILLOUGHBY      TECHNIQUE:  Noncontrast axial CT scan of head was performed. Angled reformats in  brain and bone windows were generated. The images were reviewed in  bone, brain, blood and soft tissue windows.      FINDINGS:  CSF Spaces: The ventricles, sulci and basal cisterns are within  normal limits. There is no abnormal extraaxial fluid collection.      Parenchyma:  The grey-white  differentiation is intact. There is no  mass effect or midline shift.  There is no acute intracranial  hemorrhage.      Calvarium: The calvarium is unremarkable.      Paranasal sinuses and mastoids: Visualized paranasal sinuses and  mastoids are clear.      Impression: Age related mild diffuse cerebral volume loss, otherwise unremarkable  CT head, including no evidence of mass effect.      This study was interpreted at [facility].      MACRO:  None      Signed by: Paul Watson 10/19/2023 10:00 AM  Dictation workstation:   [id number]       BI MAMMO BILATERAL SCREENING TOMOSYNTHESIS     Narrative  MRN: [id number]  Patient Name: ED ZUNIGA    STUDY:  DIGITAL MAMM SCREENING W/ MARIIA;  1/18/2023 11:50 am    ACCESSION NUMBER(S):  [id number]    ORDERING CLINICIAN:  BHAVYA WILLOUGHBY    INDICATION:  Screening. Benign right core biopsy. Right breast itching.    COMPARISON:  02/11/2021, 11/06/2017, 07/28/2015    FINDINGS:  2D and tomosynthesis images were reviewed at 1 mm slice thickness.    There are areas of scattered fibroglandular tissue.  There is a  spiculated mass in the upper outer right breast at middle depth.  Bilateral benign similar-appearing circumscribed and obscured masses  are noted. No suspicious masses or calcifications are identified in  the left breast.    IMPRESSION:  1. Right breast spiculated mass. Targeted ultrasound is recommended.  2. Right breast itching. Clinical correlation is recommended. There  is a focal area of concern, targeted ultrasound is recommended.  3. No mammographic evidence of malignancy in the left breast.    BI-RADS CATEGORY:    Category: 0 - Incomplete; Need Additional Imaging Evaluation and/or  Prior Mammograms for Comparison.  Recommendation: Ultrasound Recommended.    For any future breast imaging appointments, please call [phone]).    Patient letter sent SADEVAL      Electronically signed by: SCOTT HA MD           Assessment/Plan:    Alejandra Chowdhury is a 68 y.o. female with a history of right ILC breast cancer, who presents today follow-up evaluation.    Assess/Plan SmartLinks:   Problem List Items Addressed This Visit             ICD-10-CM    Invasive lobular carcinoma of breast in female (CMS/HCC) - Primary C50.919     - Discontinued exemestane due to kindey dysfunction. She has tried anastrozole in the past and very poorly tolerated. Suspect letrozole may have similar side effects. She is not a good tamoxifen candidate due to long family hx of blood clots. Given this will proceed with observational therapy. Pt In agreement with plan. Instructed on vigilant sx management and monitor for new masses/breast changes   - Mammogram and Mireya Zebick follow up 1/23/2024           Relevant Orders    Clinic Appointment Request MISTY SKINNER    Active cochlear Meniere's disease H81.09     - Continue to follow with ENT / PCP regarding this  - Encouraged adequate hydration            Hot flashes R23.2     - Pt information given on effexor, she will call if interested in this           Other Visit Diagnoses         Codes    Malignant neoplasm of female breast, unspecified estrogen receptor status, unspecified laterality, unspecified site of breast (CMS/[facility])     C50.919            Disposition.  - RTC May 2024 with Misty Skinner PA-C   - She was given our contact information and instructed to call with concerns/questions in the interim.    No orders of the defined types were placed in this encounter.            Misty Skinner PA-C  Hematology and Medical Oncology  [facility]

## 2023-10-23 NOTE — ASSESSMENT & PLAN NOTE
- Discontinued exemestane due to kindey dysfunction. She has tried anastrozole in the past and very poorly tolerated. Suspect letrozole may have similar side effects. She is not a good tamoxifen candidate due to long family hx of blood clots. Given this will proceed with observational therapy. Pt In agreement with plan. Instructed on vigilant sx management and monitor for new masses/breast changes   - Mammogram and Mireya Ramsey follow up 1/23/2024

## 2023-10-24 ENCOUNTER — OFFICE VISIT (OUTPATIENT)
Dept: HEMATOLOGY/ONCOLOGY | Facility: CLINIC | Age: 68
End: 2023-10-24
Payer: MEDICARE

## 2023-10-24 ENCOUNTER — APPOINTMENT (OUTPATIENT)
Dept: HEMATOLOGY/ONCOLOGY | Facility: CLINIC | Age: 68
End: 2023-10-24
Payer: MEDICARE

## 2023-10-24 VITALS
DIASTOLIC BLOOD PRESSURE: 72 MMHG | BODY MASS INDEX: 28.91 KG/M2 | SYSTOLIC BLOOD PRESSURE: 138 MMHG | HEART RATE: 71 BPM | WEIGHT: 158.07 LBS | OXYGEN SATURATION: 92 % | RESPIRATION RATE: 16 BRPM | TEMPERATURE: 97.3 F

## 2023-10-24 DIAGNOSIS — C50.919 MALIGNANT NEOPLASM OF FEMALE BREAST, UNSPECIFIED ESTROGEN RECEPTOR STATUS, UNSPECIFIED LATERALITY, UNSPECIFIED SITE OF BREAST (MULTI): ICD-10-CM

## 2023-10-24 DIAGNOSIS — R23.2 HOT FLASHES: ICD-10-CM

## 2023-10-24 DIAGNOSIS — C50.919 INVASIVE LOBULAR CARCINOMA OF BREAST IN FEMALE (MULTI): Primary | ICD-10-CM

## 2023-10-24 DIAGNOSIS — H81.09 ACTIVE COCHLEAR MENIERE'S DISEASE, UNSPECIFIED LATERALITY: ICD-10-CM

## 2023-10-24 PROCEDURE — 1126F AMNT PAIN NOTED NONE PRSNT: CPT

## 2023-10-24 PROCEDURE — 3078F DIAST BP <80 MM HG: CPT

## 2023-10-24 PROCEDURE — 1036F TOBACCO NON-USER: CPT

## 2023-10-24 PROCEDURE — 1160F RVW MEDS BY RX/DR IN RCRD: CPT

## 2023-10-24 PROCEDURE — 99215 OFFICE O/P EST HI 40 MIN: CPT

## 2023-10-24 PROCEDURE — 3044F HG A1C LEVEL LT 7.0%: CPT

## 2023-10-24 PROCEDURE — 1159F MED LIST DOCD IN RCRD: CPT

## 2023-10-24 PROCEDURE — 3075F SYST BP GE 130 - 139MM HG: CPT

## 2023-10-24 PROCEDURE — 4010F ACE/ARB THERAPY RXD/TAKEN: CPT

## 2023-10-24 ASSESSMENT — ENCOUNTER SYMPTOMS
LOSS OF SENSATION IN FEET: 0
OCCASIONAL FEELINGS OF UNSTEADINESS: 0
DEPRESSION: 0

## 2023-10-24 ASSESSMENT — PATIENT HEALTH QUESTIONNAIRE - PHQ9
5. POOR APPETITE OR OVEREATING: NOT AT ALL
3. TROUBLE FALLING OR STAYING ASLEEP OR SLEEPING TOO MUCH: NOT AT ALL
1. LITTLE INTEREST OR PLEASURE IN DOING THINGS: 0
SUM OF ALL RESPONSES TO PHQ QUESTIONS 1-9: 0
8. MOVING OR SPEAKING SO SLOWLY THAT OTHER PEOPLE COULD HAVE NOTICED. OR THE OPPOSITE, BEING SO FIGETY OR RESTLESS THAT YOU HAVE BEEN MOVING AROUND A LOT MORE THAN USUAL: NOT AT ALL
1. LITTLE INTEREST OR PLEASURE IN DOING THINGS: NOT AT ALL
5. POOR APPETITE OR OVEREATING: NOT AT ALL
8. MOVING OR SPEAKING SO SLOWLY THAT OTHER PEOPLE COULD HAVE NOTICED. OR THE OPPOSITE, BEING SO FIGETY OR RESTLESS THAT YOU HAVE BEEN MOVING AROUND A LOT MORE THAN USUAL: 0
4. FEELING TIRED OR HAVING LITTLE ENERGY: NOT AT ALL
7. TROUBLE CONCENTRATING ON THINGS, SUCH AS READING THE NEWSPAPER OR WATCHING TELEVISION: NOT AT ALL
2. FEELING DOWN, DEPRESSED OR HOPELESS: NOT AT ALL
6. FEELING BAD ABOUT YOURSELF - OR THAT YOU ARE A FAILURE OR HAVE LET YOURSELF OR YOUR FAMILY DOWN: 0
4. FEELING TIRED OR HAVING LITTLE ENERGY: 0
8. MOVING OR SPEAKING SO SLOWLY THAT OTHER PEOPLE COULD HAVE NOTICED. OR THE OPPOSITE, BEING SO FIGETY OR RESTLESS THAT YOU HAVE BEEN MOVING AROUND A LOT MORE THAN USUAL: NOT AT ALL
2. FEELING DOWN, DEPRESSED, IRRITABLE, OR HOPELESS: 0
4. FEELING TIRED OR HAVING LITTLE ENERGY: NOT AT ALL
6. FEELING BAD ABOUT YOURSELF - OR THAT YOU ARE A FAILURE OR HAVE LET YOURSELF OR YOUR FAMILY DOWN: NOT AT ALL
9. THOUGHTS THAT YOU WOULD BE BETTER OFF DEAD, OR OF HURTING YOURSELF: 0
9. THOUGHTS THAT YOU WOULD BE BETTER OFF DEAD, OR OF HURTING YOURSELF: NOT AT ALL
SUM OF ALL RESPONSES TO PHQ QUESTIONS 1-9: 0
7. TROUBLE CONCENTRATING ON THINGS, SUCH AS READING THE NEWSPAPER OR WATCHING TELEVISION: NOT AT ALL
1. LITTLE INTEREST OR PLEASURE IN DOING THINGS: NOT AT ALL
2. FEELING DOWN, DEPRESSED OR HOPELESS: NOT AT ALL
6. FEELING BAD ABOUT YOURSELF - OR THAT YOU ARE A FAILURE OR HAVE LET YOURSELF OR YOUR FAMILY DOWN: NOT AT ALL
5. POOR APPETITE OR OVEREATING: 0
9. THOUGHTS THAT YOU WOULD BE BETTER OFF DEAD, OR OF HURTING YOURSELF: NOT AT ALL
2. FEELING DOWN, DEPRESSED, IRRITABLE, OR HOPELESS: NOT AT ALL
3. TROUBLE FALLING OR STAYING ASLEEP OR SLEEPING TOO MUCH: NOT AT ALL
1. LITTLE INTEREST OR PLEASURE IN DOING THINGS: NOT AT ALL
3. TROUBLE FALLING OR STAYING ASLEEP OR SLEEPING TOO MUCH: 0
SUM OF ALL RESPONSES TO PHQ9 QUESTIONS 1 AND 2: 0
7. TROUBLE CONCENTRATING ON THINGS, SUCH AS READING THE NEWSPAPER OR WATCHING TELEVISION: 0

## 2023-10-24 ASSESSMENT — COLUMBIA-SUICIDE SEVERITY RATING SCALE - C-SSRS
1. IN THE PAST MONTH, HAVE YOU WISHED YOU WERE DEAD OR WISHED YOU COULD GO TO SLEEP AND NOT WAKE UP?: NO
6. HAVE YOU EVER DONE ANYTHING, STARTED TO DO ANYTHING, OR PREPARED TO DO ANYTHING TO END YOUR LIFE?: NO
2. HAVE YOU ACTUALLY HAD ANY THOUGHTS OF KILLING YOURSELF?: NO

## 2023-10-24 ASSESSMENT — PAIN SCALES - GENERAL: PAINLEVEL: 0-NO PAIN

## 2023-10-30 LAB
AMPHETAMINES UR QL SCN: ABNORMAL
BARBITURATES UR QL SCN: ABNORMAL
BZE UR QL SCN: ABNORMAL
CANNABINOIDS UR QL SCN: ABNORMAL
CREAT UR-MCNC: 107.1 MG/DL (ref 20–320)
PCP UR QL SCN: ABNORMAL

## 2023-11-10 ENCOUNTER — OFFICE VISIT (OUTPATIENT)
Dept: ORTHOPEDIC SURGERY | Facility: CLINIC | Age: 68
End: 2023-11-10
Payer: MEDICARE

## 2023-11-10 DIAGNOSIS — S63.502S WRIST SPRAIN, LEFT, SEQUELA: Primary | ICD-10-CM

## 2023-11-10 PROCEDURE — 1126F AMNT PAIN NOTED NONE PRSNT: CPT | Performed by: STUDENT IN AN ORGANIZED HEALTH CARE EDUCATION/TRAINING PROGRAM

## 2023-11-10 PROCEDURE — 3044F HG A1C LEVEL LT 7.0%: CPT | Performed by: STUDENT IN AN ORGANIZED HEALTH CARE EDUCATION/TRAINING PROGRAM

## 2023-11-10 PROCEDURE — 1160F RVW MEDS BY RX/DR IN RCRD: CPT | Performed by: STUDENT IN AN ORGANIZED HEALTH CARE EDUCATION/TRAINING PROGRAM

## 2023-11-10 PROCEDURE — 4010F ACE/ARB THERAPY RXD/TAKEN: CPT | Performed by: STUDENT IN AN ORGANIZED HEALTH CARE EDUCATION/TRAINING PROGRAM

## 2023-11-10 PROCEDURE — 1159F MED LIST DOCD IN RCRD: CPT | Performed by: STUDENT IN AN ORGANIZED HEALTH CARE EDUCATION/TRAINING PROGRAM

## 2023-11-10 PROCEDURE — 20605 DRAIN/INJ JOINT/BURSA W/O US: CPT | Performed by: STUDENT IN AN ORGANIZED HEALTH CARE EDUCATION/TRAINING PROGRAM

## 2023-11-10 PROCEDURE — 3060F POS MICROALBUMINURIA REV: CPT | Performed by: STUDENT IN AN ORGANIZED HEALTH CARE EDUCATION/TRAINING PROGRAM

## 2023-11-10 PROCEDURE — 3078F DIAST BP <80 MM HG: CPT | Performed by: STUDENT IN AN ORGANIZED HEALTH CARE EDUCATION/TRAINING PROGRAM

## 2023-11-10 PROCEDURE — 3075F SYST BP GE 130 - 139MM HG: CPT | Performed by: STUDENT IN AN ORGANIZED HEALTH CARE EDUCATION/TRAINING PROGRAM

## 2023-11-10 PROCEDURE — 1036F TOBACCO NON-USER: CPT | Performed by: STUDENT IN AN ORGANIZED HEALTH CARE EDUCATION/TRAINING PROGRAM

## 2023-11-10 PROCEDURE — 99214 OFFICE O/P EST MOD 30 MIN: CPT | Performed by: STUDENT IN AN ORGANIZED HEALTH CARE EDUCATION/TRAINING PROGRAM

## 2023-11-10 RX ORDER — LIDOCAINE HYDROCHLORIDE 10 MG/ML
1 INJECTION INFILTRATION; PERINEURAL
Status: COMPLETED | OUTPATIENT
Start: 2023-11-10 | End: 2023-11-10

## 2023-11-10 RX ADMIN — LIDOCAINE HYDROCHLORIDE 1 ML: 10 INJECTION INFILTRATION; PERINEURAL at 10:19

## 2023-11-10 ASSESSMENT — PAIN SCALES - GENERAL: PAINLEVEL_OUTOF10: 4

## 2023-11-10 ASSESSMENT — PAIN - FUNCTIONAL ASSESSMENT: PAIN_FUNCTIONAL_ASSESSMENT: 0-10

## 2023-11-10 NOTE — PROGRESS NOTES
History of Present Illness  Patient returns today for evaluation of left wrist sprain.  Date of injury was October 12, 2023.  Overall pain and swelling has improved.  She does have mild tenderness over the STT joint.  X-rays reviewed from prior visit which do show early STT arthritis.     Physical Examination:  Left upper extremity:  The patient appears to be their stated age, is in no apparent distress, and is oriented x3. The patients mood and affect are appropriate. The patients gait is normal. The examination of the limb in question was performed in comparison to the contralateral limb.    On musculoskeletal examination, there is tenderness to the left STT.  Considerable improvement in swelling of the midcarpal and radiocarpal joint..  Sensation and motor function are intact in the radial, and median nerve distribution. There is no obvious thenar atrophy, and thenar strength is 5/5. There is no intrinsic atrophy, and intrinsic strength is 5/5.  The patient can make a full composite fist. The hand itself is warm and well perfused. The skin is intact throughout. The contralateral hand/wrist are normal to inspection, range of motion, stability, and strength.    M Inj/Asp: L intercarpal on 11/10/2023 10:19 AM  Indications: pain  Details: 24 G needle, volar approach  Medications: 10 mg triamcinolone acetonide 10 mg/mL; 1 mL lidocaine 10 mg/mL (1 %)  Outcome: tolerated well, no immediate complications  Procedure, treatment alternatives, risks and benefits explained, specific risks discussed. Consent was given by the patient. Immediately prior to procedure a time out was called to verify the correct patient, procedure, equipment, support staff and site/side marked as required. Patient was prepped and draped in the usual sterile fashion.             Assessment:  Patient with improving left wrist sprain.  Does have pain over the STT and early arthritis on prior films    Plan:   Cortisone injection performed today  without issue.  She will follow-up as needed    Andria Villarreal MD

## 2023-11-18 DIAGNOSIS — I10 BENIGN ESSENTIAL HYPERTENSION: ICD-10-CM

## 2023-11-19 RX ORDER — LOSARTAN POTASSIUM 25 MG/1
25 TABLET ORAL DAILY
Qty: 90 TABLET | Refills: 1 | Status: SHIPPED | OUTPATIENT
Start: 2023-11-19 | End: 2024-05-02

## 2023-12-23 DIAGNOSIS — G47.00 INSOMNIA, UNSPECIFIED TYPE: ICD-10-CM

## 2023-12-26 RX ORDER — ZOLPIDEM TARTRATE 5 MG/1
5 TABLET ORAL NIGHTLY PRN
Qty: 30 TABLET | Refills: 0 | OUTPATIENT
Start: 2023-12-26

## 2024-01-09 ENCOUNTER — TELEPHONE (OUTPATIENT)
Dept: PRIMARY CARE | Facility: CLINIC | Age: 69
End: 2024-01-09
Payer: MEDICARE

## 2024-01-09 DIAGNOSIS — G47.00 INSOMNIA, UNSPECIFIED TYPE: ICD-10-CM

## 2024-01-09 DIAGNOSIS — L30.9 DERMATITIS: ICD-10-CM

## 2024-01-09 NOTE — TELEPHONE ENCOUNTER
PT FRED WILLOUGHBY     PT CAME IN OFFICE FOR MED REFILL    TRIAMCINOLONE 0.1  ZOLPIDEM     [facility]

## 2024-01-15 NOTE — PROGRESS NOTES
Alejandra Chowdhury female   1955 68 y.o.   [id number]      Chief Complaint  Annual mammogram and exam, history of right breast cancer    History Of Present Illness  Alejandra Chowdhury is a very pleasant 68 year old  woman diagnosed 2023 with right breast invasive lobular carcinoma, grade 2, ER> 95%, OH 95%, HER2 negative. 3/22/2023 Sheyla Giles performed right breast Magseed localized partial mastectomy and sentinel lymph node biopsy. Final pathology revealed 1.2 cm invasive lobular carcinoma, sentinel lymph node, negative (0/1), margins negative. She completed post-operative radiation on 2023. She started Exemestane, discontinuing due to decreased kidney function. She has family history of breast cancer in her paternal aunt, age 40. She has family history of ovarian cancer in her sister, age 60. To her knowledge, neither had genetic testing. She has a remote history of right breast excisional biopsy several years ago, benign per patient report. She is unable to recall exact date. She presents today for annual mammogram and exam. She is here with her , Ace. She denies any new masses or lumps.   Stage IA [id number]     FEMALE HISTORY: menarche age 16, , first birth age 18,  x 1 year, OCP's x 11 years, surgical menopause age 40s, hysterectomy age 33 due to endometriosis, benign pathology, one ovary remained, contralateral ovary excised in her 40s, HRT x 7 years, scattered fibroglandular tissue     FAMILY CANCER HISTORY:  Paternal Aunt: Breast cancer, age 40  Sister: Ovarian cancer, age 60  Paternal Grandfather: Liver cancer, age 70      Surgical History  She has a past surgical history that includes Other surgical history (2020); Other surgical history (2020); Other surgical history (2020); Other surgical history (2020); Other surgical history (2020); Other surgical history (2022); and Other surgical history (2022).     Social History  She  reports that she has never smoked. She has never been exposed to tobacco smoke. She has never used smokeless tobacco. She reports that she does not currently use alcohol. She reports that she does not use drugs.    Family History  Family History   Problem Relation Name Age of Onset    Heart disease Mother          CVA    Other (cardiac disorder) Mother      Other (cva) Mother      Heart disease Father          CVA    Other (cardiac disorder) Father      Other (cva) Father          Allergies  Iodinated contrast media and Iodine    Medications  Current Outpatient Medications   Medication Instructions    blood sugar diagnostic (Blood Glucose Test) strip USE as DIRECTED    clobetasol (Temovate) 0.05 % cream apply to affected area twice a day ON BODY UNTIL CLEAR, AVOID USING ON FACE, GROIN, AND UNDERARMS    ergocalciferol (VITAMIN D-2) 1,250 mcg, oral, Weekly    fluocinolone-hydroq.-tretinoin (Tri-Alexa) 0.01-4-0.05 % cream 1 Application    gabapentin (NEURONTIN) 600 mg, oral, 2 times daily    losartan (COZAAR) 25 mg, oral, Daily    metoprolol succinate XL (TOPROL-XL) 25 mg, oral, Daily, DO NOT CRUSH OR CHEW    NP Thyroid 30 mg, oral, Daily    predniSONE (Deltasone) 10 mg tablet TAKE 3 FOR 4 DAYS, 2 FOR 4 DAYS, AND 1 UNTIL SONE    sertraline (ZOLOFT) 50 mg, oral, Daily    simvastatin (ZOCOR) 10 mg, oral, Nightly    triamcinolone (Kenalog) 0.1 % cream 1 Application    triamcinolone (Kenalog) 0.1 % cream Topical, 2 times daily, Apply to affected area 1-2 times daily as needed.    zolpidem (AMBIEN) 5 mg, oral, Nightly PRN         REVIEW OF SYSTEMS    Constitutional:  Negative for appetite change, fatigue, fever and unexpected weight change.   HENT:  Negative for ear pain, hearing loss, nosebleeds, sore throat and trouble swallowing.    Eyes:  Negative for discharge, itching and visual disturbance.   Respiratory:  Negative for cough, chest tightness and shortness of breath.    Cardiovascular:  Negative for chest pain,  palpitations and leg swelling.   Breast: as indicated in HPI  Gastrointestinal:  Negative for abdominal pain, constipation, diarrhea and nausea.   Endocrine: Negative for cold intolerance and heat intolerance.   Genitourinary:  Negative for dysuria, frequency, hematuria, pelvic pain and vaginal bleeding.   Musculoskeletal:  Negative for arthralgias, back pain, gait problem, joint swelling and myalgias.   Skin:  Negative for color change and rash.   Allergic/Immunologic: Negative for environmental allergies and food allergies.   Neurological:  Negative for dizziness, tremors, speech difficulty, weakness, numbness and headaches.   Hematological:  Does not bruise/bleed easily.   Psychiatric/Behavioral:  Negative for agitation, dysphoric mood and sleep disturbance. The patient is not nervous/anxious.         Past Medical History  She has a past medical history of Acute gastritis with bleeding (04/10/2021), Allergic contact dermatitis, unspecified cause (05/22/2022), Cutaneous abscess of groin (10/04/2021), Cutaneous abscess of left axilla (10/20/2021), Disorder of kidney and ureter, unspecified (10/25/2021), Effusion, right foot (03/18/2021), Encounter for general adult medical examination without abnormal findings (02/08/2021), Encounter for general adult medical examination without abnormal findings (05/18/2022), Encounter for other screening for malignant neoplasm of breast, Encounter for screening for malignant neoplasm of colon (09/23/2021), Nonspecific lymphadenitis, unspecified (10/20/2021), Otitis media, unspecified, unspecified ear (01/09/2020), Pain in right toe(s) (03/18/2021), Pain in unspecified joint (05/08/2020), Personal history of diseases of the skin and subcutaneous tissue (10/20/2021), Personal history of diseases of the skin and subcutaneous tissue (07/22/2020), Personal history of diseases of the skin and subcutaneous tissue (07/22/2020), Personal history of other drug therapy (09/26/2021),  Personal history of other drug therapy (01/09/2020), Personal history of other endocrine, nutritional and metabolic disease (09/23/2021), Personal history of other specified conditions (05/18/2022), Personal history of other specified conditions (05/18/2022), Rash and other nonspecific skin eruption (07/22/2020), Rash and other nonspecific skin eruption (04/06/2021), and Rash and other nonspecific skin eruption (05/18/2022).     Physical Exam  Patient is alert and oriented x3 and in a relaxed and appropriate mood. Her gait is steady and hand grasps are equal. Sclera is clear. The breasts are asymmetrical, left larger. The right breast has a well-healed vague partial circumareolar incision from previous excisional biopsy and a well-healed partial mastectomy incision, superior lateral quadrant. The right axilla has a well-healed biopsy incision. The right breast, 12:00, 1 cm from the nipple, 1.5 x 1.5 cm hard, round, non-mobile mass. The right nipple areola has moderate skin thickening consistent with post-operative radiation changes. The right inframammary fold has mild hyperpigmentation consistent with post-operative radiation changes. The remaining tissue of both breasts is soft without palpable abnormalities, discrete nodules or masses. The left breast skin and nipple appear normal. There is no cervical, supraclavicular or axillary lymphadenopathy. Heart rate and rhythm normal, S1 and S2 appreciated. The lungs are clear to auscultation bilaterally. Abdomen is soft and non-tender.       Physical Exam  Chest:              Last Recorded Vitals  Vitals:    01/23/24 0742   BP: 102/60   Pulse: 70   Resp: 16       Relevant Results   Time was spent viewing digital images of the radiology testing with the patient. I explained the results in depth, along with suggested explanation for follow up recommendations based on the testing results. BI-RADS Category     Imaging  Narrative & Impression   Interpreted By:  Dorothy  Tamara Das   STUDY:  BI MAMMO BILATERAL DIAGNOSTIC TOMOSYNTHESIS; BI US BREAST LIMITED  RIGHT;  1/23/2024 9:15 am; 1/23/2024 9:19 am      ACCESSION NUMBER(S):  [id number]; [id number]      ORDERING CLINICIAN:  BREANNE PLEITEZ      INDICATION:  Annual mammogram. Recent diagnosis of right breast invasive lobular  carcinoma status post lumpectomy and sentinel lymph node biopsy in  March 2023 followed by radiation. Prior benign right breast core  biopsy.      COMPARISON:  03/14/2023, 02/08/2023, 01/18/2023, 02/11/2021.      FINDINGS:  MAMMOGRAPHY: 2D and tomosynthesis images were reviewed at 1 mm slice  thickness.      Density:  There are areas of scattered fibroglandular tissue.      New postlumpectomy scarring with associated fat necrosis and surgical  clips are seen in the superolateral right breast at middle to  posterior depth. Right axillary scarring is noted. Diffuse right  breast skin and trabecular thickening are consistent with  postradiation changes.      A new high density irregular mass is noted in the subareolar region  of the right breast. No suspicious masses or calcifications are  identified in the left breast.      ULTRASOUND:  A targeted ultrasound of the right subareolar breast and  axilla was performed by a registered sonographer using elastography.      A complex cystic and solid mass is seen at the 12 o'clock position 1  cm from the nipple. The mass measures 1.6 x 1.0 x 1.3 cm. It  demonstrates internal vascularity and intermediate stiffness on  elastography. This corresponds with the mammographic subareolar right  breast mass.      A targeted ultrasound of the right axilla demonstrates 2  morphologically normal axillary lymph nodes.      IMPRESSION:  1. Indeterminate right breast mass in the subareolar region without  axillary lymphadenopathy. Although this could represent postsurgical  changes such as fat necrosis, further evaluation with surgical  consultation and  ultrasound-guided biopsy is recommended.  2. No mammographic evidence of malignancy in the left breast.      The findings and recommendations were discussed with referring  provider Sunita Ramsey CNP who discussed the findings with the  patient. A pre-procedure form was filled out.      Method of Detection: Category Sdbt - 3D Screening      BI-RADS CATEGORY:      BI-RADS Category:  4 Suspicious.  Recommendation:  Ultrasound - Guided Breast Biopsy.  Recommended Date:  Immediate.  Laterality:  Right.       Assessment/Plan   Abnormal mammogram, right breast mass, history of right breast cancer, family history of breast cancer, scattered fibroglandular tissue    Plan: Right ultrasound guided biopsy    Patient Discussion/Summary  I recommend a right breast ultrasound guided biopsy. A breast radiology physician will perform the procedure. Possible diagnoses include benign, atypia or cancer. Bruising and mild discomfort after the biopsy is normal and will improve. I typically have results in 3-5 business days. I will call you with the results, please have your phone handy to take my call. If you receive medical information from [facility] Personal Health Record, it is possible to view or see results of your biopsy or procedure before I contact you directly. I will provide recommendations for future follow up based on your biopsy results.     You can see your health information, review clinical summaries from office visits & test results online when you follow your health with [facility], a personal health record. To sign up go to [email or web address]. If you need assistance with signing up or trouble getting into your account call "[employer] Patient Line 24/7 at [phone].    Should you have any questions or concerns after biopsy, please do not hesitate to call my office at [phone]. If it has been more than a week since your biopsy was performed and you have not received results, please call my office at  [phone]. Thank you for choosing [facility] and trusting me as your healthcare provider. I am honored to be a provider on your health care team and I remain dedicated to helping you achieve your health goals.        Sunita Ramsey, APRN-CNP

## 2024-01-23 ENCOUNTER — HOSPITAL ENCOUNTER (OUTPATIENT)
Dept: RADIOLOGY | Facility: HOSPITAL | Age: 69
Discharge: HOME | End: 2024-01-23
Payer: MEDICARE

## 2024-01-23 ENCOUNTER — OFFICE VISIT (OUTPATIENT)
Dept: SURGICAL ONCOLOGY | Facility: HOSPITAL | Age: 69
End: 2024-01-23
Payer: MEDICARE

## 2024-01-23 VITALS
DIASTOLIC BLOOD PRESSURE: 60 MMHG | SYSTOLIC BLOOD PRESSURE: 102 MMHG | HEIGHT: 62 IN | HEART RATE: 70 BPM | WEIGHT: 153 LBS | RESPIRATION RATE: 16 BRPM | BODY MASS INDEX: 28.16 KG/M2

## 2024-01-23 DIAGNOSIS — Z85.3 ENCOUNTER FOR FOLLOW-UP SURVEILLANCE OF BREAST CANCER: ICD-10-CM

## 2024-01-23 DIAGNOSIS — C50.411 MALIGNANT NEOPLASM OF UPPER-OUTER QUADRANT OF RIGHT FEMALE BREAST (MULTI): ICD-10-CM

## 2024-01-23 DIAGNOSIS — N63.41 SUBAREOLAR MASS OF RIGHT BREAST: ICD-10-CM

## 2024-01-23 DIAGNOSIS — R92.8 ABNORMAL MAMMOGRAM: Primary | ICD-10-CM

## 2024-01-23 DIAGNOSIS — Z17.0 ESTROGEN RECEPTOR POSITIVE STATUS (ER+): ICD-10-CM

## 2024-01-23 DIAGNOSIS — Z08 ENCOUNTER FOR FOLLOW-UP SURVEILLANCE OF BREAST CANCER: ICD-10-CM

## 2024-01-23 DIAGNOSIS — R92.8 OTHER ABNORMAL AND INCONCLUSIVE FINDINGS ON DIAGNOSTIC IMAGING OF BREAST: ICD-10-CM

## 2024-01-23 PROCEDURE — 77066 DX MAMMO INCL CAD BI: CPT | Performed by: STUDENT IN AN ORGANIZED HEALTH CARE EDUCATION/TRAINING PROGRAM

## 2024-01-23 PROCEDURE — 99214 OFFICE O/P EST MOD 30 MIN: CPT | Performed by: NURSE PRACTITIONER

## 2024-01-23 PROCEDURE — 4010F ACE/ARB THERAPY RXD/TAKEN: CPT | Performed by: NURSE PRACTITIONER

## 2024-01-23 PROCEDURE — G0279 TOMOSYNTHESIS, MAMMO: HCPCS | Performed by: STUDENT IN AN ORGANIZED HEALTH CARE EDUCATION/TRAINING PROGRAM

## 2024-01-23 PROCEDURE — 1036F TOBACCO NON-USER: CPT | Performed by: NURSE PRACTITIONER

## 2024-01-23 PROCEDURE — 1159F MED LIST DOCD IN RCRD: CPT | Performed by: NURSE PRACTITIONER

## 2024-01-23 PROCEDURE — 1126F AMNT PAIN NOTED NONE PRSNT: CPT | Performed by: NURSE PRACTITIONER

## 2024-01-23 PROCEDURE — 76642 ULTRASOUND BREAST LIMITED: CPT | Mod: RT

## 2024-01-23 PROCEDURE — 76642 ULTRASOUND BREAST LIMITED: CPT | Performed by: STUDENT IN AN ORGANIZED HEALTH CARE EDUCATION/TRAINING PROGRAM

## 2024-01-23 PROCEDURE — 1157F ADVNC CARE PLAN IN RCRD: CPT | Performed by: NURSE PRACTITIONER

## 2024-01-23 PROCEDURE — 76982 USE 1ST TARGET LESION: CPT | Mod: RT

## 2024-01-23 PROCEDURE — 1160F RVW MEDS BY RX/DR IN RCRD: CPT | Performed by: NURSE PRACTITIONER

## 2024-01-23 PROCEDURE — 3074F SYST BP LT 130 MM HG: CPT | Performed by: NURSE PRACTITIONER

## 2024-01-23 PROCEDURE — 77062 BREAST TOMOSYNTHESIS BI: CPT

## 2024-01-23 PROCEDURE — 3078F DIAST BP <80 MM HG: CPT | Performed by: NURSE PRACTITIONER

## 2024-01-23 NOTE — PATIENT INSTRUCTIONS
I recommend a right breast ultrasound guided biopsy. A breast radiology physician will perform the procedure. Possible diagnoses include benign, atypia or cancer. Bruising and mild discomfort after the biopsy is normal and will improve. I typically have results in 3-5 business days. I will call you with the results, please have your phone handy to take my call. If you receive medical information from [facility] Personal Health Record, it is possible to view or see results of your biopsy or procedure before I contact you directly. I will provide recommendations for future follow up based on your biopsy results.     You can see your health information, review clinical summaries from office visits & test results online when you follow your health with MY  Chart, a personal health record. To sign up go to [email or web address]. If you need assistance with signing up or trouble getting into your account call [employer] Patient Line 24/7 at [phone].    Should you have any questions or concerns after biopsy, please do not hesitate to call my office at [phone]. If it has been more than a week since your biopsy was performed and you have not received results, please call my office at [phone]. Thank you for choosing [facility] and trusting me as your healthcare provider. I am honored to be a provider on your health care team and I remain dedicated to helping you achieve your health goals.

## 2024-01-29 ENCOUNTER — HOSPITAL ENCOUNTER (OUTPATIENT)
Dept: RADIOLOGY | Facility: HOSPITAL | Age: 69
Discharge: HOME | End: 2024-01-29
Payer: MEDICARE

## 2024-01-29 DIAGNOSIS — R92.8 ABNORMAL MAMMOGRAM: ICD-10-CM

## 2024-01-29 PROCEDURE — 88305 TISSUE EXAM BY PATHOLOGIST: CPT | Performed by: STUDENT IN AN ORGANIZED HEALTH CARE EDUCATION/TRAINING PROGRAM

## 2024-01-29 PROCEDURE — 19083 BX BREAST 1ST LESION US IMAG: CPT | Mod: RIGHT SIDE | Performed by: STUDENT IN AN ORGANIZED HEALTH CARE EDUCATION/TRAINING PROGRAM

## 2024-01-29 PROCEDURE — 77065 DX MAMMO INCL CAD UNI: CPT | Mod: RT

## 2024-01-29 PROCEDURE — 77065 DX MAMMO INCL CAD UNI: CPT | Mod: RIGHT SIDE | Performed by: STUDENT IN AN ORGANIZED HEALTH CARE EDUCATION/TRAINING PROGRAM

## 2024-01-29 PROCEDURE — 2720000007 HC OR 272 NO HCPCS

## 2024-01-29 PROCEDURE — 2500000005 HC RX 250 GENERAL PHARMACY W/O HCPCS: Performed by: STUDENT IN AN ORGANIZED HEALTH CARE EDUCATION/TRAINING PROGRAM

## 2024-01-29 PROCEDURE — 88305 TISSUE EXAM BY PATHOLOGIST: CPT | Mod: TC,SUR,STJLAB | Performed by: NURSE PRACTITIONER

## 2024-01-29 PROCEDURE — 19083 BX BREAST 1ST LESION US IMAG: CPT | Mod: RT

## 2024-01-29 RX ADMIN — Medication 10 ML: at 09:25

## 2024-02-01 ENCOUNTER — TELEPHONE (OUTPATIENT)
Dept: SURGERY | Facility: HOSPITAL | Age: 69
End: 2024-02-01
Payer: MEDICARE

## 2024-02-01 LAB
LABORATORY COMMENT REPORT: NORMAL
PATH REPORT.COMMENTS IMP SPEC: NORMAL
PATH REPORT.FINAL DX SPEC: NORMAL
PATH REPORT.GROSS SPEC: NORMAL
PATH REPORT.RELEVANT HX SPEC: NORMAL
PATH REPORT.TOTAL CANCER: NORMAL

## 2024-02-01 NOTE — TELEPHONE ENCOUNTER
Spoke with Benoit regarding right breast biopsy results. Awaiting concordance report, patient aware. If concordant, return at time of next annual mammogram.

## 2024-02-13 ENCOUNTER — LAB (OUTPATIENT)
Dept: LAB | Facility: LAB | Age: 69
End: 2024-02-13
Payer: MEDICARE

## 2024-02-13 ENCOUNTER — OFFICE VISIT (OUTPATIENT)
Dept: PRIMARY CARE | Facility: CLINIC | Age: 69
End: 2024-02-13
Payer: MEDICARE

## 2024-02-13 VITALS
HEART RATE: 62 BPM | WEIGHT: 159 LBS | SYSTOLIC BLOOD PRESSURE: 120 MMHG | OXYGEN SATURATION: 98 % | HEIGHT: 62 IN | BODY MASS INDEX: 29.26 KG/M2 | DIASTOLIC BLOOD PRESSURE: 64 MMHG | TEMPERATURE: 97.9 F

## 2024-02-13 DIAGNOSIS — E78.2 MIXED HYPERLIPIDEMIA: ICD-10-CM

## 2024-02-13 DIAGNOSIS — E11.69 DM TYPE 2 WITH DIABETIC DYSLIPIDEMIA (MULTI): Primary | ICD-10-CM

## 2024-02-13 DIAGNOSIS — E03.9 ACQUIRED HYPOTHYROIDISM: ICD-10-CM

## 2024-02-13 DIAGNOSIS — Q06.9 CONGENITAL ANOMALY OF SPINAL CORD (MULTI): ICD-10-CM

## 2024-02-13 DIAGNOSIS — N18.31 STAGE 3A CHRONIC KIDNEY DISEASE (MULTI): ICD-10-CM

## 2024-02-13 DIAGNOSIS — I10 BENIGN ESSENTIAL HYPERTENSION: ICD-10-CM

## 2024-02-13 DIAGNOSIS — G47.00 INSOMNIA, UNSPECIFIED TYPE: ICD-10-CM

## 2024-02-13 DIAGNOSIS — Z79.899 MEDICATION MANAGEMENT: ICD-10-CM

## 2024-02-13 DIAGNOSIS — E78.5 DM TYPE 2 WITH DIABETIC DYSLIPIDEMIA (MULTI): Primary | ICD-10-CM

## 2024-02-13 DIAGNOSIS — C50.919 MALIGNANT NEOPLASM OF FEMALE BREAST, UNSPECIFIED ESTROGEN RECEPTOR STATUS, UNSPECIFIED LATERALITY, UNSPECIFIED SITE OF BREAST (MULTI): ICD-10-CM

## 2024-02-13 DIAGNOSIS — M15.9 GENERALIZED OSTEOARTHROSIS: ICD-10-CM

## 2024-02-13 DIAGNOSIS — Z89.512 STATUS POST BELOW-KNEE AMPUTATION OF LEFT LOWER EXTREMITY (MULTI): ICD-10-CM

## 2024-02-13 PROBLEM — R21 RASH AND OTHER NONSPECIFIC SKIN ERUPTION: Status: RESOLVED | Noted: 2022-06-01 | Resolved: 2024-02-13

## 2024-02-13 PROBLEM — R50.9 FEBRILE: Status: RESOLVED | Noted: 2023-09-13 | Resolved: 2024-02-13

## 2024-02-13 LAB
ALBUMIN SERPL BCP-MCNC: 4.1 G/DL (ref 3.4–5)
ALP SERPL-CCNC: 79 U/L (ref 33–136)
ALT SERPL W P-5'-P-CCNC: 23 U/L (ref 7–45)
AMPHETAMINES UR QL SCN: NORMAL
ANION GAP SERPL CALC-SCNC: 15 MMOL/L (ref 10–20)
AST SERPL W P-5'-P-CCNC: 24 U/L (ref 9–39)
BARBITURATES UR QL SCN: NORMAL
BASOPHILS # BLD AUTO: 0.07 X10*3/UL (ref 0–0.1)
BASOPHILS NFR BLD AUTO: 0.9 %
BILIRUB SERPL-MCNC: 0.5 MG/DL (ref 0–1.2)
BUN SERPL-MCNC: 25 MG/DL (ref 6–23)
BZE UR QL SCN: NORMAL
CALCIUM SERPL-MCNC: 9.7 MG/DL (ref 8.6–10.3)
CANNABINOIDS UR QL SCN: NORMAL
CHLORIDE SERPL-SCNC: 103 MMOL/L (ref 98–107)
CO2 SERPL-SCNC: 29 MMOL/L (ref 21–32)
CREAT SERPL-MCNC: 1.23 MG/DL (ref 0.5–1.05)
CREAT UR-MCNC: 58.8 MG/DL (ref 20–320)
EGFRCR SERPLBLD CKD-EPI 2021: 48 ML/MIN/1.73M*2
EOSINOPHIL # BLD AUTO: 0.43 X10*3/UL (ref 0–0.7)
EOSINOPHIL NFR BLD AUTO: 5.2 %
ERYTHROCYTE [DISTWIDTH] IN BLOOD BY AUTOMATED COUNT: 12.6 % (ref 11.5–14.5)
GLUCOSE SERPL-MCNC: 152 MG/DL (ref 74–99)
HCT VFR BLD AUTO: 40.7 % (ref 36–46)
HGB BLD-MCNC: 13.8 G/DL (ref 12–16)
IMM GRANULOCYTES # BLD AUTO: 0.03 X10*3/UL (ref 0–0.7)
IMM GRANULOCYTES NFR BLD AUTO: 0.4 % (ref 0–0.9)
LYMPHOCYTES # BLD AUTO: 1.45 X10*3/UL (ref 1.2–4.8)
LYMPHOCYTES NFR BLD AUTO: 17.6 %
MCH RBC QN AUTO: 32 PG (ref 26–34)
MCHC RBC AUTO-ENTMCNC: 33.9 G/DL (ref 32–36)
MCV RBC AUTO: 94 FL (ref 80–100)
MONOCYTES # BLD AUTO: 0.69 X10*3/UL (ref 0.1–1)
MONOCYTES NFR BLD AUTO: 8.4 %
NEUTROPHILS # BLD AUTO: 5.56 X10*3/UL (ref 1.2–7.7)
NEUTROPHILS NFR BLD AUTO: 67.5 %
NRBC BLD-RTO: 0 /100 WBCS (ref 0–0)
PCP UR QL SCN: NORMAL
PLATELET # BLD AUTO: 267 X10*3/UL (ref 150–450)
POC FINGERSTICK BLOOD GLUCOSE: 184 MG/DL (ref 70–100)
POC HEMOGLOBIN A1C: 7.3 % (ref 4.2–6.5)
POTASSIUM SERPL-SCNC: 4.6 MMOL/L (ref 3.5–5.3)
PROT SERPL-MCNC: 7.2 G/DL (ref 6.4–8.2)
RBC # BLD AUTO: 4.31 X10*6/UL (ref 4–5.2)
SODIUM SERPL-SCNC: 142 MMOL/L (ref 136–145)
TSH SERPL-ACNC: 2.2 MIU/L (ref 0.44–3.98)
WBC # BLD AUTO: 8.2 X10*3/UL (ref 4.4–11.3)

## 2024-02-13 PROCEDURE — 80373 DRUG SCREENING TRAMADOL: CPT

## 2024-02-13 PROCEDURE — 80358 DRUG SCREENING METHADONE: CPT

## 2024-02-13 PROCEDURE — 80361 OPIATES 1 OR MORE: CPT

## 2024-02-13 PROCEDURE — 85025 COMPLETE CBC W/AUTO DIFF WBC: CPT

## 2024-02-13 PROCEDURE — 80053 COMPREHEN METABOLIC PANEL: CPT

## 2024-02-13 PROCEDURE — 80365 DRUG SCREENING OXYCODONE: CPT

## 2024-02-13 PROCEDURE — 36415 COLL VENOUS BLD VENIPUNCTURE: CPT

## 2024-02-13 PROCEDURE — 80354 DRUG SCREENING FENTANYL: CPT

## 2024-02-13 PROCEDURE — 80368 SEDATIVE HYPNOTICS: CPT

## 2024-02-13 PROCEDURE — 83036 HEMOGLOBIN GLYCOSYLATED A1C: CPT | Performed by: FAMILY MEDICINE

## 2024-02-13 PROCEDURE — 82962 GLUCOSE BLOOD TEST: CPT | Performed by: FAMILY MEDICINE

## 2024-02-13 PROCEDURE — 80307 DRUG TEST PRSMV CHEM ANLYZR: CPT

## 2024-02-13 PROCEDURE — 80346 BENZODIAZEPINES1-12: CPT

## 2024-02-13 PROCEDURE — 82570 ASSAY OF URINE CREATININE: CPT

## 2024-02-13 PROCEDURE — 83704 LIPOPROTEIN BLD QUAN PART: CPT

## 2024-02-13 PROCEDURE — 84443 ASSAY THYROID STIM HORMONE: CPT

## 2024-02-13 PROCEDURE — 99214 OFFICE O/P EST MOD 30 MIN: CPT | Performed by: FAMILY MEDICINE

## 2024-02-13 RX ORDER — TIRZEPATIDE 2.5 MG/.5ML
2.5 INJECTION, SOLUTION SUBCUTANEOUS
Qty: 2 ML | Refills: 0 | Status: SHIPPED | OUTPATIENT
Start: 2024-02-13 | End: 2024-03-13

## 2024-02-13 RX ORDER — ZOLPIDEM TARTRATE 5 MG/1
5 TABLET ORAL NIGHTLY PRN
Qty: 30 TABLET | Refills: 2 | Status: CANCELLED | OUTPATIENT
Start: 2024-02-13 | End: 2024-05-13

## 2024-02-13 ASSESSMENT — ENCOUNTER SYMPTOMS
DYSURIA: 0
APPETITE CHANGE: 0
POLYDIPSIA: 0
RECTAL PAIN: 0
CONSTITUTIONAL NEGATIVE: 1
ADENOPATHY: 0
NERVOUS/ANXIOUS: 0
EYE PAIN: 0
TROUBLE SWALLOWING: 0
COUGH: 0
DIARRHEA: 0
MYALGIAS: 0
CONFUSION: 0
ABDOMINAL PAIN: 0
SPEECH DIFFICULTY: 0
RHINORRHEA: 0
STRIDOR: 0
COLOR CHANGE: 0
FEVER: 0
FLANK PAIN: 0
SEIZURES: 0
ARTHRALGIAS: 0
SINUS PRESSURE: 0
FATIGUE: 0
HEMATURIA: 0
BLOOD IN STOOL: 0
NECK STIFFNESS: 0
ACTIVITY CHANGE: 0
DYSPHORIC MOOD: 0
SORE THROAT: 0
SINUS PAIN: 0
DECREASED CONCENTRATION: 0
ABDOMINAL DISTENTION: 0
DIZZINESS: 0
AGITATION: 0
OCCASIONAL FEELINGS OF UNSTEADINESS: 0
SHORTNESS OF BREATH: 0
POLYPHAGIA: 0
HEADACHES: 0
LOSS OF SENSATION IN FEET: 0
PHOTOPHOBIA: 0
PALPITATIONS: 0
DEPRESSION: 0
CHEST TIGHTNESS: 0
CONSTIPATION: 0
SLEEP DISTURBANCE: 1

## 2024-02-13 ASSESSMENT — PATIENT HEALTH QUESTIONNAIRE - PHQ9
10. IF YOU CHECKED OFF ANY PROBLEMS, HOW DIFFICULT HAVE THESE PROBLEMS MADE IT FOR YOU TO DO YOUR WORK, TAKE CARE OF THINGS AT HOME, OR GET ALONG WITH OTHER PEOPLE: SOMEWHAT DIFFICULT
2. FEELING DOWN, DEPRESSED OR HOPELESS: SEVERAL DAYS
SUM OF ALL RESPONSES TO PHQ9 QUESTIONS 1 AND 2: 2
1. LITTLE INTEREST OR PLEASURE IN DOING THINGS: SEVERAL DAYS

## 2024-02-13 NOTE — PATIENT INSTRUCTIONS
Follow up in 3.5 months     Continue current medications and therapy for chronic medical conditions.    Patient was advised importance of proper diet/nutrition in addition adequate hydration. Patient was encouraged moderate exercise program to include 30 minutes daily for 5 days of the week or 150 minutes weekly. Patient will follow-up with us as scheduled.    I personally reviewed the OARRS report for this patient. I have considered the risks of abuse, dependence, addiction, and diversion.    UDS: 02/13/2024    In office Accu-Chek and hemoglobin A1c    Obtain Fasting lipid, CMP, CBC, TSH, NMR    Start Mounjaro 2.5mg once weekly

## 2024-02-13 NOTE — PROGRESS NOTES
Subjective   Patient ID: Benoit Chowdhury is a 69 y.o. female who presents for Diabetes.    Patient presents today to follow up on DM and HTN. Patient denies any chest pain, SOB, edema or headaches. Denies any episodes of low sugar. States she does have some dizziness at times with movement.     Patient would like to discuss starting mounjaro. She was on Jardiance and Trajenta in the past.     Patient does admit to slight depression over the past 2 weeks. States they just sold their house that they were in for over 43 years.          Review of Systems   Constitutional: Negative.  Negative for activity change, appetite change, fatigue and fever.   HENT:  Negative for congestion, dental problem, ear discharge, ear pain, mouth sores, rhinorrhea, sinus pressure, sinus pain, sore throat, tinnitus and trouble swallowing.    Eyes:  Negative for photophobia, pain and visual disturbance.   Respiratory:  Negative for cough, chest tightness, shortness of breath and stridor.    Cardiovascular:  Negative for chest pain and palpitations.   Gastrointestinal:  Negative for abdominal distention, abdominal pain, blood in stool, constipation, diarrhea and rectal pain.   Endocrine: Negative for cold intolerance, heat intolerance, polydipsia, polyphagia and polyuria.   Genitourinary:  Negative for dysuria, flank pain, hematuria and urgency.   Musculoskeletal:  Negative for arthralgias, gait problem, myalgias and neck stiffness.   Skin:  Negative for color change and rash.   Allergic/Immunologic: Negative for environmental allergies and food allergies.   Neurological:  Negative for dizziness, seizures, syncope, speech difficulty and headaches.   Hematological:  Negative for adenopathy.   Psychiatric/Behavioral:  Positive for sleep disturbance. Negative for agitation, confusion, decreased concentration and dysphoric mood. The patient is not nervous/anxious.        Objective   /64 (BP Location: Left arm, Patient Position: Sitting, BP Cuff  "Size: Adult)   Pulse 62   Temp 36.6 °C (97.9 °F)   Ht 1.575 m (5' 2\")   Wt 72.1 kg (159 lb)   LMP  (LMP Unknown)   SpO2 98%   BMI 29.08 kg/m²     Physical Exam  Vitals reviewed.   Constitutional:       General: She is not in acute distress.     Appearance: Normal appearance. She is normal weight. She is not ill-appearing or diaphoretic.   HENT:      Head: Normocephalic.      Right Ear: Tympanic membrane and external ear normal.      Left Ear: Tympanic membrane and external ear normal.      Nose: Nose normal. No congestion.      Mouth/Throat:      Pharynx: No posterior oropharyngeal erythema.   Eyes:      General:         Right eye: No discharge.         Left eye: No discharge.      Extraocular Movements: Extraocular movements intact.      Conjunctiva/sclera: Conjunctivae normal.      Pupils: Pupils are equal, round, and reactive to light.   Cardiovascular:      Rate and Rhythm: Normal rate and regular rhythm.      Pulses: Normal pulses.      Heart sounds: Normal heart sounds. No murmur heard.  Pulmonary:      Effort: Pulmonary effort is normal. No respiratory distress.      Breath sounds: Normal breath sounds. No wheezing or rales.   Chest:      Chest wall: No tenderness.   Abdominal:      General: Abdomen is flat. Bowel sounds are normal. There is no distension.      Palpations: There is no mass.      Tenderness: There is no abdominal tenderness. There is no guarding.   Musculoskeletal:         General: No tenderness. Normal range of motion.      Cervical back: Normal range of motion and neck supple. No tenderness.      Right lower leg: No edema.      Left lower leg: No edema.   Skin:     General: Skin is dry.      Coloration: Skin is not jaundiced.      Findings: No bruising, erythema or rash.   Neurological:      General: No focal deficit present.      Mental Status: She is alert and oriented to person, place, and time. Mental status is at baseline.      Cranial Nerves: No cranial nerve deficit.      " Sensory: No sensory deficit.      Coordination: Coordination normal.      Gait: Gait normal.   Psychiatric:         Mood and Affect: Mood normal.         Thought Content: Thought content normal.         Judgment: Judgment normal.         Assessment/Plan   Problem List Items Addressed This Visit             ICD-10-CM    Benign essential hypertension I10    DM type 2 with diabetic dyslipidemia (CMS/[facility]) - Primary E11.69, E78.5    Relevant Medications    tirzepatide (Mounjaro) 2.5 mg/0.5 mL pen injector    Other Relevant Orders    POCT fingerstick glucose manually resulted (Completed)    POCT glycosylated hemoglobin (Hb A1C) manually resulted (Completed)    Hyperlipidemia E78.5    Relevant Orders    Comprehensive Metabolic Panel (Completed)    Lipid Panel    CBC and Auto Differential (Completed)    Lipoprotein NMR    Hypothyroidism E03.9    Relevant Orders    Thyroid Stimulating Hormone (Completed)    Insomnia G47.00    Stage 3a chronic kidney disease (CMS/[facility]) N18.31    Status post below-knee amputation of left lower extremity (CMS/[facility]) Z89.512     stable         Invasive lobular carcinoma of breast in female (CMS/[facility]) C50.919     monitored         Congenital anomaly of spinal cord (CMS/[facility]) Q06.9     stable          Other Visit Diagnoses         Codes    Medication management     Z79.899    Relevant Orders    Opiate/Opioid/Benzo Prescription Compliance    OOB Internal Tracking              Scribe Attestation  By signing my name below, IBarbra RMA , Scribe   attest that this documentation has been prepared under the direction and in the presence of Cm Hanley DO.   Provider Attestation - Scribe documentation    All medical record entries made by the Scribe were at my direction and personally dictated by me. I have reviewed the chart and agree that the record accurately reflects my personal performance of the history, physical exam, discussion and plan.

## 2024-02-14 RX ORDER — ZOLPIDEM TARTRATE 5 MG/1
5 TABLET ORAL NIGHTLY PRN
Qty: 30 TABLET | Refills: 2 | Status: SHIPPED | OUTPATIENT
Start: 2024-02-14

## 2024-02-14 RX ORDER — TRAMADOL HYDROCHLORIDE 50 MG/1
50 TABLET ORAL EVERY 6 HOURS PRN
Qty: 30 TABLET | Refills: 0 | Status: SHIPPED | OUTPATIENT
Start: 2024-02-14

## 2024-02-16 LAB
CHOLEST SERPL-MCNC: 220 MG/DL (ref 100–199)
HDL SERPL-SCNC: 33.8 UMOL/L
HDLC SERPL-MCNC: 48 MG/DL
LDL SERPL QN: 20.1 NM
LDL SERPL-SCNC: 1483 NMOL/L
LDL SMALL SERPL-SCNC: 843 NMOL/L
LDLC SERPL CALC-MCNC: 108 MG/DL (ref 0–99)
LP-IR SCORE SERPL: 76
TRIGL SERPL-MCNC: 374 MG/DL (ref 0–149)

## 2024-03-04 DIAGNOSIS — E55.9 VITAMIN D DEFICIENCY: ICD-10-CM

## 2024-03-04 RX ORDER — ERGOCALCIFEROL 1.25 MG/1
1 CAPSULE ORAL
Qty: 12 CAPSULE | Refills: 1 | Status: SHIPPED | OUTPATIENT
Start: 2024-03-04

## 2024-03-13 DIAGNOSIS — E11.69 DM TYPE 2 WITH DIABETIC DYSLIPIDEMIA (MULTI): ICD-10-CM

## 2024-03-13 DIAGNOSIS — E78.5 DM TYPE 2 WITH DIABETIC DYSLIPIDEMIA (MULTI): ICD-10-CM

## 2024-03-13 RX ORDER — TIRZEPATIDE 2.5 MG/.5ML
INJECTION, SOLUTION SUBCUTANEOUS
Qty: 2 ML | Refills: 0 | Status: SHIPPED | OUTPATIENT
Start: 2024-03-13 | End: 2024-05-10

## 2024-03-27 DIAGNOSIS — E11.69 DM TYPE 2 WITH DIABETIC DYSLIPIDEMIA (MULTI): ICD-10-CM

## 2024-03-27 DIAGNOSIS — E78.5 DM TYPE 2 WITH DIABETIC DYSLIPIDEMIA (MULTI): ICD-10-CM

## 2024-03-27 NOTE — TELEPHONE ENCOUNTER
Pt calling about mounjaro, it is on back order at all pharmacies. She says this has been helping a lot since starting it- what can she do in the mean time? Please advise.

## 2024-03-31 DIAGNOSIS — I10 BENIGN ESSENTIAL HYPERTENSION: ICD-10-CM

## 2024-03-31 DIAGNOSIS — F32.9 MAJOR DEPRESSIVE DISORDER, REMISSION STATUS UNSPECIFIED, UNSPECIFIED WHETHER RECURRENT: ICD-10-CM

## 2024-04-01 RX ORDER — SERTRALINE HYDROCHLORIDE 50 MG/1
50 TABLET, FILM COATED ORAL DAILY
Qty: 90 TABLET | Refills: 1 | Status: SHIPPED | OUTPATIENT
Start: 2024-04-01

## 2024-04-01 RX ORDER — METOPROLOL SUCCINATE 25 MG/1
25 TABLET, EXTENDED RELEASE ORAL DAILY
Qty: 90 TABLET | Refills: 1 | Status: SHIPPED | OUTPATIENT
Start: 2024-04-01

## 2024-04-09 DIAGNOSIS — E03.9 ACQUIRED HYPOTHYROIDISM: ICD-10-CM

## 2024-04-09 DIAGNOSIS — E78.2 MIXED HYPERLIPIDEMIA: ICD-10-CM

## 2024-04-09 RX ORDER — SIMVASTATIN 10 MG/1
10 TABLET, FILM COATED ORAL NIGHTLY
Qty: 90 TABLET | Refills: 1 | Status: SHIPPED | OUTPATIENT
Start: 2024-04-09

## 2024-04-09 RX ORDER — LEVOTHYROXINE, LIOTHYRONINE 19; 4.5 UG/1; UG/1
30 TABLET ORAL DAILY
Qty: 90 TABLET | Refills: 1 | Status: SHIPPED | OUTPATIENT
Start: 2024-04-09

## 2024-04-15 ENCOUNTER — PATIENT MESSAGE (OUTPATIENT)
Dept: PRIMARY CARE | Facility: CLINIC | Age: 69
End: 2024-04-15
Payer: MEDICARE

## 2024-04-15 DIAGNOSIS — E78.5 DM TYPE 2 WITH DIABETIC DYSLIPIDEMIA (MULTI): ICD-10-CM

## 2024-04-15 DIAGNOSIS — E11.69 DM TYPE 2 WITH DIABETIC DYSLIPIDEMIA (MULTI): ICD-10-CM

## 2024-04-16 NOTE — TELEPHONE ENCOUNTER
From: Alejandra Chowdhury  To: Cm Hanley  Sent: 4/15/2024 8:26 PM EDT  Subject: Ozempic    Now that I have been using ozempic in place of the monjaro, my blood glucose is not staying down. Could I increase the dose or do you think I can get monjaro from [facility]? Monjaro worked great. I was loosing weight and my glucose was down to 109. Can you please let me know what I can do. Thank you Benoit Chowdhury

## 2024-04-29 DIAGNOSIS — Q06.9 CONGENITAL ANOMALY OF SPINAL CORD (MULTI): ICD-10-CM

## 2024-04-29 NOTE — PROGRESS NOTES
Patient ID: Benoit Chowdhury is a 69 y.o. female.    The patient presents to clinic today for her history of breast cancer.     Cancer Staging   Invasive lobular carcinoma of breast in female (Multi)  Staging form: Breast, AJCC 8th Edition  - Clinical stage from 3/22/2023: Stage IA (cT1c, cN0, cM0, G2, ER+, NC+, HER2-) - Unsigned        Diagnostic/Therapeutic History:  - On 1/18/2023 she had screening mammogram that showed a spiculated mass in the upper outer right breast, bilateral benign similar-appearing circumscribed and obscured  masses are noted.  No suspicious masses or calcification of the left breast.  BI-RADS Category 0     - right breast rash in January 2023     -On 2/2/2023 she had targeted ultrasound of the right breast that showedat 10:00, 9 cm from the nipple a 0.7 x 0.5 x 0.8 cm irregular hypoechoic mass which corresponds to the mass seen on mammogram, 7 morphologically normal lymph nodes identified the  right axilla      -On 2/8/2023 she had right breast ultrasound-guided core biopsy that showed invasive lobular carcinoma, grade 2 ER 95%, NC 95%, HER2 negative 1+.  Patient also had lobular carcinoma in situ, MammaPrint index +0.624, low risk luminal type a     -On 3/22/2023, she underwent right partial mastectomy with sentinel lymph node biopsy (0/1) that showed invasive lobular carcinoma 12 mm in greatest dimension, grade 2 with negative margins staged as a PT1CN0 ER 95%, NC 95%, HER2 negative 1+, did have  LCIS      - 5/1/23- 5/26/23: completed radiation     History of Present Illness (HPI)/Interval History:  Ms. Chowdhury presents for presents today for follow-up and surveillance.     She denies any new breast cancer concerns. She did have a bx in Jan 2024 due to new post-op changes on mammogram and US, found to be fat necrosis and hemosiderin laden macrophages.     She denies any chest pain or breathing issues.     She denies any vision changes, dizziness, loss of balance.  Hx of migraines, manageable  headaches. No recent falls.     She denies any new or unexplained bone aches or pains. Baseline arthritic pain.     She denies any skin lesions or masses.    She reports a normal appetite and normal bowel movements.    She has a hx of difficulties with sleep however not been requiring Ambien. Hot flashes are much improved off treatment.     She has been on high dose vit d weekly for a number of years.     Review of Systems:  14-point ROS otherwise negative, as per HPI.    Past Medical History:   Diagnosis Date    Acute gastritis with bleeding 04/10/2021    Acute superficial gastritis with hemorrhage    Allergic contact dermatitis, unspecified cause 05/22/2022    Allergic dermatitis    Cutaneous abscess of groin 10/04/2021    Abscess of groin    Cutaneous abscess of left axilla 10/20/2021    Abscess of axilla, left    Disorder of kidney and ureter, unspecified 10/25/2021    Low kidney function    Effusion, right foot 03/18/2021    Swelling of first metatarsophalangeal (MTP) joint of right foot    Encounter for general adult medical examination without abnormal findings 02/08/2021    Encounter for Medicare annual wellness exam    Encounter for general adult medical examination without abnormal findings 05/18/2022    Encounter for Medicare annual wellness exam    Encounter for other screening for malignant neoplasm of breast     Breast cancer screening    Encounter for screening for malignant neoplasm of colon 09/23/2021    Encounter for colorectal cancer screening    Nonspecific lymphadenitis, unspecified 10/20/2021    Axillary adenitis    Otitis media, unspecified, unspecified ear 01/09/2020    Ear infection    Pain in right toe(s) 03/18/2021    Pain of right great toe    Pain in unspecified joint 05/08/2020    Multiple joint pain    Personal history of diseases of the skin and subcutaneous tissue 10/20/2021    History of folliculitis    Personal history of diseases of the skin and subcutaneous tissue 07/22/2020     History of acute dermatitis    Personal history of diseases of the skin and subcutaneous tissue 2020    History of skin pruritus    Personal history of other drug therapy 2021    History of influenza vaccination    Personal history of other drug therapy 2020    History of influenza vaccination    Personal history of other endocrine, nutritional and metabolic disease 2021    History of hyperglycemia    Personal history of other specified conditions 2022    History of diarrhea    Personal history of other specified conditions 2022    History of vomiting    Rash and other nonspecific skin eruption 2020    Rash in adult    Rash and other nonspecific skin eruption 2021    Rash    Rash and other nonspecific skin eruption 2022    Rash       Past Surgical History:   Procedure Laterality Date    OTHER SURGICAL HISTORY  2020    Hysterectomy    OTHER SURGICAL HISTORY  2020     section    OTHER SURGICAL HISTORY  2020    Cholelithotomy    OTHER SURGICAL HISTORY  2020    Hernia repair    OTHER SURGICAL HISTORY  2020    Lower extremity amputation    OTHER SURGICAL HISTORY  2022    Ventral hernia repair    OTHER SURGICAL HISTORY  2022    Colonoscopy       Social History     Socioeconomic History    Marital status:      Spouse name: Not on file    Number of children: Not on file    Years of education: Not on file    Highest education level: Not on file   Occupational History    Not on file   Tobacco Use    Smoking status: Never     Passive exposure: Never    Smokeless tobacco: Never   Substance and Sexual Activity    Alcohol use: Not Currently    Drug use: Never    Sexual activity: Not on file   Other Topics Concern    Not on file   Social History Narrative    Not on file     Social Determinants of Health     Financial Resource Strain: Low Risk  (2024)    [facility]    Overall Financial Resource Strain  (CARDIA)     Difficulty of Paying Living Expenses: Not very hard   Food Insecurity: No Food Insecurity (2/1/2024)    [facility]    Hunger Vital Sign     Worried About Running Out of Food in the Last Year: Never true     Ran Out of Food in the Last Year: Never true   Transportation Needs: No Transportation Needs (2/1/2024)    Received from [facility]    PRAPARE - Transportation     Lack of Transportation (Medical): No     Lack of Transportation (Non-Medical): No   Physical Activity: Insufficiently Active (2/1/2024)    Received from [facility]    Exercise Vital Sign     Days of Exercise per Week: 1 day     Minutes of Exercise per Session: 10 min   Stress: Stress Concern Present (2/1/2024)    Received from [facility]    [employer] [place] [employer] - Occupational Stress Questionnaire     Feeling of Stress : Very much   Social Connections: Moderately Integrated (2/1/2024)    Received from [facility]    Social Connection and Isolation Panel [NHANES]     Frequency of Communication with Friends and Family: Three times a week     Frequency of Social Gatherings with Friends and Family: Once a week     Attends [employer] Services: 1 to 4 times per year     Active Member of Clubs or Organizations: No     Attends Club or Organization Meetings: Never     Marital Status:    Intimate Partner Violence: Not At Risk (2/1/2024)    Received from [facility]    Humiliation, Afraid, Rape, and Kick questionnaire     Fear of Current or Ex-Partner: No     Emotionally Abused: No     Physically Abused: No     Sexually Abused: No   Housing Stability: Low Risk  (2/1/2024)    Received from [facility]    Housing Stability Vital Sign     Unable to Pay for Housing in the Last Year: No     Number of Places Lived in the Last Year: 1     Unstable Housing in the Last Year: No       Allergies   Allergen Reactions    Iodinated Contrast Media Unknown    Iodine Nausea Only and Rash         Current Outpatient  Medications:     blood sugar diagnostic (Blood Glucose Test) strip, USE as DIRECTED, Disp: 100 strip, Rfl: 0    clobetasol (Temovate) 0.05 % cream, apply to affected area twice a day ON BODY UNTIL CLEAR, AVOID USING ON FACE, GROIN, AND UNDERARMS, Disp: , Rfl:     ergocalciferol (Vitamin D-2) 1.25 MG (55291 UT) capsule, take 1 capsule by mouth every week, Disp: 12 capsule, Rfl: 1    fluocinolone-hydroq.-tretinoin (Tri-Alexa) 0.01-4-0.05 % cream, 1 Application, Disp: , Rfl:     gabapentin (Neurontin) 600 mg tablet, take 1 tablet by mouth twice a day, Disp: 180 tablet, Rfl: 0    losartan (Cozaar) 25 mg tablet, take 1 tablet by mouth once daily, Disp: 90 tablet, Rfl: 1    metoprolol succinate XL (Toprol-XL) 25 mg 24 hr tablet, take 1 tablet by mouth once daily DO NOT CRUSH OR CHEW, Disp: 90 tablet, Rfl: 1    Mounjaro 2.5 mg/0.5 mL pen injector, inject 2 & 1/2 milligrams subcutaneously every week, Disp: 2 mL, Rfl: 0    NP Thyroid 30 mg tablet, take 1 tablet by mouth once daily, Disp: 90 tablet, Rfl: 1    semaglutide 0.25 mg or 0.5 mg (2 mg/3 mL) pen injector, Inject 0.25 mg under the skin 1 (one) time per week., Disp: 3 mL, Rfl: 0    semaglutide 0.25 mg or 0.5 mg (2 mg/3 mL) pen injector, Inject 0.5 mg under the skin 1 (one) time per week., Disp: 3 mL, Rfl: 0    sertraline (Zoloft) 50 mg tablet, take 1 tablet by mouth once daily, Disp: 90 tablet, Rfl: 1    simvastatin (Zocor) 10 mg tablet, take 1 tablet by mouth at bedtime, Disp: 90 tablet, Rfl: 1    traMADol (Ultram) 50 mg tablet, Take 1 tablet (50 mg) by mouth every 6 hours if needed for severe pain (7 - 10)., Disp: 30 tablet, Rfl: 0    triamcinolone (Kenalog) 0.1 % cream, Apply topically 2 times a day. Apply to affected area 1-2 times daily as needed., Disp: 15 g, Rfl: 0    zolpidem (Ambien) 5 mg tablet, Take 1 tablet (5 mg) by mouth as needed at bedtime for sleep., Disp: 30 tablet, Rfl: 2    predniSONE (Deltasone) 10 mg tablet, TAKE 3 FOR 4 DAYS, 2 FOR 4 DAYS, AND 1  UNTIL SONE (Patient not taking: Reported on 10/24/2023), Disp: 25 tablet, Rfl: 0     Objective    BSA: 1.72 meters squared  /72 (BP Location: Left arm, Patient Position: Sitting)   Pulse 78   Temp 36.3 °C (97.3 °F) (Temporal)   Resp 16   Wt 67.8 kg (149 lb 8 oz)   LMP  (LMP Unknown)   SpO2 96%   BMI 27.34 kg/m²     Performance Status:  The ECOG performance scale today is ECO- Fully active, able to carry on all pre-disease performance w/o restriction.    Physical Exam  Vitals reviewed.   Constitutional:       General: She is awake. She is not in acute distress.     Appearance: Normal appearance. She is not ill-appearing.   HENT:      Mouth/Throat:      Pharynx: Oropharynx is clear. No oropharyngeal exudate.   Eyes:      General: No scleral icterus.     Conjunctiva/sclera: Conjunctivae normal.   Neck:      Trachea: Trachea and phonation normal. No tracheal tenderness.   Cardiovascular:      Rate and Rhythm: Normal rate and regular rhythm.      Heart sounds: No murmur heard.     No friction rub. No gallop.   Pulmonary:      Effort: Pulmonary effort is normal. No respiratory distress.      Breath sounds: Normal breath sounds. No stridor. No wheezing, rhonchi or rales.   Chest:      Chest wall: No mass, lacerations, deformity or tenderness.   Breasts:     Right: Skin change (due to xrt and surgery) present. No swelling, mass, nipple discharge or tenderness.      Left: No swelling, mass, nipple discharge, skin change or tenderness.   Abdominal:      General: Abdomen is flat. There is no distension.      Palpations: Abdomen is soft. There is no mass.      Tenderness: There is no abdominal tenderness.   Lymphadenopathy:      Cervical: No cervical adenopathy.      Upper Body:      Right upper body: No supraclavicular, axillary or pectoral adenopathy.      Left upper body: No supraclavicular, axillary or pectoral adenopathy.   Skin:     General: Skin is warm and dry.      Coloration: Skin is not jaundiced.       Findings: No lesion or rash.   Neurological:      General: No focal deficit present.      Mental Status: She is alert and oriented to person, place, and time.      Motor: No weakness.      Gait: Gait normal.   Psychiatric:         Mood and Affect: Mood normal.         Thought Content: Thought content normal.         Judgment: Judgment normal.         Laboratory Data:  Lab Results   Component Value Date    WBC 8.2 02/13/2024    HGB 13.8 02/13/2024    HCT 40.7 02/13/2024    MCV 94 02/13/2024     02/13/2024       Chemistry    Lab Results   Component Value Date/Time     02/13/2024 0946    K 4.6 02/13/2024 0946     02/13/2024 0946    CO2 29 02/13/2024 0946    BUN 25 (H) 02/13/2024 0946    CREATININE 1.23 (H) 02/13/2024 0946    Lab Results   Component Value Date/Time    CALCIUM 9.7 02/13/2024 0946    ALKPHOS 79 02/13/2024 0946    AST 24 02/13/2024 0946    ALT 23 02/13/2024 0946    BILITOT 0.5 02/13/2024 0946             Radiology:  US renal complete  Narrative: Interpreted By:  Sumi Barron,   STUDY:  US RENAL COMPLETE;  5/1/2024 9:33 am      INDICATION:  N18.31 Chronic kidney disease, stage 3a .      COMPARISON:  10/03/2023.      ACCESSION NUMBER(S):  [id number]      ORDERING CLINICIAN:  SEBAS ESPOSITO      TECHNIQUE:  Multiple images of the kidneys were obtained  .      FINDINGS:  RIGHT KIDNEY:  The right kidney measures 10.3 in length. Lobulated contour.  Duplicated collecting system versus prominent column of [place] noted.  The renal cortical echogenicity and thickness are within normal  limits. No hydronephrosis is present; no evidence of nephrolithiasis.  Anechoic 6 mm cyst mid kidney.      LEFT KIDNEY:  The left kidney measures 9.6 in length. The renal cortical  echogenicity and thickness are within normal limits. No  hydronephrosis is present; no evidence of nephrolithiasis. Several  anechoic cysts are present, largest is superior pole 2.1 cm. A cyst  with a single thin septation is seen in the  mid kidney 1.8 cm.      BLADDER:  Normal contour. Ureteral jets are not observed.      Incidentally noted increased hepatic parenchymal echogenicity.      Impression: No hydronephrosis. Kidneys are normal in size. Few bilateral cysts  present.      Increased liver parenchymal echogenicity suggests steatosis.      MACRO:  None      Signed by: Sumi Barron 5/2/2024 6:28 PM  Dictation workstation:   [id number]       BI MAMMO BILATERAL SCREENING TOMOSYNTHESIS     Narrative  MRN: [id number]  Patient Name: ED CHOWDHURY    STUDY:  DIGITAL MAMM SCREENING W/ MARIIA;  1/18/2023 11:50 am    ACCESSION NUMBER(S):  [id number]    ORDERING CLINICIAN:  BHAVYA WILLOUGHBY    INDICATION:  Screening. Benign right core biopsy. Right breast itching.    COMPARISON:  02/11/2021, 11/06/2017, 07/28/2015    FINDINGS:  2D and tomosynthesis images were reviewed at 1 mm slice thickness.    There are areas of scattered fibroglandular tissue.  There is a  spiculated mass in the upper outer right breast at middle depth.  Bilateral benign similar-appearing circumscribed and obscured masses  are noted. No suspicious masses or calcifications are identified in  the left breast.    IMPRESSION:  1. Right breast spiculated mass. Targeted ultrasound is recommended.  2. Right breast itching. Clinical correlation is recommended. There  is a focal area of concern, targeted ultrasound is recommended.  3. No mammographic evidence of malignancy in the left breast.    BI-RADS CATEGORY:    Category: 0 - Incomplete; Need Additional Imaging Evaluation and/or  Prior Mammograms for Comparison.  Recommendation: Ultrasound Recommended.    For any future breast imaging appointments, please call [phone]).    Patient letter sent SADEVAL      Electronically signed by: SCOTT HA MD          Assessment/Plan:    Ed Chowdhury is a 69 y.o. female with a history of right ILC breast cancer, who presents today follow-up evaluation.    Invasive Lobular Carcinoma    Due to side effects from anastrozole and exemestane (kidney dysfunction) on observation only   - Mammogram due next after 1/23/2025, will order next OV     There is no evidence of breast cancer recurrence based on her clinical exam today.       Assess/Plan SmartLinks:   Problem List Items Addressed This Visit             ICD-10-CM    Invasive lobular carcinoma of breast in female (Multi) C50.919    Relevant Orders    Clinic Appointment Request PREETI SKINNER; [facility] [id number]         Disposition.  - RTC Nov 2024 with Preeti Skinner PA-C   - She was given our contact information and instructed to call with concerns/questions in the interim.    No orders of the defined types were placed in this encounter.     Preeti Skinner PA-C  Hematology and Medical Oncology  [facility]

## 2024-05-01 ENCOUNTER — HOSPITAL ENCOUNTER (OUTPATIENT)
Dept: RADIOLOGY | Facility: HOSPITAL | Age: 69
Discharge: HOME | End: 2024-05-01
Payer: MEDICARE

## 2024-05-01 DIAGNOSIS — N18.31 CHRONIC KIDNEY DISEASE, STAGE 3A (MULTI): ICD-10-CM

## 2024-05-01 DIAGNOSIS — I10 BENIGN ESSENTIAL HYPERTENSION: ICD-10-CM

## 2024-05-01 PROCEDURE — 76770 US EXAM ABDO BACK WALL COMP: CPT | Mod: LIO,ONBASE

## 2024-05-01 PROCEDURE — 76770 US EXAM ABDO BACK WALL COMP: CPT | Mod: LIO | Performed by: RADIOLOGY

## 2024-05-02 ENCOUNTER — HOSPITAL ENCOUNTER (OUTPATIENT)
Dept: LAB | Age: 69
Discharge: HOME | End: 2024-05-02

## 2024-05-02 LAB
ALBUMIN SERPL-MCNC: 4 G/DL (ref 3.5–4.6)
ANION GAP SERPL CALCULATED.3IONS-SCNC: 14 MEQ/L (ref 9–15)
BUN SERPL-MCNC: 24 MG/DL (ref 8–23)
CALCIUM SERPL-MCNC: 10.2 MG/DL (ref 8.5–9.9)
CHLORIDE SERPL-SCNC: 103 MEQ/L (ref 95–107)
CO2 SERPL-SCNC: 25 MEQ/L (ref 20–31)
CREAT SERPL-MCNC: 1.33 MG/DL (ref 0.5–0.9)
CREAT UR-MCNC: 193 MG/DL
ERYTHROCYTE [DISTWIDTH] IN BLOOD BY AUTOMATED COUNT: 13.1 % (ref 11.5–14.5)
GLUCOSE SERPL-MCNC: 92 MG/DL (ref 70–99)
HCT VFR BLD AUTO: 40.1 % (ref 37–47)
HGB BLD-MCNC: 13.3 G/DL (ref 12–16)
MCH RBC QN AUTO: 31 PG (ref 27–31.3)
MCHC RBC AUTO-ENTMCNC: 33.2 % (ref 33–37)
MCV RBC AUTO: 93.5 FL (ref 79.4–94.8)
MICROALBUMIN UR-MCNC: 4 MG/DL
MICROALBUMIN/CREAT UR-RTO: 20.7 MG/G (ref 0–30)
PHOSPHATE SERPL-MCNC: 4.5 MG/DL (ref 2.3–4.8)
PLATELET # BLD AUTO: 245 K/UL (ref 130–400)
POTASSIUM SERPL-SCNC: 4.2 MEQ/L (ref 3.4–4.9)
RBC # BLD AUTO: 4.29 M/UL (ref 4.2–5.4)
SODIUM SERPL-SCNC: 142 MEQ/L (ref 135–144)
TSH SERPL-MCNC: 1.82 UIU/ML (ref 0.44–3.86)
VITAMIN D 25-HYDROXY: 61.6 NG/ML (ref 30–100)
WBC # BLD AUTO: 7.1 K/UL (ref 4.8–10.8)

## 2024-05-02 RX ORDER — LOSARTAN POTASSIUM 25 MG/1
25 TABLET ORAL DAILY
Qty: 90 TABLET | Refills: 1 | Status: SHIPPED | OUTPATIENT
Start: 2024-05-02

## 2024-05-04 LAB
COMMENT: ABNORMAL
MISCELLANEOUS LAB TEST RESULT: ABNORMAL

## 2024-05-04 RX ORDER — GABAPENTIN 600 MG/1
600 TABLET ORAL 2 TIMES DAILY
Qty: 180 TABLET | Refills: 0 | Status: SHIPPED | OUTPATIENT
Start: 2024-05-04

## 2024-05-07 ENCOUNTER — OFFICE VISIT (OUTPATIENT)
Dept: HEMATOLOGY/ONCOLOGY | Facility: CLINIC | Age: 69
End: 2024-05-07
Payer: MEDICARE

## 2024-05-07 VITALS
HEART RATE: 78 BPM | TEMPERATURE: 97.3 F | SYSTOLIC BLOOD PRESSURE: 118 MMHG | BODY MASS INDEX: 27.34 KG/M2 | WEIGHT: 149.5 LBS | RESPIRATION RATE: 16 BRPM | DIASTOLIC BLOOD PRESSURE: 72 MMHG | OXYGEN SATURATION: 96 %

## 2024-05-07 DIAGNOSIS — C50.919 INVASIVE LOBULAR CARCINOMA OF BREAST IN FEMALE (MULTI): ICD-10-CM

## 2024-05-07 PROCEDURE — 3060F POS MICROALBUMINURIA REV: CPT

## 2024-05-07 PROCEDURE — 1157F ADVNC CARE PLAN IN RCRD: CPT

## 2024-05-07 PROCEDURE — 1126F AMNT PAIN NOTED NONE PRSNT: CPT

## 2024-05-07 PROCEDURE — 1160F RVW MEDS BY RX/DR IN RCRD: CPT

## 2024-05-07 PROCEDURE — 3078F DIAST BP <80 MM HG: CPT

## 2024-05-07 PROCEDURE — 99213 OFFICE O/P EST LOW 20 MIN: CPT

## 2024-05-07 PROCEDURE — 1159F MED LIST DOCD IN RCRD: CPT

## 2024-05-07 PROCEDURE — 3074F SYST BP LT 130 MM HG: CPT

## 2024-05-07 PROCEDURE — 4010F ACE/ARB THERAPY RXD/TAKEN: CPT

## 2024-05-07 ASSESSMENT — PAIN SCALES - GENERAL: PAINLEVEL: 0-NO PAIN

## 2024-05-09 DIAGNOSIS — E78.5 DM TYPE 2 WITH DIABETIC DYSLIPIDEMIA (MULTI): ICD-10-CM

## 2024-05-09 DIAGNOSIS — E11.69 DM TYPE 2 WITH DIABETIC DYSLIPIDEMIA (MULTI): ICD-10-CM

## 2024-05-10 RX ORDER — TIRZEPATIDE 2.5 MG/.5ML
INJECTION, SOLUTION SUBCUTANEOUS
Qty: 2 ML | Refills: 0 | Status: SHIPPED | OUTPATIENT
Start: 2024-05-10 | End: 2024-06-06

## 2024-05-31 ENCOUNTER — HOSPITAL ENCOUNTER (OUTPATIENT)
Dept: LAB | Age: 69
Discharge: HOME | End: 2024-05-31
Payer: MEDICARE

## 2024-05-31 LAB
ALBUMIN SERPL-MCNC: 4.2 G/DL (ref 3.5–4.6)
ANION GAP SERPL CALCULATED.3IONS-SCNC: 14 MEQ/L (ref 9–15)
BUN SERPL-MCNC: 29 MG/DL (ref 8–23)
CALCIUM SERPL-MCNC: 9.3 MG/DL (ref 8.5–9.9)
CHLORIDE SERPL-SCNC: 104 MEQ/L (ref 95–107)
CO2 SERPL-SCNC: 24 MEQ/L (ref 20–31)
CREAT SERPL-MCNC: 1.38 MG/DL (ref 0.5–0.9)
GLUCOSE SERPL-MCNC: 98 MG/DL (ref 70–99)
PHOSPHATE SERPL-MCNC: 4.3 MG/DL (ref 2.3–4.8)
POTASSIUM SERPL-SCNC: 4.1 MEQ/L (ref 3.4–4.9)
SODIUM SERPL-SCNC: 142 MEQ/L (ref 135–144)

## 2024-05-31 PROCEDURE — 80069 RENAL FUNCTION PANEL: CPT

## 2024-05-31 PROCEDURE — 36415 COLL VENOUS BLD VENIPUNCTURE: CPT

## 2024-06-06 DIAGNOSIS — E11.69 DM TYPE 2 WITH DIABETIC DYSLIPIDEMIA (MULTI): ICD-10-CM

## 2024-06-06 DIAGNOSIS — E78.5 DM TYPE 2 WITH DIABETIC DYSLIPIDEMIA (MULTI): ICD-10-CM

## 2024-06-06 RX ORDER — TIRZEPATIDE 2.5 MG/.5ML
INJECTION, SOLUTION SUBCUTANEOUS
Qty: 2 ML | Refills: 0 | Status: SHIPPED | OUTPATIENT
Start: 2024-06-06

## 2024-06-07 DIAGNOSIS — E11.69 DM TYPE 2 WITH DIABETIC DYSLIPIDEMIA (MULTI): ICD-10-CM

## 2024-06-07 DIAGNOSIS — E78.5 DM TYPE 2 WITH DIABETIC DYSLIPIDEMIA (MULTI): ICD-10-CM

## 2024-06-07 RX ORDER — CALCIUM CITRATE/VITAMIN D3 200MG-6.25
1 TABLET ORAL 3 TIMES DAILY
Qty: 100 STRIP | Refills: 3 | Status: SHIPPED | OUTPATIENT
Start: 2024-06-07

## 2024-06-13 RX ORDER — DAPAGLIFLOZIN 10 MG/1
TABLET, FILM COATED ORAL
COMMUNITY
Start: 2024-06-05

## 2024-06-14 ENCOUNTER — APPOINTMENT (OUTPATIENT)
Dept: PRIMARY CARE | Facility: CLINIC | Age: 69
End: 2024-06-14
Payer: MEDICARE

## 2024-06-14 ENCOUNTER — HOSPITAL ENCOUNTER (OUTPATIENT)
Dept: RADIOLOGY | Facility: CLINIC | Age: 69
Discharge: HOME | End: 2024-06-14
Payer: MEDICARE

## 2024-06-14 VITALS
OXYGEN SATURATION: 96 % | HEIGHT: 62 IN | BODY MASS INDEX: 27.42 KG/M2 | SYSTOLIC BLOOD PRESSURE: 110 MMHG | WEIGHT: 149 LBS | DIASTOLIC BLOOD PRESSURE: 50 MMHG | HEART RATE: 71 BPM | TEMPERATURE: 97.2 F | RESPIRATION RATE: 16 BRPM

## 2024-06-14 DIAGNOSIS — M25.551 PAIN OF RIGHT HIP: ICD-10-CM

## 2024-06-14 DIAGNOSIS — E11.69 DM TYPE 2 WITH DIABETIC DYSLIPIDEMIA (MULTI): ICD-10-CM

## 2024-06-14 DIAGNOSIS — M54.50 PAIN IN LEFT LUMBAR REGION OF BACK: ICD-10-CM

## 2024-06-14 DIAGNOSIS — Z00.00 MEDICARE ANNUAL WELLNESS VISIT, SUBSEQUENT: Primary | ICD-10-CM

## 2024-06-14 DIAGNOSIS — Z00.00 ROUTINE GENERAL MEDICAL EXAMINATION AT HEALTH CARE FACILITY: ICD-10-CM

## 2024-06-14 DIAGNOSIS — M15.9 GENERALIZED OSTEOARTHROSIS: ICD-10-CM

## 2024-06-14 DIAGNOSIS — Q06.9 CONGENITAL ANOMALY OF SPINAL CORD (MULTI): ICD-10-CM

## 2024-06-14 DIAGNOSIS — E78.5 DM TYPE 2 WITH DIABETIC DYSLIPIDEMIA (MULTI): ICD-10-CM

## 2024-06-14 LAB
POC FINGERSTICK BLOOD GLUCOSE: 141 MG/DL (ref 70–100)
POC HEMOGLOBIN A1C: 5.4 % (ref 4.2–6.5)

## 2024-06-14 PROCEDURE — 82962 GLUCOSE BLOOD TEST: CPT | Performed by: FAMILY MEDICINE

## 2024-06-14 PROCEDURE — 1157F ADVNC CARE PLAN IN RCRD: CPT | Performed by: FAMILY MEDICINE

## 2024-06-14 PROCEDURE — 1160F RVW MEDS BY RX/DR IN RCRD: CPT | Performed by: FAMILY MEDICINE

## 2024-06-14 PROCEDURE — 3060F POS MICROALBUMINURIA REV: CPT | Performed by: FAMILY MEDICINE

## 2024-06-14 PROCEDURE — 1036F TOBACCO NON-USER: CPT | Performed by: FAMILY MEDICINE

## 2024-06-14 PROCEDURE — 1170F FXNL STATUS ASSESSED: CPT | Performed by: FAMILY MEDICINE

## 2024-06-14 PROCEDURE — 3078F DIAST BP <80 MM HG: CPT | Performed by: FAMILY MEDICINE

## 2024-06-14 PROCEDURE — G0439 PPPS, SUBSEQ VISIT: HCPCS | Performed by: FAMILY MEDICINE

## 2024-06-14 PROCEDURE — 73502 X-RAY EXAM HIP UNI 2-3 VIEWS: CPT | Mod: RT

## 2024-06-14 PROCEDURE — 3074F SYST BP LT 130 MM HG: CPT | Performed by: FAMILY MEDICINE

## 2024-06-14 PROCEDURE — 72100 X-RAY EXAM L-S SPINE 2/3 VWS: CPT

## 2024-06-14 PROCEDURE — 99497 ADVNCD CARE PLAN 30 MIN: CPT | Performed by: FAMILY MEDICINE

## 2024-06-14 PROCEDURE — 1159F MED LIST DOCD IN RCRD: CPT | Performed by: FAMILY MEDICINE

## 2024-06-14 PROCEDURE — 4010F ACE/ARB THERAPY RXD/TAKEN: CPT | Performed by: FAMILY MEDICINE

## 2024-06-14 PROCEDURE — 83036 HEMOGLOBIN GLYCOSYLATED A1C: CPT | Performed by: FAMILY MEDICINE

## 2024-06-14 RX ORDER — TRAMADOL HYDROCHLORIDE 50 MG/1
50 TABLET ORAL EVERY 6 HOURS PRN
Qty: 30 TABLET | Refills: 0 | Status: SHIPPED | OUTPATIENT
Start: 2024-06-14

## 2024-06-14 RX ORDER — GABAPENTIN 600 MG/1
600 TABLET ORAL 2 TIMES DAILY
Qty: 180 TABLET | Refills: 1 | Status: SHIPPED | OUTPATIENT
Start: 2024-06-14

## 2024-06-14 ASSESSMENT — ENCOUNTER SYMPTOMS
DIARRHEA: 0
POLYPHAGIA: 0
HEADACHES: 0
DECREASED CONCENTRATION: 0
NECK STIFFNESS: 0
SLEEP DISTURBANCE: 0
TROUBLE SWALLOWING: 0
DYSURIA: 0
ACTIVITY CHANGE: 0
COUGH: 0
RECTAL PAIN: 0
FATIGUE: 0
CONSTITUTIONAL NEGATIVE: 1
DIZZINESS: 0
CHEST TIGHTNESS: 0
SORE THROAT: 0
DYSPHORIC MOOD: 0
PHOTOPHOBIA: 0
POLYDIPSIA: 0
BLOOD IN STOOL: 0
MYALGIAS: 0
ADENOPATHY: 0
SINUS PAIN: 0
HEMATURIA: 0
ABDOMINAL PAIN: 0
EYE PAIN: 0
FEVER: 0
SINUS PRESSURE: 0
ARTHRALGIAS: 1
NERVOUS/ANXIOUS: 0
OCCASIONAL FEELINGS OF UNSTEADINESS: 0
CONFUSION: 0
LOSS OF SENSATION IN FEET: 0
FLANK PAIN: 0
SHORTNESS OF BREATH: 0
CONSTIPATION: 0
STRIDOR: 0
PALPITATIONS: 0
DEPRESSION: 0
ABDOMINAL DISTENTION: 0
APPETITE CHANGE: 0
COLOR CHANGE: 0
SEIZURES: 0
SPEECH DIFFICULTY: 0
RHINORRHEA: 0
AGITATION: 0

## 2024-06-14 ASSESSMENT — ACTIVITIES OF DAILY LIVING (ADL)
BATHING: INDEPENDENT
TAKING_MEDICATION: INDEPENDENT
DRESSING: INDEPENDENT
GROCERY_SHOPPING: INDEPENDENT
MANAGING_FINANCES: INDEPENDENT
DOING_HOUSEWORK: INDEPENDENT

## 2024-06-14 ASSESSMENT — PATIENT HEALTH QUESTIONNAIRE - PHQ9
2. FEELING DOWN, DEPRESSED OR HOPELESS: NOT AT ALL
SUM OF ALL RESPONSES TO PHQ9 QUESTIONS 1 AND 2: 0
1. LITTLE INTEREST OR PLEASURE IN DOING THINGS: NOT AT ALL

## 2024-06-14 NOTE — PATIENT INSTRUCTIONS
Follow up in 4 months    Continue current medications and therapy for chronic medical conditions.    Patient was advised importance of proper diet/nutrition in addition adequate hydration. Patient was encouraged moderate exercise program to include 30 minutes daily for 5 days of the week or 150 minutes weekly. Patient will follow-up with us as scheduled.    I personally reviewed the OARRS report for this patient. I have considered the risks of abuse, dependence, addiction, and diversion.    UDS: 02/12/2024    Review lab results from May 2024    In office Accu-Chek and hemoglobin A1c    Obtain fasting lipid/NMR and CMP    Complete Medicare annual wellness physical/exam     Counseled on advanced directives/16 minutes    Obtain x-rays of right hip and LS-spine    Home physical therapy for further lumbar/hip

## 2024-06-14 NOTE — PROGRESS NOTES
Subjective   Reason for Visit: Alejandra Chowdhury is an 69 y.o. female here for a Medicare Wellness visit.     Past Medical, Surgical, and Family History reviewed and updated in chart.    Reviewed all medications by prescribing practitioner or clinical pharmacist (such as prescriptions, OTCs, herbal therapies and supplements) and documented in the medical record.    Patient states having some right hip pain. Patient states having the pain for about 6 months. Patient states is a sharp pain. Patient states is difficult sleep because she need keep just one position. Patient states it hurt when she stepping up. Patient states having the pain all the time.    Patient denies any other symptoms or concerns today.        Patient Care Team:  Cm Hanley DO as PCP - General (Family Medicine)  [facility]     Review of Systems   Constitutional: Negative.  Negative for activity change, appetite change, fatigue and fever.   HENT:  Negative for congestion, dental problem, ear discharge, ear pain, mouth sores, rhinorrhea, sinus pressure, sinus pain, sore throat, tinnitus and trouble swallowing.    Eyes:  Negative for photophobia, pain and visual disturbance.   Respiratory:  Negative for cough, chest tightness, shortness of breath and stridor.    Cardiovascular:  Negative for chest pain and palpitations.   Gastrointestinal:  Negative for abdominal distention, abdominal pain, blood in stool, constipation, diarrhea and rectal pain.   Endocrine: Negative for cold intolerance, heat intolerance, polydipsia, polyphagia and polyuria.   Genitourinary:  Negative for dysuria, flank pain, hematuria and urgency.   Musculoskeletal:  Positive for arthralgias (right hip pain). Negative for gait problem, myalgias and neck stiffness.        Left lower amputation/leg prosthesis   Skin:  Negative for color change and rash.   Allergic/Immunologic: Negative for environmental allergies and food  "allergies.   Neurological:  Negative for dizziness, seizures, syncope, speech difficulty and headaches.   Hematological:  Negative for adenopathy.   Psychiatric/Behavioral:  Negative for agitation, confusion, decreased concentration, dysphoric mood and sleep disturbance. The patient is not nervous/anxious.        Objective   Vitals:  /50 (BP Location: Left arm, Patient Position: Sitting, BP Cuff Size: Adult)   Pulse 71   Temp 36.2 °C (97.2 °F)   Resp 16   Ht 1.575 m (5' 2\")   Wt 67.6 kg (149 lb)   LMP  (LMP Unknown)   SpO2 96%   BMI 27.25 kg/m²       Physical Exam  Vitals reviewed.   Constitutional:       General: She is not in acute distress.     Appearance: Normal appearance. She is normal weight. She is not ill-appearing or diaphoretic.   HENT:      Head: Normocephalic.      Right Ear: Tympanic membrane and external ear normal.      Left Ear: Tympanic membrane and external ear normal.      Nose: Nose normal. No congestion.      Mouth/Throat:      Pharynx: No posterior oropharyngeal erythema.   Eyes:      General:         Right eye: No discharge.         Left eye: No discharge.      Extraocular Movements: Extraocular movements intact.      Conjunctiva/sclera: Conjunctivae normal.      Pupils: Pupils are equal, round, and reactive to light.   Cardiovascular:      Rate and Rhythm: Normal rate and regular rhythm.      Pulses: Normal pulses.      Heart sounds: Normal heart sounds. No murmur heard.  Pulmonary:      Effort: Pulmonary effort is normal. No respiratory distress.      Breath sounds: Normal breath sounds. No wheezing or rales.   Chest:      Chest wall: No tenderness.   Abdominal:      General: Abdomen is flat. Bowel sounds are normal. There is no distension.      Palpations: There is no mass.      Tenderness: There is no abdominal tenderness. There is no guarding.   Musculoskeletal:         General: No tenderness. Normal range of motion.      Cervical back: Normal range of motion and neck " supple. No tenderness.      Right lower leg: No edema.      Left lower leg: No edema.      Comments: Left BKA   Skin:     General: Skin is dry.      Coloration: Skin is not jaundiced.      Findings: No bruising, erythema or rash.   Neurological:      General: No focal deficit present.      Mental Status: She is alert and oriented to person, place, and time. Mental status is at baseline.      Cranial Nerves: No cranial nerve deficit.      Sensory: No sensory deficit.      Coordination: Coordination normal.      Gait: Gait abnormal.   Psychiatric:         Mood and Affect: Mood normal.         Thought Content: Thought content normal.         Judgment: Judgment normal.         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)    Relevant Orders    POCT glycosylated hemoglobin (Hb A1C) manually resulted (Completed)    POCT Fingerstick Glucose manually resulted (Completed)    Comprehensive Metabolic Panel    Lipid Panel    Pain in left lumbar region of back    Relevant Orders    XR lumbar spine 2-3 views    Congenital anomaly of spinal cord (Multi)    Relevant Medications    gabapentin (Neurontin) 600 mg tablet    Generalized osteoarthrosis    Relevant Medications    traMADol (Ultram) 50 mg tablet     Other Visit Diagnoses       Medicare annual wellness visit, subsequent    -  Primary    Routine general medical examination at health care facility        Pain of right hip        Relevant Orders    XR hip right with pelvis when performed 2 or 3 views          Provider Attestation - Scribe documentation    All medical record entries made by the Scribe were at my direction and personally dictated by me. I have reviewed the chart and agree that the record accurately reflects my personal performance of the history, physical exam, discussion and plan.

## 2024-06-19 NOTE — PROGRESS NOTES
Bed: 14  Expected date: 6/19/24  Expected time: 6:29 AM  Means of arrival:   Comments:  Antioc    Subjective   Patient ID: Benoit Chowdhury is a 68 y.o. female who presents for Rash.    Rash  Pertinent negatives include no congestion, cough, diarrhea, eye pain, fatigue, fever, rhinorrhea, shortness of breath or sore throat.    patient is having rash for 2 months on the left side of arm and left flank. She is having itching and pain with the rash.  She is having dizziness for 1 month.   She has stopped the breast cancer medication Exemestae 25 mg. She is having urine burning but did see Dr. Moeller and noted no infection.     Refills needed are the ambien, phenazopyrindene 100mg, gabapentin    Review of Systems   Constitutional: Negative.  Negative for activity change, appetite change, fatigue and fever.   HENT:  Negative for congestion, dental problem, ear discharge, ear pain, mouth sores, rhinorrhea, sinus pressure, sinus pain, sore throat, tinnitus and trouble swallowing.    Eyes:  Negative for photophobia, pain and visual disturbance.   Respiratory:  Negative for cough, chest tightness, shortness of breath and stridor.    Cardiovascular:  Negative for chest pain and palpitations.   Gastrointestinal:  Negative for abdominal distention, abdominal pain, blood in stool, constipation, diarrhea and rectal pain.   Endocrine: Negative for cold intolerance, heat intolerance, polydipsia, polyphagia and polyuria.   Genitourinary:  Negative for dysuria, flank pain, hematuria and urgency.   Musculoskeletal:  Negative for arthralgias, gait problem, myalgias and neck stiffness.   Skin:  Positive for rash. Negative for color change.   Allergic/Immunologic: Negative for environmental allergies and food allergies.   Neurological:  Negative for dizziness, seizures, syncope, speech difficulty and headaches.   Hematological:  Negative for adenopathy.   Psychiatric/Behavioral:  Negative for agitation, confusion, decreased concentration, dysphoric mood and sleep disturbance. The patient is not nervous/anxious.        Objective   /68  "(BP Location: Left arm, Patient Position: Sitting, BP Cuff Size: Adult)   Pulse 66   Temp 36.6 °C (97.9 °F)   Resp 16   Ht 1.575 m (5' 2\")   Wt 69.4 kg (153 lb)   LMP  (LMP Unknown)   SpO2 99%   BMI 27.98 kg/m²     Physical Exam  Vitals reviewed.   Constitutional:       General: She is not in acute distress.     Appearance: Normal appearance. She is normal weight. She is not ill-appearing or diaphoretic.   HENT:      Head: Normocephalic.      Right Ear: Tympanic membrane and external ear normal.      Left Ear: Tympanic membrane and external ear normal.      Nose: Nose normal. No congestion.      Mouth/Throat:      Pharynx: No posterior oropharyngeal erythema.   Eyes:      General:         Right eye: No discharge.         Left eye: No discharge.      Extraocular Movements: Extraocular movements intact.      Conjunctiva/sclera: Conjunctivae normal.      Pupils: Pupils are equal, round, and reactive to light.   Cardiovascular:      Rate and Rhythm: Normal rate and regular rhythm.      Pulses: Normal pulses.      Heart sounds: Normal heart sounds. No murmur heard.  Pulmonary:      Effort: Pulmonary effort is normal. No respiratory distress.      Breath sounds: Normal breath sounds. No wheezing or rales.   Chest:      Chest wall: No tenderness.   Abdominal:      General: Abdomen is flat. Bowel sounds are normal. There is no distension.      Palpations: There is no mass.      Tenderness: There is no abdominal tenderness. There is no guarding.   Musculoskeletal:         General: No tenderness. Normal range of motion.      Cervical back: Normal range of motion and neck supple. No tenderness.      Right lower leg: No edema.      Left lower leg: No edema.   Skin:     General: Skin is dry.      Coloration: Skin is not jaundiced.      Findings: No bruising, erythema or rash.   Neurological:      General: No focal deficit present.      Mental Status: She is alert and oriented to person, place, and time. Mental status is " at baseline.      Cranial Nerves: No cranial nerve deficit.      Sensory: No sensory deficit.      Coordination: Coordination normal.      Gait: Gait normal.   Psychiatric:         Mood and Affect: Mood normal.         Thought Content: Thought content normal.         Judgment: Judgment normal.         Assessment/Plan   Problem List Items Addressed This Visit             ICD-10-CM    Benign essential hypertension I10    Dizziness R42    Relevant Orders    CT head wo IV contrast    DM type 2 with diabetic dyslipidemia (CMS/[facility]) - Primary E11.69, E78.5    Hyperlipidemia E78.5    Hypothyroidism E03.9    Insomnia G47.00    Relevant Medications    zolpidem (Ambien) 5 mg tablet    Stage 3a chronic kidney disease (CMS/[facility]) N18.31    Invasive lobular carcinoma of breast in female (CMS/[facility]) C50.919    Congenital anomaly of spinal cord (CMS/[facility]) Q06.9    Relevant Medications    gabapentin (Neurontin) 600 mg tablet     Other Visit Diagnoses         Codes    Dysuria     R30.0    Relevant Orders    POCT UA (nonautomated) manually resulted (Completed)    Dermatitis     L30.9    Relevant Medications    triamcinolone (Kenalog) 0.1 % cream    predniSONE (Deltasone) 10 mg tablet    Medication management     Z79.899    Relevant Orders    OOB Internal Tracking    Burning sensation     R20.8    Relevant Orders    Urine Culture    Abnormal urinalysis     R82.90    Relevant Orders    Urine Culture              Scribe Attestation  By signing my name below, I, JUANY Gordillo , Noemyibe   attest that this documentation has been prepared under the direction and in the presence of Cm Hanley DO.   Provider Attestation - Scribe documentation    All medical record entries made by the Scribe were at my direction and personally dictated by me. I have reviewed the chart and agree that the record accurately reflects my personal performance of the history, physical exam, discussion and plan.

## 2024-07-04 DIAGNOSIS — E78.5 DM TYPE 2 WITH DIABETIC DYSLIPIDEMIA (MULTI): ICD-10-CM

## 2024-07-04 DIAGNOSIS — E11.69 DM TYPE 2 WITH DIABETIC DYSLIPIDEMIA (MULTI): ICD-10-CM

## 2024-07-05 DIAGNOSIS — M25.551 CHRONIC RIGHT HIP PAIN: ICD-10-CM

## 2024-07-05 DIAGNOSIS — G89.29 CHRONIC RIGHT HIP PAIN: ICD-10-CM

## 2024-07-05 RX ORDER — TIRZEPATIDE 2.5 MG/.5ML
INJECTION, SOLUTION SUBCUTANEOUS
Qty: 2 ML | Refills: 0 | Status: SHIPPED | OUTPATIENT
Start: 2024-07-05

## 2024-07-08 DIAGNOSIS — M15.9 GENERALIZED OSTEOARTHROSIS: ICD-10-CM

## 2024-07-08 RX ORDER — TRAMADOL HYDROCHLORIDE 50 MG/1
50 TABLET ORAL EVERY 6 HOURS PRN
Qty: 30 TABLET | Refills: 0 | Status: SHIPPED | OUTPATIENT
Start: 2024-07-08

## 2024-07-12 ENCOUNTER — HOSPITAL ENCOUNTER (OUTPATIENT)
Dept: RADIOLOGY | Facility: HOSPITAL | Age: 69
Discharge: HOME | End: 2024-07-12
Payer: MEDICARE

## 2024-07-12 ENCOUNTER — APPOINTMENT (OUTPATIENT)
Dept: RADIOLOGY | Facility: HOSPITAL | Age: 69
End: 2024-07-12
Payer: MEDICARE

## 2024-07-12 DIAGNOSIS — M25.551 CHRONIC RIGHT HIP PAIN: ICD-10-CM

## 2024-07-12 DIAGNOSIS — G89.29 CHRONIC RIGHT HIP PAIN: ICD-10-CM

## 2024-07-12 PROCEDURE — 2500000005 HC RX 250 GENERAL PHARMACY W/O HCPCS: Performed by: STUDENT IN AN ORGANIZED HEALTH CARE EDUCATION/TRAINING PROGRAM

## 2024-07-12 PROCEDURE — 2500000004 HC RX 250 GENERAL PHARMACY W/ HCPCS (ALT 636 FOR OP/ED): Performed by: FAMILY MEDICINE

## 2024-07-12 PROCEDURE — 77002 NEEDLE LOCALIZATION BY XRAY: CPT | Mod: RT

## 2024-07-12 PROCEDURE — 2500000004 HC RX 250 GENERAL PHARMACY W/ HCPCS (ALT 636 FOR OP/ED): Performed by: STUDENT IN AN ORGANIZED HEALTH CARE EDUCATION/TRAINING PROGRAM

## 2024-07-12 RX ORDER — BUPIVACAINE HYDROCHLORIDE 5 MG/ML
3 INJECTION, SOLUTION EPIDURAL; INTRACAUDAL
Status: COMPLETED | OUTPATIENT
Start: 2024-07-12 | End: 2024-07-12

## 2024-07-12 RX ORDER — TRIAMCINOLONE ACETONIDE 40 MG/ML
40 INJECTION, SUSPENSION INTRA-ARTICULAR; INTRAMUSCULAR
Status: COMPLETED | OUTPATIENT
Start: 2024-07-12 | End: 2024-07-12

## 2024-07-12 RX ORDER — LIDOCAINE HYDROCHLORIDE 10 MG/ML
8 INJECTION, SOLUTION EPIDURAL; INFILTRATION; INTRACAUDAL; PERINEURAL
Status: COMPLETED | OUTPATIENT
Start: 2024-07-12 | End: 2024-07-12

## 2024-08-02 ENCOUNTER — PATIENT MESSAGE (OUTPATIENT)
Dept: PRIMARY CARE | Facility: CLINIC | Age: 69
End: 2024-08-02
Payer: MEDICARE

## 2024-08-02 DIAGNOSIS — Q06.9 CONGENITAL ANOMALY OF SPINAL CORD (MULTI): ICD-10-CM

## 2024-08-02 DIAGNOSIS — F32.9 MAJOR DEPRESSIVE DISORDER, REMISSION STATUS UNSPECIFIED, UNSPECIFIED WHETHER RECURRENT: ICD-10-CM

## 2024-08-02 DIAGNOSIS — E78.2 MIXED HYPERLIPIDEMIA: ICD-10-CM

## 2024-08-02 DIAGNOSIS — E03.9 ACQUIRED HYPOTHYROIDISM: ICD-10-CM

## 2024-08-02 DIAGNOSIS — E11.69 DM TYPE 2 WITH DIABETIC DYSLIPIDEMIA (MULTI): ICD-10-CM

## 2024-08-02 DIAGNOSIS — E78.5 DM TYPE 2 WITH DIABETIC DYSLIPIDEMIA (MULTI): ICD-10-CM

## 2024-08-02 DIAGNOSIS — I10 BENIGN ESSENTIAL HYPERTENSION: ICD-10-CM

## 2024-08-02 RX ORDER — TIRZEPATIDE 2.5 MG/.5ML
2.5 INJECTION, SOLUTION SUBCUTANEOUS WEEKLY
Qty: 2 ML | Refills: 0 | Status: SHIPPED | OUTPATIENT
Start: 2024-08-02

## 2024-08-02 RX ORDER — SERTRALINE HYDROCHLORIDE 50 MG/1
50 TABLET, FILM COATED ORAL DAILY
Qty: 90 TABLET | Refills: 1 | Status: SHIPPED | OUTPATIENT
Start: 2024-08-02

## 2024-08-02 RX ORDER — THYROID 30 MG/1
30 TABLET ORAL DAILY
Qty: 90 TABLET | Refills: 1 | Status: SHIPPED | OUTPATIENT
Start: 2024-08-02

## 2024-08-02 RX ORDER — METOPROLOL SUCCINATE 25 MG/1
25 TABLET, EXTENDED RELEASE ORAL DAILY
Qty: 90 TABLET | Refills: 1 | Status: SHIPPED | OUTPATIENT
Start: 2024-08-02

## 2024-08-02 RX ORDER — SIMVASTATIN 10 MG/1
10 TABLET, FILM COATED ORAL NIGHTLY
Qty: 90 TABLET | Refills: 1 | Status: SHIPPED | OUTPATIENT
Start: 2024-08-02

## 2024-08-02 RX ORDER — GABAPENTIN 600 MG/1
600 TABLET ORAL 2 TIMES DAILY
Qty: 180 TABLET | Refills: 1 | Status: SHIPPED | OUTPATIENT
Start: 2024-08-02

## 2024-08-02 RX ORDER — LOSARTAN POTASSIUM 25 MG/1
25 TABLET ORAL DAILY
Qty: 90 TABLET | Refills: 1 | Status: SHIPPED | OUTPATIENT
Start: 2024-08-02

## 2024-08-14 ENCOUNTER — HOSPITAL ENCOUNTER (OUTPATIENT)
Dept: LAB | Age: 69
Discharge: HOME | End: 2024-08-14
Payer: MEDICARE

## 2024-08-14 LAB
BACTERIA URNS QL MICRO: NEGATIVE /HPF
BILIRUB UR QL STRIP: NEGATIVE
CLARITY UR: CLEAR
COLOR UR: YELLOW
EPI CELLS #/AREA URNS AUTO: ABNORMAL /HPF (ref 0–5)
GLUCOSE UR STRIP-MCNC: >=1000 MG/DL
HGB UR QL STRIP: NEGATIVE
HYALINE CASTS #/AREA URNS AUTO: ABNORMAL /HPF (ref 0–5)
KETONES UR STRIP-MCNC: NEGATIVE MG/DL
LEUKOCYTE ESTERASE UR QL STRIP: ABNORMAL
NITRITE UR QL STRIP: NEGATIVE
PH UR STRIP: 6 [PH] (ref 5–9)
PROT UR STRIP-MCNC: NEGATIVE MG/DL
RBC #/AREA URNS AUTO: ABNORMAL /HPF (ref 0–5)
SP GR UR STRIP: 1.02 (ref 1–1.03)
UROBILINOGEN UR STRIP-ACNC: 0.2 E.U./DL
WBC #/AREA URNS AUTO: >100 /HPF (ref 0–5)

## 2024-08-14 PROCEDURE — 81001 URINALYSIS AUTO W/SCOPE: CPT

## 2024-08-14 PROCEDURE — 87086 URINE CULTURE/COLONY COUNT: CPT

## 2024-08-16 LAB — BACTERIA UR CULT: NORMAL

## 2024-09-11 ENCOUNTER — APPOINTMENT (OUTPATIENT)
Dept: PRIMARY CARE | Facility: CLINIC | Age: 69
End: 2024-09-11
Payer: MEDICARE

## 2024-09-11 VITALS
HEART RATE: 78 BPM | HEIGHT: 62 IN | OXYGEN SATURATION: 93 % | BODY MASS INDEX: 27.05 KG/M2 | TEMPERATURE: 98 F | WEIGHT: 147 LBS | SYSTOLIC BLOOD PRESSURE: 98 MMHG | DIASTOLIC BLOOD PRESSURE: 44 MMHG

## 2024-09-11 DIAGNOSIS — E11.69 DM TYPE 2 WITH DIABETIC DYSLIPIDEMIA (MULTI): Primary | ICD-10-CM

## 2024-09-11 DIAGNOSIS — Z12.11 ENCOUNTER FOR COLORECTAL CANCER SCREENING: ICD-10-CM

## 2024-09-11 DIAGNOSIS — M15.9 GENERALIZED OSTEOARTHROSIS: ICD-10-CM

## 2024-09-11 DIAGNOSIS — Z12.12 ENCOUNTER FOR COLORECTAL CANCER SCREENING: ICD-10-CM

## 2024-09-11 DIAGNOSIS — E78.5 DM TYPE 2 WITH DIABETIC DYSLIPIDEMIA (MULTI): Primary | ICD-10-CM

## 2024-09-11 DIAGNOSIS — L30.9 DERMATITIS: ICD-10-CM

## 2024-09-11 LAB
POC FINGERSTICK BLOOD GLUCOSE: 90 MG/DL (ref 70–100)
POC HEMOGLOBIN A1C: 5.3 % (ref 4.2–6.5)

## 2024-09-11 PROCEDURE — 3008F BODY MASS INDEX DOCD: CPT | Performed by: FAMILY MEDICINE

## 2024-09-11 PROCEDURE — 3060F POS MICROALBUMINURIA REV: CPT | Performed by: FAMILY MEDICINE

## 2024-09-11 PROCEDURE — G2211 COMPLEX E/M VISIT ADD ON: HCPCS | Performed by: FAMILY MEDICINE

## 2024-09-11 PROCEDURE — 99214 OFFICE O/P EST MOD 30 MIN: CPT | Performed by: FAMILY MEDICINE

## 2024-09-11 PROCEDURE — 1123F ACP DISCUSS/DSCN MKR DOCD: CPT | Performed by: FAMILY MEDICINE

## 2024-09-11 PROCEDURE — 1160F RVW MEDS BY RX/DR IN RCRD: CPT | Performed by: FAMILY MEDICINE

## 2024-09-11 PROCEDURE — 1157F ADVNC CARE PLAN IN RCRD: CPT | Performed by: FAMILY MEDICINE

## 2024-09-11 PROCEDURE — 1159F MED LIST DOCD IN RCRD: CPT | Performed by: FAMILY MEDICINE

## 2024-09-11 PROCEDURE — 82962 GLUCOSE BLOOD TEST: CPT | Performed by: FAMILY MEDICINE

## 2024-09-11 PROCEDURE — 3078F DIAST BP <80 MM HG: CPT | Performed by: FAMILY MEDICINE

## 2024-09-11 PROCEDURE — 3074F SYST BP LT 130 MM HG: CPT | Performed by: FAMILY MEDICINE

## 2024-09-11 PROCEDURE — 1158F ADVNC CARE PLAN TLK DOCD: CPT | Performed by: FAMILY MEDICINE

## 2024-09-11 PROCEDURE — 83036 HEMOGLOBIN GLYCOSYLATED A1C: CPT | Performed by: FAMILY MEDICINE

## 2024-09-11 RX ORDER — TRAMADOL HYDROCHLORIDE 50 MG/1
50 TABLET ORAL EVERY 6 HOURS PRN
Qty: 30 TABLET | Refills: 0 | Status: SHIPPED | OUTPATIENT
Start: 2024-09-11

## 2024-09-11 RX ORDER — IBUPROFEN 200 MG
CAPSULE ORAL
Qty: 100 STRIP | Refills: 1 | Status: SHIPPED | OUTPATIENT
Start: 2024-09-11

## 2024-09-11 RX ORDER — TIRZEPATIDE 5 MG/.5ML
5 INJECTION, SOLUTION SUBCUTANEOUS WEEKLY
Qty: 2 ML | Refills: 0 | Status: SHIPPED | OUTPATIENT
Start: 2024-09-11

## 2024-09-11 RX ORDER — TRIAMCINOLONE ACETONIDE 1 MG/G
CREAM TOPICAL 2 TIMES DAILY
Qty: 453.6 G | Refills: 0 | Status: SHIPPED | OUTPATIENT
Start: 2024-09-11

## 2024-09-11 RX ORDER — TIRZEPATIDE 2.5 MG/.5ML
2.5 INJECTION, SOLUTION SUBCUTANEOUS
Qty: 2 ML | Refills: 2 | Status: CANCELLED | OUTPATIENT
Start: 2024-09-11

## 2024-09-11 ASSESSMENT — ENCOUNTER SYMPTOMS
ABDOMINAL DISTENTION: 0
CHEST TIGHTNESS: 0
PHOTOPHOBIA: 0
SPEECH DIFFICULTY: 0
FATIGUE: 1
AGITATION: 0
POLYDIPSIA: 0
ARTHRALGIAS: 0
NERVOUS/ANXIOUS: 0
APPETITE CHANGE: 0
COLOR CHANGE: 0
SINUS PRESSURE: 0
HEADACHES: 0
CONFUSION: 0
DIZZINESS: 0
RECTAL PAIN: 0
ADENOPATHY: 0
SHORTNESS OF BREATH: 0
ACTIVITY CHANGE: 0
FLANK PAIN: 0
MYALGIAS: 0
SORE THROAT: 0
STRIDOR: 0
DYSURIA: 0
SEIZURES: 0
SINUS PAIN: 0
CONSTIPATION: 0
PALPITATIONS: 0
RHINORRHEA: 0
DECREASED CONCENTRATION: 0
HEMATURIA: 0
DIARRHEA: 0
BLOOD IN STOOL: 0
ABDOMINAL PAIN: 0
SLEEP DISTURBANCE: 0
DYSPHORIC MOOD: 0
NECK STIFFNESS: 0
FEVER: 0
TROUBLE SWALLOWING: 0
COUGH: 0
POLYPHAGIA: 0
EYE PAIN: 0

## 2024-09-11 NOTE — PATIENT INSTRUCTIONS
Follow up in 3 months     Continue current medications and therapy for chronic medical conditions.    Patient was advised importance of proper diet/nutrition in addition adequate hydration. Patient was encouraged moderate exercise program to include 30 minutes daily for 5 days of the week or 150 minutes weekly. Patient will follow-up with us as scheduled.    I personally reviewed the OARRS report for this patient. I have considered the risks of abuse, dependence, addiction, and diversion.    UDS: 02/13/2024    In office Accu-Chek and hemoglobin A1c    Stop Losartan     Increase Mounjaro to 5mg once weekly     Obtain Fasting Lipid, CMP and A1C in 3 months     Digital Cologuard

## 2024-09-11 NOTE — PROGRESS NOTES
Subjective   Patient ID: Benoit Chowdhury is a 69 y.o. female who presents for Diabetes.    HPI   Follow up diabetes  Has not been monitoring blood sugar recently because she has been without test strips  Experiences occasional hypoglycemic episodes but carries candy to resolve it  Patient states she discontinued the Farxiga because she was experiencing pain during urination and urgency. Had UA done with no suggestions of UTI   Currently taking mounjaro 2.5 mg weekly  On statin therapy, taking simvastatin 10 mg nightly     Follow up hypertension  Currently taking metoprolol 25 mg and losartan 25 mg nightly  States she takes the medication at night because they make her feel tired  Does not monitor BP regularly  Denies headaches, SOB, edema, dizziness, and palpitations    Review of Systems   Constitutional:  Positive for fatigue. Negative for activity change, appetite change and fever.   HENT:  Negative for congestion, dental problem, ear discharge, ear pain, mouth sores, rhinorrhea, sinus pressure, sinus pain, sore throat, tinnitus and trouble swallowing.    Eyes:  Negative for photophobia, pain and visual disturbance.   Respiratory:  Negative for cough, chest tightness, shortness of breath and stridor.    Cardiovascular:  Negative for chest pain and palpitations.   Gastrointestinal:  Negative for abdominal distention, abdominal pain, blood in stool, constipation, diarrhea and rectal pain.   Endocrine: Negative for cold intolerance, heat intolerance, polydipsia, polyphagia and polyuria.   Genitourinary:  Negative for dysuria, flank pain, hematuria and urgency.   Musculoskeletal:  Negative for arthralgias, gait problem, myalgias and neck stiffness.   Skin:  Negative for color change and rash.   Allergic/Immunologic: Negative for environmental allergies and food allergies.   Neurological:  Negative for dizziness, seizures, syncope, speech difficulty and headaches.   Hematological:  Negative for adenopathy.  "  Psychiatric/Behavioral:  Negative for agitation, confusion, decreased concentration, dysphoric mood and sleep disturbance. The patient is not nervous/anxious.        Objective   BP (!) 98/44 (BP Location: Left arm, Patient Position: Sitting, BP Cuff Size: Adult)   Pulse 78   Temp 36.7 °C (98 °F)   Ht 1.575 m (5' 2\")   Wt 66.7 kg (147 lb)   LMP  (LMP Unknown)   SpO2 93%   BMI 26.89 kg/m²     Physical Exam  Vitals reviewed.   Constitutional:       General: She is not in acute distress.     Appearance: Normal appearance. She is normal weight. She is not ill-appearing or diaphoretic.   HENT:      Head: Normocephalic.      Right Ear: Tympanic membrane and external ear normal.      Left Ear: Tympanic membrane and external ear normal.      Nose: Nose normal. No congestion.      Mouth/Throat:      Pharynx: No posterior oropharyngeal erythema.   Eyes:      General:         Right eye: No discharge.         Left eye: No discharge.      Extraocular Movements: Extraocular movements intact.      Conjunctiva/sclera: Conjunctivae normal.      Pupils: Pupils are equal, round, and reactive to light.   Cardiovascular:      Rate and Rhythm: Normal rate and regular rhythm.      Pulses: Normal pulses.      Heart sounds: Normal heart sounds. No murmur heard.  Pulmonary:      Effort: Pulmonary effort is normal. No respiratory distress.      Breath sounds: Normal breath sounds. No wheezing or rales.   Chest:      Chest wall: No tenderness.   Abdominal:      General: Abdomen is flat. Bowel sounds are normal. There is no distension.      Palpations: There is no mass.      Tenderness: There is no abdominal tenderness. There is no guarding.   Musculoskeletal:         General: No tenderness. Normal range of motion.      Cervical back: Normal range of motion and neck supple. No tenderness.      Right lower leg: No edema.      Left lower leg: No edema.   Skin:     General: Skin is dry.      Coloration: Skin is not jaundiced.      " Findings: No bruising, erythema or rash.   Neurological:      General: No focal deficit present.      Mental Status: She is alert and oriented to person, place, and time. Mental status is at baseline.      Cranial Nerves: No cranial nerve deficit.      Sensory: No sensory deficit.      Coordination: Coordination normal.      Gait: Gait normal.   Psychiatric:         Mood and Affect: Mood normal.         Thought Content: Thought content normal.         Judgment: Judgment normal.         Assessment/Plan   Problem List Items Addressed This Visit             ICD-10-CM    DM type 2 with diabetic dyslipidemia ([facility]) - Primary E11.69, E78.5    Relevant Medications    blood sugar diagnostic (Blood Glucose Test) strip    tirzepatide (Mounjaro) 5 mg/0.5 mL pen injector    Other Relevant Orders    POCT glycosylated hemoglobin (Hb A1C) manually resulted (Completed)    POCT fingerstick glucose manually resulted (Completed)    Comprehensive Metabolic Panel    Lipid Panel    Hemoglobin A1C    Generalized osteoarthrosis M15.9    Relevant Medications    traMADol (Ultram) 50 mg tablet     Other Visit Diagnoses         Codes    Dermatitis     L30.9    Relevant Medications    triamcinolone (Kenalog) 0.1 % cream    Encounter for colorectal cancer screening     Z12.11, Z12.12    Relevant Orders    Cologuard® colon cancer screening              Scribe Attestation  By signing my name below, IBarbra RMA , Michelle   attest that this documentation has been prepared under the direction and in the presence of Cm Hanley DO.   Provider Attestation - Scribe documentation    All medical record entries made by the Scribe were at my direction and personally dictated by me. I have reviewed the chart and agree that the record accurately reflects my personal performance of the history, physical exam, discussion and plan.

## 2024-09-11 NOTE — LETTER
October 9, 2024     Benoit Chowdhury  [address] [place] [address]      Dear Ms. Chowdhury:    Below are the results from your recent visit:    COLOGUARD IS NEGATIVE    Resulted Orders   POCT glycosylated hemoglobin (Hb A1C) manually resulted   Result Value Ref Range    POC HEMOGLOBIN A1c 5.3 4.2 - 6.5 %   POCT fingerstick glucose manually resulted   Result Value Ref Range    POC Fingerstick Blood Glucose 90 70 - 100 mg/dl   Cologuard® colon cancer screening   Result Value Ref Range    NONINV COLON CA DNA+OCC BLD SCRN STL QL Negative Negative      Comment:        NEGATIVE TEST RESULT. A negative Cologuard result indicates a low likelihood that a colorectal cancer (CRC) or advanced adenoma (adenomatous polyps with more advanced pre-malignant features)  is present. The chance that a person with a negative Cologuard test has a colorectal cancer is less than 1 in 1500 (negative predictive value >99.9%) or has an  advanced adenoma is less than  5.3% (negative predictive value 94.7%). These data are based on a prospective cross-sectional study of 10,000 individuals at average risk for colorectal cancer who were screened with both Cologuard and colonoscopy. (Prashanth Stein al, N Engl J Med 2014;370(14):0880-6623) The normal value (reference range) for this assay is negative.    COLOGUARD RE-SCREENING RECOMMENDATION: Periodic colorectal cancer screening is an important part of preventive healthcare for asymptomatic individuals at average risk for colorectal cancer.  Following a negative Cologuard result, the American Cancer Society and U.S.  Multi-Society Task Force screening guidelines recommend a Cologuard re-screening interval of 3 years.   References: American Cancer Society Guideline for Colorectal Cancer Screening: https://www.cancer.org/cancer/colon-rectal-cancer/vjzpaoune-wuyiyaiki-spvvdxb/acs-recommendations.html.; Jez WHITE, Lexus SARKAR, Bony WHITE, Colorectal Cancer Screening: Recommendations for Physicians and  Patients from the U.S. Multi-Society Task Force on Colorectal Cancer Screening , Am J Gastroenterology 2017; 112:7939-2026.    TEST DESCRIPTION: Composite algorithmic analysis of stool DNA-biomarkers with hemoglobin immunoassay.   Quantitative values of individual biomarkers are not reportable and are not associated with individual biomarker result reference ranges. Cologuard is intended for colorectal cancer screening of adults of either sex, 45 years or older, who are at average-risk for colorectal cancer (CRC). Cologuard has been approved for use by the U.S. FDA. The performance of Cologuard was  established in a cross sectional study of average-risk adults aged 50-84. Cologuard performance in patients ages 45 to 49 years was estimated by sub-group analysis of near-age groups. Colonoscopies performed for a positive result may find as the most clinically significant lesion: colorectal cancer [4.0%], advanced adenoma (including sessile serrated polyps greater than or equal to 1cm diameter) [20%] or non- advanced adenoma [31%]; or no colorectal neoplasia [45%]. These estimates are derived from a prospective cross-sectional screening study of 10,000 individuals at average risk for colorectal cancer who were screened with both Cologuard and colonoscopy. (Prashanth FARAH. et al, N Engl J Med 2014;370(14):4113-6348.) Cologuard may produce a false negative or false positive result (no colorectal cancer or precancerous polyp present at colonoscopy follow up). A negative Cologuard test result does not guarantee the absence of CRC or advanced adenoma (pre-cancer). The current Cologuard  screening interval is every 3 years. (American Cancer Society and U.S. Multi-Society Task Force). Cologuard performance data in a 10,000 patient pivotal study using colonoscopy as the reference method can be accessed at the following location: www.[employer]ortera/results. Additional description of the Cologuard test process, warnings and  precautions can be found at www.cologuard.com.       The test results show that your current treatment is working. Please continue your current medication and plan. We recommend that you repeat the above test(s) in 3 years.    If you have any questions or concerns, please don't hesitate to call.         Sincerely,        Cm Hanley, DO

## 2024-10-03 DIAGNOSIS — E78.5 DM TYPE 2 WITH DIABETIC DYSLIPIDEMIA (MULTI): ICD-10-CM

## 2024-10-03 DIAGNOSIS — E11.69 DM TYPE 2 WITH DIABETIC DYSLIPIDEMIA (MULTI): ICD-10-CM

## 2024-10-04 RX ORDER — TIRZEPATIDE 5 MG/.5ML
5 INJECTION, SOLUTION SUBCUTANEOUS WEEKLY
Qty: 2 ML | Refills: 0 | Status: SHIPPED | OUTPATIENT
Start: 2024-10-04

## 2024-10-08 LAB — NONINV COLON CA DNA+OCC BLD SCRN STL QL: NEGATIVE

## 2024-10-29 NOTE — PROGRESS NOTES
Patient ID: Benoit Chowdhury is a 69 y.o. female.    The patient presents to clinic today for her history of breast cancer.     Cancer Staging   Invasive lobular carcinoma of breast in female  Staging form: Breast, AJCC 8th Edition  - Clinical stage from 3/22/2023: Stage IA (cT1c, cN0, cM0, G2, ER+, MS+, HER2-) - Unsigned        Diagnostic/Therapeutic History:  - On 1/18/2023 she had screening mammogram that showed a spiculated mass in the upper outer right breast, bilateral benign similar-appearing circumscribed and obscured  masses are noted.  No suspicious masses or calcification of the left breast.  BI-RADS Category 0     - right breast rash in January 2023     -On 2/2/2023 she had targeted ultrasound of the right breast that showedat 10:00, 9 cm from the nipple a 0.7 x 0.5 x 0.8 cm irregular hypoechoic mass which corresponds to the mass seen on mammogram, 7 morphologically normal lymph nodes identified the  right axilla      -On 2/8/2023 she had right breast ultrasound-guided core biopsy that showed invasive lobular carcinoma, grade 2 ER 95%, MS 95%, HER2 negative 1+.  Patient also had lobular carcinoma in situ, MammaPrint index +0.624, low risk luminal type a     -On 3/22/2023, she underwent right partial mastectomy with sentinel lymph node biopsy (0/1) that showed invasive lobular carcinoma 12 mm in greatest dimension, grade 2 with negative margins staged as a PT1CN0 ER 95%, MS 95%, HER2 negative 1+, did have  LCIS      - 5/1/23- 5/26/23: completed radiation     History of Present Illness (HPI)/Interval History:  Ms. Chowdhury presents for presents today for follow-up and surveillance.     She denies any new breast cancer concerns. She did have a bx in Jan 2024 due to new post-op changes on mammogram and US, found to be fat necrosis and hemosiderin laden macrophages.     She denies any chest pain or breathing issues.     She denies any vision changes, dizziness, loss of balance.  Hx of migraines, manageable headaches.  No recent falls.     She denies any new or unexplained bone aches or pains. Baseline arthritic pain.     She denies any skin lesions or masses.    She reports a normal appetite and normal bowel movements.    She completed high dose vit d - not on any vit d at this time.     Review of Systems:  14-point ROS otherwise negative, as per HPI.    Past Medical History:   Diagnosis Date    Acute gastritis with bleeding 04/10/2021    Acute superficial gastritis with hemorrhage    Allergic contact dermatitis, unspecified cause 05/22/2022    Allergic dermatitis    Cutaneous abscess of groin 10/04/2021    Abscess of groin    Cutaneous abscess of left axilla 10/20/2021    Abscess of axilla, left    Disorder of kidney and ureter, unspecified 10/25/2021    Low kidney function    Effusion, right foot 03/18/2021    Swelling of first metatarsophalangeal (MTP) joint of right foot    Encounter for general adult medical examination without abnormal findings 02/08/2021    Encounter for Medicare annual wellness exam    Encounter for general adult medical examination without abnormal findings 05/18/2022    Encounter for Medicare annual wellness exam    Encounter for other screening for malignant neoplasm of breast     Breast cancer screening    Encounter for screening for malignant neoplasm of colon 09/23/2021    Encounter for colorectal cancer screening    Nonspecific lymphadenitis, unspecified 10/20/2021    Axillary adenitis    Otitis media, unspecified, unspecified ear 01/09/2020    Ear infection    Pain in right toe(s) 03/18/2021    Pain of right great toe    Pain in unspecified joint 05/08/2020    Multiple joint pain    Personal history of diseases of the skin and subcutaneous tissue 10/20/2021    History of folliculitis    Personal history of diseases of the skin and subcutaneous tissue 07/22/2020    History of acute dermatitis    Personal history of diseases of the skin and subcutaneous tissue 07/22/2020    History of skin pruritus     Personal history of other drug therapy 2021    History of influenza vaccination    Personal history of other drug therapy 2020    History of influenza vaccination    Personal history of other endocrine, nutritional and metabolic disease 2021    History of hyperglycemia    Personal history of other specified conditions 2022    History of diarrhea    Personal history of other specified conditions 2022    History of vomiting    Rash and other nonspecific skin eruption 2020    Rash in adult    Rash and other nonspecific skin eruption 2021    Rash    Rash and other nonspecific skin eruption 2022    Rash       Past Surgical History:   Procedure Laterality Date    OTHER SURGICAL HISTORY  2020    Hysterectomy    OTHER SURGICAL HISTORY  2020     section    OTHER SURGICAL HISTORY  2020    Cholelithotomy    OTHER SURGICAL HISTORY  2020    Hernia repair    OTHER SURGICAL HISTORY  2020    Lower extremity amputation    OTHER SURGICAL HISTORY  2022    Ventral hernia repair    OTHER SURGICAL HISTORY  2022    Colonoscopy       Social History     Socioeconomic History    Marital status:    Tobacco Use    Smoking status: Never     Passive exposure: Never    Smokeless tobacco: Never   Substance and Sexual Activity    Alcohol use: Not Currently    Drug use: Never     Social Drivers of Health     Financial Resource Strain: Low Risk  (2024)    Received from [facility]    Overall Financial Resource Strain (CARDIA)     Difficulty of Paying Living Expenses: Not very hard   Food Insecurity: No Food Insecurity (2024)    Received from [facility]    Hunger Vital Sign     Worried About Running Out of Food in the Last Year: Never true     Ran Out of Food in the Last Year: Never true   Transportation Needs: No Transportation Needs (2024)    Received from [facility]    PRAPARE -  Transportation     Lack of Transportation (Medical): No     Lack of Transportation (Non-Medical): No   Physical Activity: Insufficiently Active (2/1/2024)    Received from [facility]    Exercise Vital Sign     Days of Exercise per Week: 1 day     Minutes of Exercise per Session: 10 min   Stress: Stress Concern Present (2/1/2024)    Received from [facility]    [employer] - Occupational Stress Questionnaire     Feeling of Stress : Very much   Social Connections: Moderately Integrated (2/1/2024)    Received from [facility]    Social Connection and Isolation Panel [NHANES]     Frequency of Communication with Friends and Family: Three times a week     Frequency of Social Gatherings with Friends and Family: Once a week     Attends Scientologist Services: 1 to 4 times per year     Active Member of Clubs or Organizations: No     Attends Club or Organization Meetings: Never     Marital Status:    Intimate Partner Violence: Not At Risk (2/1/2024)    Received from [facility]    Humiliation, Afraid, Rape, and Kick questionnaire     Fear of Current or Ex-Partner: No     Emotionally Abused: No     Physically Abused: No     Sexually Abused: No   Housing Stability: Low Risk  (2/1/2024)    Received from [facility]    Housing Stability Vital Sign     Unable to Pay for Housing in the Last Year: No     Number of Places Lived in the Last Year: 1     Unstable Housing in the Last Year: No       Allergies   Allergen Reactions    Iodinated Contrast Media Unknown    Iodine Nausea Only and Rash         Current Outpatient Medications:     blood sugar diagnostic (Blood Glucose Test) strip, USE as DIRECTED, Disp: 100 strip, Rfl: 1    blood sugar diagnostic (True Metrix Glucose Test Strip) strip, 1 strip 3 times a day., Disp: 100 strip, Rfl: 3    gabapentin (Neurontin) 600 mg tablet, Take 1 tablet (600 mg) by mouth 2 times a day., Disp: 180  tablet, Rfl: 1    metoprolol succinate XL (Toprol-XL) 25 mg 24 hr tablet, Take 1 tablet (25 mg) by mouth once daily. DO NOT CRUSH OR CHEW, Disp: 90 tablet, Rfl: 1    Mounjaro 5 mg/0.5 mL pen injector, Inject 5 mg under the skin 1 (one) time per week., Disp: 2 mL, Rfl: 0    sertraline (Zoloft) 50 mg tablet, Take 1 tablet (50 mg) by mouth once daily., Disp: 90 tablet, Rfl: 1    simvastatin (Zocor) 10 mg tablet, Take 1 tablet (10 mg) by mouth once daily at bedtime., Disp: 90 tablet, Rfl: 1    thyroid, pork, (NP Thyroid) 30 mg tablet, Take 1 tablet (30 mg) by mouth once daily., Disp: 90 tablet, Rfl: 1    tirzepatide (Mounjaro) 5 mg/0.5 mL pen injector, Inject 5 mg under the skin 1 (one) time per week., Disp: 2 mL, Rfl: 1    traMADol (Ultram) 50 mg tablet, Take 1 tablet (50 mg) by mouth every 6 hours if needed for severe pain (7 - 10)., Disp: 30 tablet, Rfl: 0    triamcinolone (Kenalog) 0.1 % cream, Apply topically 2 times a day. Apply to affected area 1-2 times daily as needed., Disp: 453.6 g, Rfl: 0     Objective    BSA: There is no height or weight on file to calculate BSA.  LMP  (LMP Unknown)     Performance Status:  The ECOG performance scale today is ECO- Fully active, able to carry on all pre-disease performance w/o restriction.    Physical Exam  Vitals reviewed.   Constitutional:       General: She is awake. She is not in acute distress.     Appearance: Normal appearance. She is not ill-appearing.   HENT:      Mouth/Throat:      Pharynx: Oropharynx is clear. No oropharyngeal exudate.   Eyes:      General: No scleral icterus.     Conjunctiva/sclera: Conjunctivae normal.   Neck:      Trachea: Trachea and phonation normal. No tracheal tenderness.   Cardiovascular:      Rate and Rhythm: Normal rate and regular rhythm.      Heart sounds: No murmur heard.     No friction rub. No gallop.   Pulmonary:      Effort: Pulmonary effort is normal. No respiratory distress.      Breath sounds: Normal breath sounds. No  stridor. No wheezing, rhonchi or rales.   Chest:      Chest wall: No mass, lacerations, deformity or tenderness.   Breasts:     Right: Skin change (due to xrt and surgery) present. No swelling, mass, nipple discharge or tenderness.      Left: No swelling, mass, nipple discharge, skin change or tenderness.   Abdominal:      General: Abdomen is flat. There is no distension.      Palpations: Abdomen is soft. There is no mass.      Tenderness: There is no abdominal tenderness.   Lymphadenopathy:      Cervical: No cervical adenopathy.      Upper Body:      Right upper body: No supraclavicular, axillary or pectoral adenopathy.      Left upper body: No supraclavicular, axillary or pectoral adenopathy.   Skin:     General: Skin is warm and dry.      Coloration: Skin is not jaundiced.      Findings: No lesion or rash.   Neurological:      General: No focal deficit present.      Mental Status: She is alert and oriented to person, place, and time.      Motor: No weakness.      Gait: Gait normal.   Psychiatric:         Mood and Affect: Mood normal.         Thought Content: Thought content normal.         Judgment: Judgment normal.         Laboratory Data:  Lab Results   Component Value Date    WBC 8.2 02/13/2024    HGB 13.8 02/13/2024    HCT 40.7 02/13/2024    MCV 94 02/13/2024     02/13/2024       Chemistry    Lab Results   Component Value Date/Time     02/13/2024 0946    K 4.6 02/13/2024 0946     02/13/2024 0946    CO2 29 02/13/2024 0946    BUN 25 (H) 02/13/2024 0946    CREATININE 1.23 (H) 02/13/2024 0946    Lab Results   Component Value Date/Time    CALCIUM 9.7 02/13/2024 0946    ALKPHOS 79 02/13/2024 0946    AST 24 02/13/2024 0946    ALT 23 02/13/2024 0946    BILITOT 0.5 02/13/2024 0946             Radiology:  FL guided aspiration or injection large joint right  Narrative: Interpreted By:  Oliverio Donato,   STUDY:  FL GUIDED ASPIRATION OR INJECTION LARGE JOINT RIGHT;  7/12/2024 2:59  pm       INDICATION:  Signs/Symptoms:right hip pain.      COMPARISON:  None.      ACCESSION NUMBER(S):  [id number]      ORDERING CLINICIAN:  BHAVYA WILLOUGHBY      FINDINGS:  The risks, benefits and alternatives of the procedure were discussed  with the patient. The patient was given the chance to ask questions,  and all were answered prior to proceeding. At this time, written  informed consent was obtained. The patient was placed in the supine  position on the fluoroscopic table and was prepped and draped in the  usual sterile fashion. The righthipjoint was localized under  fluoroscopy. Lidocaine was used for local anesthesia. Under  fluoroscopic guidance a 20 gauge needle was inserted into the  righthipjoint. No Omnipaque contrast was administered due to  patient's contrast allergy. Subsequently 40 mg of Kenalog followed by  6 cc solution of Bupivacaine and lidocaine was injected into the  righthip.      The patient tolerated the procedure well and no immediate  complication was noted.      Impression: Successful fluoroscopic guided righthip therapeutic joint injection.          Signed by: Oliverio Donato 7/12/2024 4:20 PM  Dictation workstation:   [id number]       BI MAMMO BILATERAL SCREENING TOMOSYNTHESIS     Narrative  MRN: [id number]  Patient Name: ED ZUNIGA    STUDY:  DIGITAL MAMM SCREENING W/ MARIIA;  1/18/2023 11:50 am    ACCESSION NUMBER(S):  [id number]    ORDERING CLINICIAN:  BHAVYA WILLOUGHBY    INDICATION:  Screening. Benign right core biopsy. Right breast itching.    COMPARISON:  02/11/2021, 11/06/2017, 07/28/2015    FINDINGS:  2D and tomosynthesis images were reviewed at 1 mm slice thickness.    There are areas of scattered fibroglandular tissue.  There is a  spiculated mass in the upper outer right breast at middle depth.  Bilateral benign similar-appearing circumscribed and obscured masses  are noted. No suspicious masses or calcifications are identified in  the left breast.    IMPRESSION:  1. Right breast  spiculated mass. Targeted ultrasound is recommended.  2. Right breast itching. Clinical correlation is recommended. There  is a focal area of concern, targeted ultrasound is recommended.  3. No mammographic evidence of malignancy in the left breast.    BI-RADS CATEGORY:    Category: 0 - Incomplete; Need Additional Imaging Evaluation and/or  Prior Mammograms for Comparison.  Recommendation: Ultrasound Recommended.    For any future breast imaging appointments, please call [phone]).    Patient letter sent SADEVAL      Electronically signed by: SCOTT HA MD          Assessment/Plan:    Alejandra Chowdhury is a 69 y.o. female with a history of right ILC breast cancer, who presents today follow-up evaluation.    Invasive Lobular Carcinoma   Due to side effects from anastrozole and exemestane (kidney dysfunction) on observation only   Mammogram 1/2024: cat 4 - bx negative -fat necrosis & hemosiderin-laden macrophage. Continue yearly screenings.   - Mammogram due next after 1/23/2025, order today   - Breast massage encouraged.     There is no evidence of breast cancer recurrence based on her clinical exam today.       Assess/Plan SmartLinks:   Problem List Items Addressed This Visit    None      Disposition.  - RTC 6 months with Misty Fiore PA-C   - She was given our contact information and instructed to call with concerns/questions in the interim.    No orders of the defined types were placed in this encounter.     Misty Fiore PA-C  Hematology and Medical Oncology  [facility]

## 2024-10-30 ENCOUNTER — APPOINTMENT (OUTPATIENT)
Dept: PRIMARY CARE | Facility: CLINIC | Age: 69
End: 2024-10-30
Payer: MEDICARE

## 2024-10-30 DIAGNOSIS — E11.69 DM TYPE 2 WITH DIABETIC DYSLIPIDEMIA (MULTI): ICD-10-CM

## 2024-10-30 DIAGNOSIS — F32.9 REACTIVE DEPRESSION: ICD-10-CM

## 2024-10-30 DIAGNOSIS — I10 BENIGN ESSENTIAL HYPERTENSION: Primary | ICD-10-CM

## 2024-10-30 DIAGNOSIS — Z89.512 STATUS POST BELOW-KNEE AMPUTATION OF LEFT LOWER EXTREMITY (MULTI): ICD-10-CM

## 2024-10-30 DIAGNOSIS — E78.5 DM TYPE 2 WITH DIABETIC DYSLIPIDEMIA (MULTI): ICD-10-CM

## 2024-10-30 PROCEDURE — G2211 COMPLEX E/M VISIT ADD ON: HCPCS | Performed by: FAMILY MEDICINE

## 2024-10-30 PROCEDURE — 99213 OFFICE O/P EST LOW 20 MIN: CPT | Performed by: FAMILY MEDICINE

## 2024-10-30 RX ORDER — TIRZEPATIDE 2.5 MG/.5ML
2.5 INJECTION, SOLUTION SUBCUTANEOUS
Qty: 2 ML | Refills: 0 | Status: CANCELLED | OUTPATIENT
Start: 2024-10-30

## 2024-10-30 RX ORDER — TIRZEPATIDE 5 MG/.5ML
5 INJECTION, SOLUTION SUBCUTANEOUS WEEKLY
Qty: 2 ML | Refills: 1 | Status: SHIPPED | OUTPATIENT
Start: 2024-10-30

## 2024-11-03 PROBLEM — N64.59 BREAST COMPLAINT: Status: RESOLVED | Noted: 2023-02-05 | Resolved: 2024-11-03

## 2024-11-03 ASSESSMENT — ENCOUNTER SYMPTOMS
FEVER: 0
MYALGIAS: 0
PHOTOPHOBIA: 0
ABDOMINAL PAIN: 0
PALPITATIONS: 0
RECTAL PAIN: 0
COUGH: 0
ABDOMINAL DISTENTION: 0
SINUS PAIN: 0
STRIDOR: 0
HEMATURIA: 0
DECREASED CONCENTRATION: 0
SINUS PRESSURE: 0
SEIZURES: 0
ARTHRALGIAS: 0
FATIGUE: 0
SLEEP DISTURBANCE: 0
RHINORRHEA: 0
EYE PAIN: 0
DYSPHORIC MOOD: 0
DIZZINESS: 0
POLYDIPSIA: 0
CONSTIPATION: 0
NERVOUS/ANXIOUS: 0
DYSURIA: 0
SPEECH DIFFICULTY: 0
BLOOD IN STOOL: 0
ADENOPATHY: 0
POLYPHAGIA: 0
COLOR CHANGE: 0
FLANK PAIN: 0
HEADACHES: 0
DIARRHEA: 0
NECK STIFFNESS: 0
SHORTNESS OF BREATH: 0
AGITATION: 0
CONSTITUTIONAL NEGATIVE: 1
TROUBLE SWALLOWING: 0
SORE THROAT: 0
CONFUSION: 0
APPETITE CHANGE: 0
ACTIVITY CHANGE: 0
CHEST TIGHTNESS: 0

## 2024-11-05 ENCOUNTER — OFFICE VISIT (OUTPATIENT)
Dept: HEMATOLOGY/ONCOLOGY | Facility: CLINIC | Age: 69
End: 2024-11-05
Payer: MEDICARE

## 2024-11-05 VITALS
RESPIRATION RATE: 18 BRPM | OXYGEN SATURATION: 93 % | TEMPERATURE: 97.3 F | BODY MASS INDEX: 25.56 KG/M2 | WEIGHT: 139.77 LBS | HEART RATE: 73 BPM | SYSTOLIC BLOOD PRESSURE: 112 MMHG | DIASTOLIC BLOOD PRESSURE: 71 MMHG

## 2024-11-05 DIAGNOSIS — Z12.31 SCREENING MAMMOGRAM FOR BREAST CANCER: Primary | ICD-10-CM

## 2024-11-05 DIAGNOSIS — C50.919 INVASIVE LOBULAR CARCINOMA OF BREAST IN FEMALE: ICD-10-CM

## 2024-11-05 PROCEDURE — 1123F ACP DISCUSS/DSCN MKR DOCD: CPT

## 2024-11-05 PROCEDURE — 1126F AMNT PAIN NOTED NONE PRSNT: CPT

## 2024-11-05 PROCEDURE — 3060F POS MICROALBUMINURIA REV: CPT

## 2024-11-05 PROCEDURE — 1159F MED LIST DOCD IN RCRD: CPT

## 2024-11-05 PROCEDURE — 3078F DIAST BP <80 MM HG: CPT

## 2024-11-05 PROCEDURE — 99213 OFFICE O/P EST LOW 20 MIN: CPT

## 2024-11-05 PROCEDURE — 1157F ADVNC CARE PLAN IN RCRD: CPT

## 2024-11-05 PROCEDURE — 3074F SYST BP LT 130 MM HG: CPT

## 2024-11-05 ASSESSMENT — PAIN SCALES - GENERAL: PAINLEVEL_OUTOF10: 0-NO PAIN

## 2024-11-06 ENCOUNTER — APPOINTMENT (OUTPATIENT)
Dept: PRIMARY CARE | Facility: CLINIC | Age: 69
End: 2024-11-06
Payer: MEDICARE

## 2024-11-13 ENCOUNTER — LAB (OUTPATIENT)
Dept: LAB | Facility: LAB | Age: 69
End: 2024-11-13
Payer: MEDICARE

## 2024-11-13 DIAGNOSIS — N25.81 SECONDARY HYPERPARATHYROIDISM OF RENAL ORIGIN (MULTI): ICD-10-CM

## 2024-11-13 DIAGNOSIS — E78.5 DM TYPE 2 WITH DIABETIC DYSLIPIDEMIA (MULTI): ICD-10-CM

## 2024-11-13 DIAGNOSIS — E11.21 TYPE 2 DIABETES MELLITUS WITH DIABETIC NEPHROPATHY: ICD-10-CM

## 2024-11-13 DIAGNOSIS — E11.69 DM TYPE 2 WITH DIABETIC DYSLIPIDEMIA (MULTI): ICD-10-CM

## 2024-11-13 DIAGNOSIS — N18.32 CHRONIC KIDNEY DISEASE, STAGE 3B (MULTI): Primary | ICD-10-CM

## 2024-11-13 DIAGNOSIS — E55.9 VITAMIN D DEFICIENCY, UNSPECIFIED: ICD-10-CM

## 2024-11-13 LAB
25(OH)D3 SERPL-MCNC: 29 NG/ML (ref 30–100)
ALBUMIN SERPL BCP-MCNC: 4.1 G/DL (ref 3.4–5)
ALP SERPL-CCNC: 80 U/L (ref 33–136)
ALT SERPL W P-5'-P-CCNC: 14 U/L (ref 7–45)
ANION GAP SERPL CALC-SCNC: 11 MMOL/L (ref 10–20)
AST SERPL W P-5'-P-CCNC: 18 U/L (ref 9–39)
BILIRUB SERPL-MCNC: 0.6 MG/DL (ref 0–1.2)
BUN SERPL-MCNC: 22 MG/DL (ref 6–23)
CALCIUM SERPL-MCNC: 9.1 MG/DL (ref 8.6–10.3)
CHLORIDE SERPL-SCNC: 101 MMOL/L (ref 98–107)
CHOLEST SERPL-MCNC: 174 MG/DL (ref 0–199)
CHOLESTEROL/HDL RATIO: 3.8
CO2 SERPL-SCNC: 31 MMOL/L (ref 21–32)
CREAT SERPL-MCNC: 1.23 MG/DL (ref 0.5–1.05)
CREAT UR-MCNC: 147.8 MG/DL (ref 20–320)
EGFRCR SERPLBLD CKD-EPI 2021: 48 ML/MIN/1.73M*2
ERYTHROCYTE [DISTWIDTH] IN BLOOD BY AUTOMATED COUNT: 13.1 % (ref 11.5–14.5)
EST. AVERAGE GLUCOSE BLD GHB EST-MCNC: 105 MG/DL
GLUCOSE SERPL-MCNC: 86 MG/DL (ref 74–99)
HBA1C MFR BLD: 5.3 %
HCT VFR BLD AUTO: 42.3 % (ref 36–46)
HDLC SERPL-MCNC: 45.7 MG/DL
HGB BLD-MCNC: 13.7 G/DL (ref 12–16)
LDLC SERPL CALC-MCNC: 72 MG/DL
MCH RBC QN AUTO: 30.9 PG (ref 26–34)
MCHC RBC AUTO-ENTMCNC: 32.4 G/DL (ref 32–36)
MCV RBC AUTO: 95 FL (ref 80–100)
MICROALBUMIN UR-MCNC: 20.9 MG/L
MICROALBUMIN/CREAT UR: 14.1 UG/MG CREAT
NON HDL CHOLESTEROL: 128 MG/DL (ref 0–149)
NRBC BLD-RTO: 0 /100 WBCS (ref 0–0)
PHOSPHATE SERPL-MCNC: 4 MG/DL (ref 2.5–4.9)
PLATELET # BLD AUTO: 253 X10*3/UL (ref 150–450)
POTASSIUM SERPL-SCNC: 4.1 MMOL/L (ref 3.5–5.3)
PROT SERPL-MCNC: 6.8 G/DL (ref 6.4–8.2)
PTH-INTACT SERPL-MCNC: 28.9 PG/ML (ref 18.5–88)
RBC # BLD AUTO: 4.44 X10*6/UL (ref 4–5.2)
SODIUM SERPL-SCNC: 139 MMOL/L (ref 136–145)
TRIGL SERPL-MCNC: 283 MG/DL (ref 0–149)
VLDL: 57 MG/DL (ref 0–40)
WBC # BLD AUTO: 8 X10*3/UL (ref 4.4–11.3)

## 2024-11-13 PROCEDURE — 83970 ASSAY OF PARATHORMONE: CPT

## 2024-11-13 PROCEDURE — 85027 COMPLETE CBC AUTOMATED: CPT

## 2024-11-13 PROCEDURE — 82043 UR ALBUMIN QUANTITATIVE: CPT

## 2024-11-13 PROCEDURE — 82570 ASSAY OF URINE CREATININE: CPT

## 2024-11-13 PROCEDURE — 36415 COLL VENOUS BLD VENIPUNCTURE: CPT

## 2024-11-13 PROCEDURE — 80053 COMPREHEN METABOLIC PANEL: CPT

## 2024-11-13 PROCEDURE — 82306 VITAMIN D 25 HYDROXY: CPT

## 2024-11-13 PROCEDURE — 84100 ASSAY OF PHOSPHORUS: CPT

## 2024-11-13 PROCEDURE — 83036 HEMOGLOBIN GLYCOSYLATED A1C: CPT

## 2024-11-13 PROCEDURE — 80061 LIPID PANEL: CPT

## 2024-12-13 ENCOUNTER — HOSPITAL ENCOUNTER (EMERGENCY)
Facility: HOSPITAL | Age: 69
Discharge: HOME | End: 2024-12-13
Payer: MEDICARE

## 2024-12-13 VITALS
DIASTOLIC BLOOD PRESSURE: 65 MMHG | HEART RATE: 74 BPM | TEMPERATURE: 96.6 F | OXYGEN SATURATION: 98 % | SYSTOLIC BLOOD PRESSURE: 128 MMHG | HEIGHT: 62 IN | BODY MASS INDEX: 25.03 KG/M2 | RESPIRATION RATE: 18 BRPM | WEIGHT: 136 LBS

## 2024-12-13 DIAGNOSIS — S01.511A LIP LACERATION, INITIAL ENCOUNTER: Primary | ICD-10-CM

## 2024-12-13 DIAGNOSIS — S09.93XA DENTAL INJURY, INITIAL ENCOUNTER: ICD-10-CM

## 2024-12-13 PROCEDURE — 99282 EMERGENCY DEPT VISIT SF MDM: CPT

## 2024-12-13 PROCEDURE — 12011 RPR F/E/E/N/L/M 2.5 CM/<: CPT

## 2024-12-13 RX ORDER — PENICILLIN V POTASSIUM 500 MG/1
500 TABLET, FILM COATED ORAL 4 TIMES DAILY
Qty: 28 TABLET | Refills: 0 | Status: SHIPPED | OUTPATIENT
Start: 2024-12-13 | End: 2024-12-20

## 2024-12-13 ASSESSMENT — COLUMBIA-SUICIDE SEVERITY RATING SCALE - C-SSRS
2. HAVE YOU ACTUALLY HAD ANY THOUGHTS OF KILLING YOURSELF?: NO
6. HAVE YOU EVER DONE ANYTHING, STARTED TO DO ANYTHING, OR PREPARED TO DO ANYTHING TO END YOUR LIFE?: NO
1. IN THE PAST MONTH, HAVE YOU WISHED YOU WERE DEAD OR WISHED YOU COULD GO TO SLEEP AND NOT WAKE UP?: NO

## 2024-12-13 ASSESSMENT — LIFESTYLE VARIABLES
HAVE PEOPLE ANNOYED YOU BY CRITICIZING YOUR DRINKING: NO
EVER FELT BAD OR GUILTY ABOUT YOUR DRINKING: NO
EVER HAD A DRINK FIRST THING IN THE MORNING TO STEADY YOUR NERVES TO GET RID OF A HANGOVER: NO
HAVE YOU EVER FELT YOU SHOULD CUT DOWN ON YOUR DRINKING: NO
TOTAL SCORE: 0

## 2024-12-13 ASSESSMENT — PAIN - FUNCTIONAL ASSESSMENT: PAIN_FUNCTIONAL_ASSESSMENT: 0-10

## 2024-12-13 ASSESSMENT — PAIN SCALES - GENERAL: PAINLEVEL_OUTOF10: 3

## 2024-12-13 NOTE — ED PROVIDER NOTES
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  Broke tooth and cut lip. No LOC.  No thinners.       Patient presents with lip injury and dental injury due to fall.  States she was out in the barn when she tripped and fell, injuring her upper lip and chipping her tooth.  Denies any loss of conscious.  No headache.  She is not on a blood thinner.  She denies any neck pain.  She was ambulatory after the fall.  She denies any other areas of discomfort.      History provided by:  Patient          Patient History   Past Medical History:   Diagnosis Date    Acute gastritis with bleeding 04/10/2021    Acute superficial gastritis with hemorrhage    Allergic contact dermatitis, unspecified cause 05/22/2022    Allergic dermatitis    Cutaneous abscess of groin 10/04/2021    Abscess of groin    Cutaneous abscess of left axilla 10/20/2021    Abscess of axilla, left    Disorder of kidney and ureter, unspecified 10/25/2021    Low kidney function    Effusion, right foot 03/18/2021    Swelling of first metatarsophalangeal (MTP) joint of right foot    Encounter for general adult medical examination without abnormal findings 02/08/2021    Encounter for Medicare annual wellness exam    Encounter for general adult medical examination without abnormal findings 05/18/2022    Encounter for Medicare annual wellness exam    Encounter for other screening for malignant neoplasm of breast     Breast cancer screening    Encounter for screening for malignant neoplasm of colon 09/23/2021    Encounter for colorectal cancer screening    Nonspecific lymphadenitis, unspecified 10/20/2021    Axillary adenitis    Otitis media, unspecified, unspecified ear 01/09/2020    Ear infection    Pain in right toe(s) 03/18/2021    Pain of right great toe    Pain in unspecified joint 05/08/2020    Multiple joint pain    Personal history of diseases of the skin and subcutaneous " tissue 10/20/2021    History of folliculitis    Personal history of diseases of the skin and subcutaneous tissue 2020    History of acute dermatitis    Personal history of diseases of the skin and subcutaneous tissue 2020    History of skin pruritus    Personal history of other drug therapy 2021    History of influenza vaccination    Personal history of other drug therapy 2020    History of influenza vaccination    Personal history of other endocrine, nutritional and metabolic disease 2021    History of hyperglycemia    Personal history of other specified conditions 2022    History of diarrhea    Personal history of other specified conditions 2022    History of vomiting    Rash and other nonspecific skin eruption 2020    Rash in adult    Rash and other nonspecific skin eruption 2021    Rash    Rash and other nonspecific skin eruption 2022    Rash     Past Surgical History:   Procedure Laterality Date    OTHER SURGICAL HISTORY  2020    Hysterectomy    OTHER SURGICAL HISTORY  2020     section    OTHER SURGICAL HISTORY  2020    Cholelithotomy    OTHER SURGICAL HISTORY  2020    Hernia repair    OTHER SURGICAL HISTORY  2020    Lower extremity amputation    OTHER SURGICAL HISTORY  2022    Ventral hernia repair    OTHER SURGICAL HISTORY  2022    Colonoscopy     Family History   Problem Relation Name Age of Onset    Heart disease Mother          CVA    Other (cardiac disorder) Mother      Other (cva) Mother      Heart disease Father          CVA    Other (cardiac disorder) Father      Other (cva) Father       Social History     Tobacco Use    Smoking status: Never     Passive exposure: Never    Smokeless tobacco: Never   Substance Use Topics    Alcohol use: Never    Drug use: Never       Physical Exam   ED Triage Vitals [24 1433]   Temperature Heart Rate Respirations BP   35.9 °C (96.6 °F) 74 18 128/65       Pulse Ox Temp Source Heart Rate Source Patient Position   98 % Temporal Monitor Sitting      BP Location FiO2 (%)     Right arm --       Physical Exam  Vitals and nursing note reviewed.   Constitutional:       General: She is not in acute distress.     Appearance: Normal appearance. She is well-developed, well-groomed and normal weight. She is not ill-appearing or toxic-appearing.   HENT:      Head: Normocephalic.      Right Ear: Ear canal and external ear normal.      Left Ear: Ear canal and external ear normal.      Nose: Nose normal.      Mouth/Throat:      Mouth: Mucous membranes are moist.      Pharynx: Oropharynx is clear. No oropharyngeal exudate.     Eyes:      General: No scleral icterus.     Conjunctiva/sclera: Conjunctivae normal.   Cardiovascular:      Rate and Rhythm: Normal rate and regular rhythm.      Pulses:           Radial pulses are 2+ on the right side and 2+ on the left side.      Heart sounds: Normal heart sounds.   Pulmonary:      Effort: Pulmonary effort is normal.      Breath sounds: Normal breath sounds and air entry.   Chest:      Chest wall: No tenderness or crepitus.   Abdominal:      General: Bowel sounds are normal. There is no distension.      Palpations: Abdomen is soft.      Tenderness: There is no abdominal tenderness. There is no right CVA tenderness, left CVA tenderness or guarding.   Musculoskeletal:      Cervical back: No spinous process tenderness or muscular tenderness.      Right lower leg: No edema.      Left lower leg: No edema.      Comments: Palpating upper and lower extremities does not elicit any areas of tenderness.  No midline axial spine tenderness on exam.  No step-off deformity.   Skin:     General: Skin is warm.      Capillary Refill: Capillary refill takes less than 2 seconds.   Neurological:      General: No focal deficit present.      Mental Status: She is alert and oriented to person, place, and time.      GCS: GCS eye subscore is 4. GCS verbal subscore is  5. GCS motor subscore is 6.      Cranial Nerves: No cranial nerve deficit or facial asymmetry.      Sensory: No sensory deficit.      Motor: No weakness.      Gait: Gait normal.   Psychiatric:         Attention and Perception: Attention and perception normal.         Mood and Affect: Mood and affect normal.         Speech: Speech normal.         Behavior: Behavior normal. Behavior is cooperative.         Thought Content: Thought content normal.         Cognition and Memory: Cognition and memory normal.         Judgment: Judgment normal.           ED Course & MDM   Diagnoses as of 12/13/24 1504   Lip laceration, initial encounter   Dental injury, initial encounter                 No data recorded                                 Medical Decision Making  Patient presents with lip injury and dental injury due to fall.  States she was out in the barn when she tripped and fell, injuring her upper lip and chipping her tooth.  Denies any loss of conscious.  No headache.  She is not on a blood thinner.  She denies any neck pain.  She was ambulatory after the fall.  She denies any other areas of discomfort.    Ddx: Fracture, contusion, strain, sprain, other    Laceration repair per procedure note  Patient placed on penicillin to help keep the mouth clean prior to seeing the dentist.  Patient encouraged to see a dentist as soon as possible.  Follow-up with family care provider in the next few days.  Return with any worsening symptoms or concerns.  Patient discharged home in improved stable condition        Procedure  Laceration Repair    Performed by: Ramila Raymond PA-C  Authorized by: Charli Garcia DO    Consent:     Consent obtained:  Verbal    Consent given by:  Patient    Risks, benefits, and alternatives were discussed: yes      Risks discussed:  Infection, pain, retained foreign body, tendon damage, vascular damage, poor wound healing, poor cosmetic result, need for additional repair and nerve damage    Alternatives  discussed:  No treatment, delayed treatment, observation and referral  Universal protocol:     Procedure explained and questions answered to patient or proxy's satisfaction: yes      Patient identity confirmed:  Verbally with patient  Laceration details:     Location:  Lip    Lip location: Upper lip with and the mucosal surface.    Length (cm):  0.5    Depth (mm):  1  Pre-procedure details:     Preparation:  Patient was prepped and draped in usual sterile fashion  Exploration:     Limited defect created (wound extended): no      Hemostasis achieved with:  Direct pressure    Contaminated: no    Treatment:     Area cleansed with:  Povidone-iodine    Amount of cleaning:  Standard    Irrigation solution:  Sterile saline    Irrigation volume:  5    Visualized foreign bodies/material removed: no      Debridement:  None    Undermining:  None  Skin repair:     Repair method:  Tissue adhesive  Approximation:     Approximation:  Close  Repair type:     Repair type:  Simple  Post-procedure details:     Dressing:  Open (no dressing)    Procedure completion:  Tolerated well, no immediate complications       Ramila Raymond PA-C  12/13/24 [id number]

## 2024-12-17 DIAGNOSIS — E11.69 DM TYPE 2 WITH DIABETIC DYSLIPIDEMIA (MULTI): ICD-10-CM

## 2024-12-17 DIAGNOSIS — E78.5 DM TYPE 2 WITH DIABETIC DYSLIPIDEMIA (MULTI): ICD-10-CM

## 2024-12-17 RX ORDER — TIRZEPATIDE 5 MG/.5ML
5 INJECTION, SOLUTION SUBCUTANEOUS WEEKLY
Qty: 2 ML | Refills: 1 | Status: SHIPPED | OUTPATIENT
Start: 2024-12-17

## 2025-01-21 ENCOUNTER — OFFICE VISIT (OUTPATIENT)
Dept: PRIMARY CARE | Facility: CLINIC | Age: 70
End: 2025-01-21
Payer: MEDICARE

## 2025-01-21 VITALS
BODY MASS INDEX: 25.28 KG/M2 | WEIGHT: 137.4 LBS | DIASTOLIC BLOOD PRESSURE: 54 MMHG | RESPIRATION RATE: 16 BRPM | TEMPERATURE: 97.3 F | SYSTOLIC BLOOD PRESSURE: 112 MMHG | HEIGHT: 62 IN | HEART RATE: 54 BPM | OXYGEN SATURATION: 99 %

## 2025-01-21 DIAGNOSIS — L29.89 PRURITIC ERYTHEMATOUS RASH: ICD-10-CM

## 2025-01-21 DIAGNOSIS — L30.9 DERMATITIS: Primary | ICD-10-CM

## 2025-01-21 DIAGNOSIS — Z18.9 RETAINED SUTURE, INITIAL ENCOUNTER: ICD-10-CM

## 2025-01-21 DIAGNOSIS — L29.9 ITCHY SKIN: ICD-10-CM

## 2025-01-21 DIAGNOSIS — T81.89XA RETAINED SUTURE, INITIAL ENCOUNTER: ICD-10-CM

## 2025-01-21 PROCEDURE — 1123F ACP DISCUSS/DSCN MKR DOCD: CPT | Performed by: NURSE PRACTITIONER

## 2025-01-21 PROCEDURE — 3078F DIAST BP <80 MM HG: CPT | Performed by: NURSE PRACTITIONER

## 2025-01-21 PROCEDURE — 99213 OFFICE O/P EST LOW 20 MIN: CPT | Performed by: NURSE PRACTITIONER

## 2025-01-21 PROCEDURE — 1159F MED LIST DOCD IN RCRD: CPT | Performed by: NURSE PRACTITIONER

## 2025-01-21 PROCEDURE — 1160F RVW MEDS BY RX/DR IN RCRD: CPT | Performed by: NURSE PRACTITIONER

## 2025-01-21 PROCEDURE — 1036F TOBACCO NON-USER: CPT | Performed by: NURSE PRACTITIONER

## 2025-01-21 PROCEDURE — 1157F ADVNC CARE PLAN IN RCRD: CPT | Performed by: NURSE PRACTITIONER

## 2025-01-21 PROCEDURE — 3008F BODY MASS INDEX DOCD: CPT | Performed by: NURSE PRACTITIONER

## 2025-01-21 PROCEDURE — 3074F SYST BP LT 130 MM HG: CPT | Performed by: NURSE PRACTITIONER

## 2025-01-21 RX ORDER — HYDROCORTISONE 25 MG/G
CREAM TOPICAL 2 TIMES DAILY PRN
Qty: 30 G | Refills: 2 | Status: SHIPPED | OUTPATIENT
Start: 2025-01-21 | End: 2026-01-21

## 2025-01-21 ASSESSMENT — PATIENT HEALTH QUESTIONNAIRE - PHQ9
2. FEELING DOWN, DEPRESSED OR HOPELESS: NOT AT ALL
1. LITTLE INTEREST OR PLEASURE IN DOING THINGS: NOT AT ALL
SUM OF ALL RESPONSES TO PHQ9 QUESTIONS 1 AND 2: 0

## 2025-01-21 NOTE — PROGRESS NOTES
Subjective   Patient ID: Benoit Chowdhury is a 69 y.o. female who is with complaints of:  Multiple itchy red skin rash on the areas of left side of upper abdomen, sides, and back   Appearance of small piece of suture on the skin of left side of abdomen    HPI   Patient is a 69 y.o. female who CONSULTED AT [facility] today.   Patient is with complaint multiple itchy red skin rash on the areas of left side of upper abdomen, sides, and back which started about 2 months ago. she states that the rash are itchy, not painful, with no discharge, and no bleeding. she tried OTC medications but it afforded no relief of symptoms. she denies any wheezing, shortness of breath, change in voice, nor chest pain at present. she denies fever, chills, cough, nor runny nose. she denies any other signs or symptoms.     She also has a complaint of a small piece of suture that she noticed coming out of her skin on the left side of abdomen today. She has no pain, no discharge, no bleeding. She states that there is pain when she tagged on the suture. She states she had laparoscopic surgery for abdominal (ventral) hernia a long time ago. She has no fever nor chills.    Review of Systems  General: no weight loss, generally healthy, no fatigue  Head:  no headaches / sinus pain, no vertigo, no injury  Eyes: no diplopia, no tearing, no pain,   Ears: no change in hearing, no tinnitus, no bleeding, no vertigo  Mouth:  no dental difficulties, no gingival bleeding, no sore throat, no loss of sense of taste  Nose: no congestion, no  discharge, no bleeding, no obstruction, no loss of sense of smell  Neck: no stiffness, no pain, no tenderness, no masses, no bruit  Pulmonary: no dyspnea, no wheezing, no hemoptysis, no cough  Cardiovascular: no chest pain, no palpitations, no syncope, no orthopnea  Gastrointestinal: no change in appetite, no dysphagia, no abdominal pains, no diarrhea, no emesis, no melena  Genito Urinary: no dysuria, no  "urinary urgency, no nocturia, no incontinence, no change in nature of urine  Musculoskeletal: no muscle ache, no joint pain, no limitation of range of motion, no paresthesia, no numbness  Skin: (+) multiple itchy red skin rash on the areas of left side of upper abdomen, sides, and back, (+) small piece of suture on the skin of left side of abdomen,  Constitutional: no fever, no chills, no night sweats    Objective   /54   Pulse 54   Temp 36.3 °C (97.3 °F)   Resp 16   Ht 1.575 m (5' 2\")   Wt 62.3 kg (137 lb 6.4 oz)   LMP  (LMP Unknown)   SpO2 99%   BMI 25.13 kg/m²     Physical Exam  General: ambulatory, in no acute distress  Head: normocephalic, no lesions  Eyes: pink palpebral conjunctiva, anicteric sclerae, PERRLA, EOM's full  Nose: nasal mucosa normal, no nasal discharge, no bleeding, no obstruction  Throat: clear, no exudate, no lesions  Neck: supple, no masses, no bruits  Chest: symmetrical chest expansion, no lagging, no retractions, clear breath sounds, no rales, no wheezes  SKIN: (+) multiple maculopapular rash on the areas of left side of upper abdomen, sides, and back,: rashes are erythematous, slightly elevated, measures <1 cm diameter, pruritic, non tender, no discharge, no bleeding, with no lymphatic streaking of skin, with multiple scratch marks on involved areas. (+) small piece of suture on the skin of left side of abdomen, no bleeding, no discharge,    Assessment/Plan   Problem List Items Addressed This Visit    None  Visit Diagnoses         Codes    Dermatitis    -  Primary L30.9    Relevant Medications    hydrocortisone 2.5 % cream    Itchy skin     L29.9    Relevant Medications    hydrocortisone 2.5 % cream    Pruritic erythematous rash     L29.89    Relevant Medications    hydrocortisone 2.5 % cream    Retained suture, initial encounter     T81.89XA, Z18.9        DISCHARGE SUMMARY:   Patient was seen and examined. Diagnosis, treatment, treatment options, and possible complications " of today's illness discussed and explained to patient. Patient to take medication/s associated with this visit. Advised avoidance of known or suspected allergen. Advised hypoallergenic diet. Advised to come back if with worsening or persistent symptoms. Advised to come back if there is wheezing, change in voice quality, shortness of breath or chest pain. Patient verbalized understanding of plan of care.    Patient to come back in 7 - 10 days if needed for worsening symptoms.    Patient assisted by  to obtain information about surgeon who did laparoscopic herniorrhaphy on her. She states she will try to get into his schedule.

## 2025-01-21 NOTE — PROGRESS NOTES
"Subjective   Patient ID: Benoti Chowdhury is a 69 y.o. female who presents for Rash.        Symptoms: rash redness, scabbing itching, pt states it could be a possible stitch from previous surgeries  Length of symptoms: 2 months   OTC: camphor, monistat, Cortizone, triple antibiotic with no help.  Related information:    HPI     Review of Systems    Objective   /54   Pulse 54   Temp 36.3 °C (97.3 °F)   Resp 16   Ht 1.575 m (5' 2\")   Wt 62.3 kg (137 lb 6.4 oz)   LMP  (LMP Unknown)   SpO2 99%   BMI 25.13 kg/m²     Physical Exam    Assessment/Plan          "

## 2025-01-22 NOTE — PATIENT INSTRUCTIONS
DISCHARGE SUMMARY:   Patient was seen and examined. Diagnosis, treatment, treatment options, and possible complications of today's illness discussed and explained to patient. Patient to take medication/s associated with this visit. Advised avoidance of known or suspected allergen. Advised hypoallergenic diet. Advised to come back if with worsening or persistent symptoms. Advised to come back if there is wheezing, change in voice quality, shortness of breath or chest pain. Patient verbalized understanding of plan of care.    Patient to come back in 7 - 10 days if needed for worsening symptoms.    Patient assisted by  to obtain information about surgeon who did laparoscopic herniorrhaphy on her. She states she will try to get into his schedule.

## 2025-01-24 ENCOUNTER — HOSPITAL ENCOUNTER (OUTPATIENT)
Dept: RADIOLOGY | Facility: HOSPITAL | Age: 70
Discharge: HOME | End: 2025-01-24
Payer: MEDICARE

## 2025-01-24 VITALS — WEIGHT: 137.4 LBS | HEIGHT: 62 IN | BODY MASS INDEX: 25.28 KG/M2

## 2025-01-24 DIAGNOSIS — C50.919 INVASIVE LOBULAR CARCINOMA OF BREAST IN FEMALE: ICD-10-CM

## 2025-01-24 DIAGNOSIS — Z12.31 SCREENING MAMMOGRAM FOR BREAST CANCER: ICD-10-CM

## 2025-01-24 PROCEDURE — 77063 BREAST TOMOSYNTHESIS BI: CPT

## 2025-01-27 ENCOUNTER — APPOINTMENT (OUTPATIENT)
Dept: SURGERY | Facility: CLINIC | Age: 70
End: 2025-01-27
Payer: MEDICARE

## 2025-01-27 DIAGNOSIS — Z51.89 VISIT FOR WOUND CHECK: Primary | ICD-10-CM

## 2025-01-27 PROCEDURE — 1159F MED LIST DOCD IN RCRD: CPT | Performed by: SURGERY

## 2025-01-27 PROCEDURE — 99213 OFFICE O/P EST LOW 20 MIN: CPT | Performed by: SURGERY

## 2025-01-27 PROCEDURE — 1160F RVW MEDS BY RX/DR IN RCRD: CPT | Performed by: SURGERY

## 2025-01-27 PROCEDURE — 1123F ACP DISCUSS/DSCN MKR DOCD: CPT | Performed by: SURGERY

## 2025-01-27 NOTE — PROGRESS NOTES
"Subjective   Patient ID: Alejandra Chowdhury \"Jan\" is a 69 y.o. female who presents for Follow-up (Here for stitch coming out).  HPI  69-year-old female patient who underwent laparoscopic repair of incarcerated incisional ventral hernia with mesh on 7/5/2022.  The three 5 mm left-sided trocar sites were approximated with 4-0 Monocryl in subcuticular fashion.  Over a week ago, she noticed red itchy rashes along the left side of her abdomen and also as she was poking around with needle \"I pulled the stitch\".  Per , patient showed him the stitch and as he was pulling on it, it broke off.  She has been on multiple topical steroids but the rashes are still there. Denies trauma.  Denies changes to her detergent or shampoo  Objective   Physical Exam  Gen: no acute distress  CV: RRR  Pulm: CTAB  Abd: soft, non-distended, non-tender; 1-2 mm wound with scab; multiple small petechial rashes along the left abdominal region  Assessment/Plan   Using the scalpel, I removed the scab but could not identify any suture in wound.  I told patient and  that the 5 mm trocar sites were approximated with 4-0 Monocryl which dissolve in 3 to 6 months. I also told them that even if she has retained suture, it should not cause the rashes.  I recommended she follow-up with her primary care doctor or see a dermatologist. Followup as needed.    Roberth Guy MD 01/27/25 9:30 AM   "

## 2025-01-27 NOTE — PATIENT INSTRUCTIONS
I don't see any stitch left behind; call the office if you feel anymore; followup with your PCP for the rash

## 2025-01-28 ENCOUNTER — TELEPHONE (OUTPATIENT)
Dept: PRIMARY CARE | Facility: CLINIC | Age: 70
End: 2025-01-28
Payer: MEDICARE

## 2025-01-28 ENCOUNTER — TELEPHONE (OUTPATIENT)
Dept: HEMATOLOGY/ONCOLOGY | Facility: CLINIC | Age: 70
End: 2025-01-28
Payer: MEDICARE

## 2025-01-28 DIAGNOSIS — R92.8 ABNORMAL SCREENING MAMMOGRAM: Primary | ICD-10-CM

## 2025-01-28 NOTE — TELEPHONE ENCOUNTER
Successfully communicated the following results:     Study Result    Narrative & Impression   Interpreted By:  Andria Uriostegui,   STUDY:  BI MAMMO BILATERAL SCREENING TOMOSYNTHESIS;  1/24/2025 9:19 am      ACCESSION NUMBER(S):  [id number]      ORDERING CLINICIAN:  PREETI SKINNER      INDICATION:  Screening. History of right lumpectomy with radiation in benign right  excisional biopsy. Patient ports a palpable lump in the sternal  region. A cleavage image was taken with radiopaque BB marker at the  area of lump. Patient opted to remain as a screening mammogram.      ,C50.919 Malignant neoplasm of unspecified site of unspecified female  breast,Z12.31 Encounter for screening mammogram for malignant  neoplasm of breast      COMPARISON:  01/23/2024, 03/14/2023, 02/08/2023, 01/18/2023.      FINDINGS:  2D and tomosynthesis images were reviewed at 1 mm slice thickness.      Density:  There are scattered areas of fibroglandular density.      There is redemonstration of postoperative scarring and surgical clips  in the upper outer right breast. There is stable appearance of an  irregular mass with associated tissue marker in the central right  breast at anterior depth, consistent with biopsy proven organizing  fat necrosis. There is a focal asymmetry in the central superior left  breast. Additionally there is questionable distortion in the medial  left breast.      A radiopaque BB marker was placed at the area of palpable sternal  lesion. No definite mammographic finding is visualized.      IMPRESSION:  Left breast focal asymmetry and questionable distortion. Diagnostic  mammogram recommended. Ultrasound may be indicated.      Targeted sonographic evaluation is also recommended for the patient's  palpable sternal lesion.      No mammographic evidence of malignancy in the right breast.      BI-RADS CATEGORY:  BI-RADS Category:  0 Incomplete; Need Additional Imaging Evaluation  Recommendation:  Additional  Imaging.  Recommended Date:  Immediate.  Laterality:  Left.         Left dx mammogram ordered +/- US if needed. Will follow up thereafter.

## 2025-01-28 NOTE — TELEPHONE ENCOUNTER
Patient of DR. Talon Aguilera submitted for mounjaro 5mg  It is approved trhough [facility]  until 12/31/2025  Case id pa [id number]

## 2025-02-11 ENCOUNTER — HOSPITAL ENCOUNTER (OUTPATIENT)
Dept: RADIOLOGY | Facility: HOSPITAL | Age: 70
Discharge: HOME | End: 2025-02-11
Payer: MEDICARE

## 2025-02-11 VITALS — BODY MASS INDEX: 25.28 KG/M2 | WEIGHT: 137.4 LBS | HEIGHT: 62 IN

## 2025-02-11 DIAGNOSIS — R92.8 ABNORMAL SCREENING MAMMOGRAM: ICD-10-CM

## 2025-02-11 PROCEDURE — 77065 DX MAMMO INCL CAD UNI: CPT | Mod: LT

## 2025-02-11 PROCEDURE — 76642 ULTRASOUND BREAST LIMITED: CPT | Mod: LEFT SIDE | Performed by: RADIOLOGY

## 2025-02-11 PROCEDURE — 77065 DX MAMMO INCL CAD UNI: CPT | Mod: LEFT SIDE | Performed by: RADIOLOGY

## 2025-02-11 PROCEDURE — 76642 ULTRASOUND BREAST LIMITED: CPT | Mod: LT

## 2025-02-11 PROCEDURE — G0279 TOMOSYNTHESIS, MAMMO: HCPCS | Mod: LEFT SIDE | Performed by: RADIOLOGY

## 2025-02-19 ENCOUNTER — PATIENT MESSAGE (OUTPATIENT)
Dept: PRIMARY CARE | Facility: CLINIC | Age: 70
End: 2025-02-19
Payer: MEDICARE

## 2025-02-19 DIAGNOSIS — Q06.9 CONGENITAL ANOMALY OF SPINAL CORD (MULTI): ICD-10-CM

## 2025-02-20 RX ORDER — GABAPENTIN 600 MG/1
600 TABLET ORAL 2 TIMES DAILY
Qty: 180 TABLET | Refills: 1 | Status: SHIPPED | OUTPATIENT
Start: 2025-02-20

## 2025-02-21 DIAGNOSIS — E78.5 DM TYPE 2 WITH DIABETIC DYSLIPIDEMIA (MULTI): ICD-10-CM

## 2025-02-21 DIAGNOSIS — E11.69 DM TYPE 2 WITH DIABETIC DYSLIPIDEMIA (MULTI): ICD-10-CM

## 2025-02-21 RX ORDER — TIRZEPATIDE 5 MG/.5ML
5 INJECTION, SOLUTION SUBCUTANEOUS WEEKLY
Qty: 2 ML | Refills: 1 | Status: SHIPPED | OUTPATIENT
Start: 2025-02-21

## 2025-02-21 NOTE — TELEPHONE ENCOUNTER
Recent Visits  Date Type Provider Dept   09/11/24 Office Visit Cm Hanley DO [facility]   Showing recent visits within past 180 days and meeting all other requirements  Future Appointments  Date Type Provider Dept   03/11/25 Appointment Cm Hanley DO [facility]   Showing future appointments within next 90 days and meeting all other requirements

## 2025-03-11 ENCOUNTER — APPOINTMENT (OUTPATIENT)
Dept: PRIMARY CARE | Facility: CLINIC | Age: 70
End: 2025-03-11
Payer: MEDICARE

## 2025-03-11 VITALS
HEIGHT: 62 IN | HEART RATE: 74 BPM | BODY MASS INDEX: 24.66 KG/M2 | DIASTOLIC BLOOD PRESSURE: 58 MMHG | OXYGEN SATURATION: 96 % | SYSTOLIC BLOOD PRESSURE: 118 MMHG | TEMPERATURE: 96.6 F | WEIGHT: 134 LBS | RESPIRATION RATE: 19 BRPM

## 2025-03-11 DIAGNOSIS — E03.9 ACQUIRED HYPOTHYROIDISM: ICD-10-CM

## 2025-03-11 DIAGNOSIS — L23.9 ALLERGIC DERMATITIS: ICD-10-CM

## 2025-03-11 DIAGNOSIS — E78.2 MIXED HYPERLIPIDEMIA: ICD-10-CM

## 2025-03-11 DIAGNOSIS — Z89.512 STATUS POST BELOW-KNEE AMPUTATION OF LEFT LOWER EXTREMITY (MULTI): ICD-10-CM

## 2025-03-11 DIAGNOSIS — E78.5 DM TYPE 2 WITH DIABETIC DYSLIPIDEMIA (MULTI): Primary | ICD-10-CM

## 2025-03-11 DIAGNOSIS — N18.32 CHRONIC KIDNEY DISEASE, STAGE 3B (MULTI): ICD-10-CM

## 2025-03-11 DIAGNOSIS — C50.919 INVASIVE LOBULAR CARCINOMA OF BREAST IN FEMALE: ICD-10-CM

## 2025-03-11 DIAGNOSIS — I10 BENIGN ESSENTIAL HYPERTENSION: ICD-10-CM

## 2025-03-11 DIAGNOSIS — Q06.9 CONGENITAL ANOMALY OF SPINAL CORD (MULTI): ICD-10-CM

## 2025-03-11 DIAGNOSIS — E11.69 DM TYPE 2 WITH DIABETIC DYSLIPIDEMIA (MULTI): Primary | ICD-10-CM

## 2025-03-11 DIAGNOSIS — Z79.899 MEDICATION MANAGEMENT: ICD-10-CM

## 2025-03-11 LAB
POC FINGERSTICK BLOOD GLUCOSE: 98 MG/DL (ref 70–100)
POC HEMOGLOBIN A1C: 4.9 % (ref 4.2–6.5)

## 2025-03-11 PROCEDURE — 82962 GLUCOSE BLOOD TEST: CPT | Performed by: FAMILY MEDICINE

## 2025-03-11 PROCEDURE — 99213 OFFICE O/P EST LOW 20 MIN: CPT | Performed by: FAMILY MEDICINE

## 2025-03-11 PROCEDURE — G2211 COMPLEX E/M VISIT ADD ON: HCPCS | Performed by: FAMILY MEDICINE

## 2025-03-11 PROCEDURE — 83036 HEMOGLOBIN GLYCOSYLATED A1C: CPT | Performed by: FAMILY MEDICINE

## 2025-03-11 RX ORDER — CLOBETASOL PROPIONATE 0.5 MG/G
CREAM TOPICAL 2 TIMES DAILY
Qty: 60 G | Refills: 1 | Status: SHIPPED | OUTPATIENT
Start: 2025-03-11

## 2025-03-11 ASSESSMENT — ENCOUNTER SYMPTOMS
DYSURIA: 0
POLYDIPSIA: 0
SHORTNESS OF BREATH: 0
DECREASED CONCENTRATION: 0
MYALGIAS: 0
HEMATURIA: 0
SLEEP DISTURBANCE: 0
POLYPHAGIA: 0
CONSTIPATION: 0
STRIDOR: 0
DIARRHEA: 0
FLANK PAIN: 0
DIZZINESS: 0
RECTAL PAIN: 0
AGITATION: 0
COUGH: 0
ABDOMINAL PAIN: 0
ADENOPATHY: 0
COLOR CHANGE: 0
BLOOD IN STOOL: 0
LOSS OF SENSATION IN FEET: 0
SINUS PAIN: 0
ABDOMINAL DISTENTION: 0
NECK STIFFNESS: 0
FATIGUE: 1
OCCASIONAL FEELINGS OF UNSTEADINESS: 0
SORE THROAT: 0
FEVER: 0
APPETITE CHANGE: 0
TROUBLE SWALLOWING: 0
ARTHRALGIAS: 0
DEPRESSION: 0
PALPITATIONS: 0
PHOTOPHOBIA: 0
SINUS PRESSURE: 0
EYE PAIN: 0
CONFUSION: 0
SPEECH DIFFICULTY: 0
CHEST TIGHTNESS: 0
NERVOUS/ANXIOUS: 0
ACTIVITY CHANGE: 0
HEADACHES: 1
SEIZURES: 0
RHINORRHEA: 0

## 2025-03-11 NOTE — PATIENT INSTRUCTIONS
Follow up in 5 months     Continue current medications and therapy for chronic medical conditions.    Patient was advised importance of proper diet/nutrition in addition adequate hydration. Patient was encouraged moderate exercise program to include 30 minutes daily for 5 days of the week or 150 minutes weekly. Patient will follow-up with us as scheduled.    I personally reviewed the [facility] report for this patient. I have considered the risks of abuse, dependence, addiction, and diversion.    UDS/CSA: DUE     His Accu-Chek and hemoglobin A1c    Start Temovate 0.05% cream twice daily

## 2025-03-11 NOTE — PROGRESS NOTES
Subjective   Patient ID: Benoit Chowdhury is a 70 y.o. female who presents for Diabetes and Rash.    Patient denies any other symptoms or concerns today.    Diabetes  She presents for her follow-up diabetic visit. She has type 2 diabetes mellitus. Hypoglycemia symptoms include headaches. Pertinent negatives for hypoglycemia include no confusion, dizziness, nervousness/anxiousness, seizures or speech difficulty. Associated symptoms include fatigue. Pertinent negatives for diabetes include no chest pain, no polydipsia, no polyphagia and no polyuria.   Rash  This is a recurrent problem. The current episode started more than 1 month ago. The problem is unchanged. The affected locations include the left axilla, torso and chest. The rash is characterized by itchiness, dryness and redness. She was exposed to nothing. Associated symptoms include fatigue. Pertinent negatives include no congestion, cough, diarrhea, eye pain, fever, rhinorrhea, shortness of breath or sore throat. (Nights sweats) Past treatments include anti-itch cream, antihistamine, moisturizer, analgesics and oral steroids. The treatment provided no relief.        Review of Systems   Constitutional:  Positive for fatigue. Negative for activity change, appetite change and fever.   HENT:  Negative for congestion, dental problem, ear discharge, ear pain, mouth sores, rhinorrhea, sinus pressure, sinus pain, sore throat, tinnitus and trouble swallowing.    Eyes:  Negative for photophobia, pain and visual disturbance.   Respiratory:  Negative for cough, chest tightness, shortness of breath and stridor.    Cardiovascular:  Negative for chest pain and palpitations.   Gastrointestinal:  Negative for abdominal distention, abdominal pain, blood in stool, constipation, diarrhea and rectal pain.   Endocrine: Negative for cold intolerance, heat intolerance, polydipsia, polyphagia and polyuria.   Genitourinary:  Negative for dysuria, flank pain, hematuria and urgency.  "  Musculoskeletal:  Negative for arthralgias, gait problem, myalgias and neck stiffness.   Skin:  Positive for rash. Negative for color change.   Allergic/Immunologic: Negative for environmental allergies and food allergies.   Neurological:  Positive for headaches. Negative for dizziness, seizures, syncope and speech difficulty.   Hematological:  Negative for adenopathy.   Psychiatric/Behavioral:  Positive for dysphoric mood. Negative for agitation, confusion, decreased concentration and sleep disturbance. The patient is not nervous/anxious.        Objective   /58 (BP Location: Left arm, Patient Position: Sitting, BP Cuff Size: Adult)   Pulse 74   Temp 35.9 °C (96.6 °F) (Skin)   Resp 19   Ht 1.575 m (5' 2\")   Wt 60.8 kg (134 lb)   LMP  (LMP Unknown)   SpO2 96%   BMI 24.51 kg/m²     Physical Exam  Vitals reviewed.   Constitutional:       General: She is not in acute distress.     Appearance: Normal appearance. She is normal weight. She is not ill-appearing or diaphoretic.   HENT:      Head: Normocephalic.      Right Ear: Tympanic membrane and external ear normal.      Left Ear: Tympanic membrane and external ear normal.      Nose: Nose normal. No congestion.      Mouth/Throat:      Pharynx: No posterior oropharyngeal erythema.   Eyes:      General:         Right eye: No discharge.         Left eye: No discharge.      Extraocular Movements: Extraocular movements intact.      Conjunctiva/sclera: Conjunctivae normal.      Pupils: Pupils are equal, round, and reactive to light.   Cardiovascular:      Rate and Rhythm: Normal rate and regular rhythm.      Pulses: Normal pulses.      Heart sounds: Normal heart sounds. No murmur heard.  Pulmonary:      Effort: Pulmonary effort is normal. No respiratory distress.      Breath sounds: Normal breath sounds. No wheezing or rales.   Chest:      Chest wall: No tenderness.   Abdominal:      General: Abdomen is flat. Bowel sounds are normal. There is no distension.      " Palpations: There is no mass.      Tenderness: There is no abdominal tenderness. There is no guarding.   Musculoskeletal:         General: No tenderness. Normal range of motion.      Cervical back: Normal range of motion and neck supple. No tenderness.      Right lower leg: No edema.      Left lower leg: No edema.   Skin:     General: Skin is dry.      Coloration: Skin is not jaundiced.      Findings: No bruising, erythema or rash.   Neurological:      General: No focal deficit present.      Mental Status: She is alert and oriented to person, place, and time. Mental status is at baseline.      Cranial Nerves: No cranial nerve deficit.      Sensory: No sensory deficit.      Coordination: Coordination abnormal.      Gait: Gait abnormal.   Psychiatric:         Thought Content: Thought content normal.         Judgment: Judgment normal.         Assessment/Plan   Problem List Items Addressed This Visit             ICD-10-CM    Benign essential hypertension I10    DM type 2 with diabetic dyslipidemia (Multi) - Primary E11.69, E78.5    Relevant Orders    POCT glycosylated hemoglobin (Hb A1C) manually resulted (Completed)    POCT fingerstick glucose manually resulted (Completed)    Hyperlipidemia E78.5    Hypothyroidism E03.9    Status post below-knee amputation of left lower extremity (Multi) Z89.512     Stable/monitored         Invasive lobular carcinoma of breast in female C50.919     Monitored/stable         Congenital anomaly of spinal cord (Multi) 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   Provider Attestation - Scribe documentation    All medical record entries made by the Scribe were at my direction and personally dictated by me. I have reviewed the chart and agree that the record accurately reflects my personal performance of the history, physical exam, discussion and plan.

## 2025-03-16 PROBLEM — N18.32 CHRONIC KIDNEY DISEASE, STAGE 3B (MULTI): Status: ACTIVE | Noted: 2025-03-16

## 2025-03-16 ASSESSMENT — ENCOUNTER SYMPTOMS: DYSPHORIC MOOD: 1

## 2025-04-10 DIAGNOSIS — M15.9 GENERALIZED OSTEOARTHROSIS: ICD-10-CM

## 2025-04-10 DIAGNOSIS — E55.9 VITAMIN D DEFICIENCY: ICD-10-CM

## 2025-04-10 DIAGNOSIS — E78.2 MIXED HYPERLIPIDEMIA: ICD-10-CM

## 2025-04-10 DIAGNOSIS — E03.9 ACQUIRED HYPOTHYROIDISM: ICD-10-CM

## 2025-04-13 DIAGNOSIS — I10 BENIGN ESSENTIAL HYPERTENSION: ICD-10-CM

## 2025-04-13 DIAGNOSIS — E78.2 MIXED HYPERLIPIDEMIA: ICD-10-CM

## 2025-04-14 LAB
ALBUMIN SERPL-MCNC: 4 G/DL (ref 3.6–5.1)
ALP SERPL-CCNC: 64 U/L (ref 37–153)
ALT SERPL-CCNC: 11 U/L (ref 6–29)
ANA PAT SER IF-IMP: ABNORMAL
ANA SER QL IF: POSITIVE
ANA TITR SER IF: ABNORMAL TITER
ANION GAP SERPL CALCULATED.4IONS-SCNC: 9 MMOL/L (CALC) (ref 7–17)
AST SERPL-CCNC: 18 U/L (ref 10–35)
BASOPHILS # BLD AUTO: 47 CELLS/UL (ref 0–200)
BASOPHILS NFR BLD AUTO: 0.7 %
BILIRUB SERPL-MCNC: 0.4 MG/DL (ref 0.2–1.2)
BUN SERPL-MCNC: 21 MG/DL (ref 7–25)
C3 SERPL-MCNC: 169 MG/DL (ref 83–193)
C4 SERPL-MCNC: 38 MG/DL (ref 15–57)
CALCIUM SERPL-MCNC: 9.6 MG/DL (ref 8.6–10.4)
CHLORIDE SERPL-SCNC: 103 MMOL/L (ref 98–110)
CHOLEST SERPL-MCNC: 197 MG/DL
CHOLEST/HDLC SERPL: 4 (CALC)
CO2 SERPL-SCNC: 29 MMOL/L (ref 20–32)
CREAT SERPL-MCNC: 1.27 MG/DL (ref 0.5–1.05)
CRP SERPL-MCNC: 9.8 MG/L
EGFRCR SERPLBLD CKD-EPI 2021: 46 ML/MIN/1.73M2
EOSINOPHIL # BLD AUTO: 503 CELLS/UL (ref 15–500)
EOSINOPHIL NFR BLD AUTO: 7.5 %
ERYTHROCYTE [DISTWIDTH] IN BLOOD BY AUTOMATED COUNT: 12.5 % (ref 11–15)
ERYTHROCYTE [SEDIMENTATION RATE] IN BLOOD BY WESTERGREN METHOD: 29 MM/H
GLUCOSE SERPL-MCNC: 86 MG/DL (ref 65–99)
HCT VFR BLD AUTO: 38 % (ref 35–45)
HDLC SERPL-MCNC: 49 MG/DL
HGB BLD-MCNC: 12.8 G/DL (ref 11.7–15.5)
LDLC SERPL CALC-MCNC: 106 MG/DL (CALC)
LYMPHOCYTES # BLD AUTO: 1173 CELLS/UL (ref 850–3900)
LYMPHOCYTES NFR BLD AUTO: 17.5 %
MCH RBC QN AUTO: 30.2 PG (ref 27–33)
MCHC RBC AUTO-ENTMCNC: 33.7 G/DL (ref 32–36)
MCV RBC AUTO: 89.6 FL (ref 80–100)
MONOCYTES # BLD AUTO: 623 CELLS/UL (ref 200–950)
MONOCYTES NFR BLD AUTO: 9.3 %
NEUTROPHILS # BLD AUTO: 4355 CELLS/UL (ref 1500–7800)
NEUTROPHILS NFR BLD AUTO: 65 %
NONHDLC SERPL-MCNC: 148 MG/DL (CALC)
PLATELET # BLD AUTO: 222 THOUSAND/UL (ref 140–400)
PMV BLD REES-ECKER: 11.3 FL (ref 7.5–12.5)
POTASSIUM SERPL-SCNC: 4 MMOL/L (ref 3.5–5.3)
PROT SERPL-MCNC: 6.6 G/DL (ref 6.1–8.1)
RBC # BLD AUTO: 4.24 MILLION/UL (ref 3.8–5.1)
SODIUM SERPL-SCNC: 141 MMOL/L (ref 135–146)
TRIGL SERPL-MCNC: 317 MG/DL
TSH SERPL-ACNC: 1.41 MIU/L (ref 0.4–4.5)
WBC # BLD AUTO: 6.7 THOUSAND/UL (ref 3.8–10.8)

## 2025-04-14 RX ORDER — SIMVASTATIN 10 MG/1
10 TABLET, FILM COATED ORAL NIGHTLY
Qty: 90 TABLET | Refills: 1 | Status: SHIPPED | OUTPATIENT
Start: 2025-04-14

## 2025-04-14 RX ORDER — METOPROLOL SUCCINATE 25 MG/1
25 TABLET, EXTENDED RELEASE ORAL DAILY
Qty: 90 TABLET | Refills: 1 | Status: SHIPPED | OUTPATIENT
Start: 2025-04-14

## 2025-04-14 NOTE — TELEPHONE ENCOUNTER
Recent Visits  Date Type Provider Dept   03/11/25 [facility]   Showing recent visits within past 180 days and meeting all other requirements  Future Appointments  No visits were found meeting these conditions.  Showing future appointments within next 90 days and meeting all other requirements

## 2025-04-18 ENCOUNTER — OFFICE VISIT (OUTPATIENT)
Dept: PRIMARY CARE | Facility: CLINIC | Age: 70
End: 2025-04-18
Payer: MEDICARE

## 2025-04-18 VITALS
SYSTOLIC BLOOD PRESSURE: 102 MMHG | HEART RATE: 69 BPM | DIASTOLIC BLOOD PRESSURE: 68 MMHG | BODY MASS INDEX: 24.33 KG/M2 | WEIGHT: 133 LBS | OXYGEN SATURATION: 96 %

## 2025-04-18 DIAGNOSIS — Q06.9 CONGENITAL ANOMALY OF SPINAL CORD (MULTI): ICD-10-CM

## 2025-04-18 DIAGNOSIS — N18.32 CHRONIC KIDNEY DISEASE, STAGE 3B (MULTI): ICD-10-CM

## 2025-04-18 DIAGNOSIS — E78.5 DM TYPE 2 WITH DIABETIC DYSLIPIDEMIA (MULTI): ICD-10-CM

## 2025-04-18 DIAGNOSIS — M47.26 OSTEOARTHRITIS OF SPINE WITH RADICULOPATHY, LUMBAR REGION: ICD-10-CM

## 2025-04-18 DIAGNOSIS — Z79.899 MEDICATION MANAGEMENT: ICD-10-CM

## 2025-04-18 DIAGNOSIS — E11.69 DM TYPE 2 WITH DIABETIC DYSLIPIDEMIA (MULTI): ICD-10-CM

## 2025-04-18 DIAGNOSIS — J30.1 SEASONAL ALLERGIC RHINITIS DUE TO POLLEN: ICD-10-CM

## 2025-04-18 DIAGNOSIS — M25.50 ARTHRALGIA, UNSPECIFIED JOINT: Primary | ICD-10-CM

## 2025-04-18 DIAGNOSIS — N18.31 STAGE 3A CHRONIC KIDNEY DISEASE (MULTI): ICD-10-CM

## 2025-04-18 PROCEDURE — 99214 OFFICE O/P EST MOD 30 MIN: CPT | Performed by: FAMILY MEDICINE

## 2025-04-18 RX ORDER — LISINOPRIL 2.5 MG/1
2.5 TABLET ORAL DAILY
Qty: 90 TABLET | Refills: 1 | Status: SHIPPED | OUTPATIENT
Start: 2025-04-18 | End: 2026-05-23

## 2025-04-18 RX ORDER — TIRZEPATIDE 5 MG/.5ML
5 INJECTION, SOLUTION SUBCUTANEOUS WEEKLY
Qty: 2 ML | Refills: 1 | OUTPATIENT
Start: 2025-04-18

## 2025-04-18 RX ORDER — GABAPENTIN 600 MG/1
1200 TABLET ORAL 2 TIMES DAILY
Qty: 120 TABLET | Refills: 0 | Status: SHIPPED | OUTPATIENT
Start: 2025-04-18

## 2025-04-18 RX ORDER — ACETAMINOPHEN 500 MG
1000 TABLET ORAL EVERY 6 HOURS PRN
Start: 2025-04-18 | End: 2025-04-28

## 2025-04-18 RX ORDER — TIRZEPATIDE 5 MG/.5ML
5 INJECTION, SOLUTION SUBCUTANEOUS WEEKLY
Qty: 2 ML | Refills: 1 | Status: SHIPPED | OUTPATIENT
Start: 2025-04-18

## 2025-04-18 NOTE — TELEPHONE ENCOUNTER
Recent Visits  Date Type Provider Dept   03/11/25 Office Visit Cm Hanley, DO [facility]   09/11/24 Office Visit Cm Hanley, DO [facility]   06/14/24 Office Visit Cm Hanley, DO [facility]   Showing recent visits within past 365 days and meeting all other requirements  Today's Visits  Date Type Provider Dept   04/18/25 Appointment Ross Scales MD [facility]   Showing today's visits and meeting all other requirements  Future Appointments  No visits were found meeting these conditions.  Showing future appointments within next 90 days and meeting all other requirements

## 2025-04-18 NOTE — PROGRESS NOTES
"Subjective   Patient ID: Alejandra Chowdhury \"Jan\" is a 70 y.o. female who presents for Results (Pt in to follow up with PCP and review lab work. ).  History of Present Illness  Alejandra Chowdhury \"Jan\" is a 70-year-old female with type 2 diabetes who presents for evaluation of a rash and arm pain, and to review blood work for lupus.    She is experiencing a rash and pain in both arms, with the rash being the most bothersome symptom. The rash has not improved with various creams. The arm pain is worsening and involves the muscles.    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    There is a family history of lupus, as her mother had the condition. Recent blood work was conducted to evaluate for lupus, including tests such as TSH, sed rate, C3, C4, C-reactive protein, and TELMA. The C-reactive protein was slightly elevated at 9.8, and the TELMA was 1:160, which is slightly elevated. Other tests, including cholesterol, LDL, glucose, and kidney function, were reviewed. Her kidney function was noted to be slightly decreased, but she denies taking medications like Motrin that could affect it.    She is currently taking gabapentin 1200 mg daily for nerve pain behind her knee, which helps with her knee pain. She also takes Mounjaro for type 2 diabetes, which has been effective in controlling her blood sugar levels, with her A1c decreasing from 7 to 4. She previously tried Jardiance and Farxiga but experienced urinary pain and symptoms similar to a bladder infection, leading to discontinuation of these medications.    In terms of her past medical history, she has type 2 diabetes and a history of hypertension, for which she was previously on losartan, but it was discontinued due to low blood pressure. She recalls being on lisinopril in the past but does not remember any adverse effects. She is also on a low dose of generic " Zoloft (50 mg) for depression.    No current use of anti-inflammatory medications due to concerns about kidney function. She is open to using Tylenol for pain management.    Review of Systems  12 Systems have been reviewed as follows.  Constitutional: Fever, weight gain, weight loss, appetite change, night sweats, fatigue, chills.  Eyes : blurry, double vision, vision, loss, tearing, redness, pain, sensitivity to light, glaucoma.  Ears, nose, mouth, and throat: Hearing loss, ringing in the ears, ear pain, nasal congestion, nasal drainage, nosebleeds, mouth, throat, irritation tooth problem.  Cardiovascular :chest pain, pressure, heart racing, palpitations, sweating, leg swelling, high or low blood pressure  Pulmonary: Cough, yellow or green sputum, blood and sputum, shortness of breath, wheezing  Gastrointestinal: Nausea, vomiting, diarrhea, constipation, pain, blood in stool, or vomitus, heartburn, difficulty swallowing  Genitourinary: incontinence, abnormal bleeding, abnormal discharge, urinary frequency, urinary hesitancy, pain, impotence sexual problem, infection, urinary retention  Musculoskeletal: Pain, stiffness, joint, redness or warmth, arthritis, back pain, weakness, muscle wasting, sprain or fracture  Neuro: Weight weakness, dizziness, change in voice, change in taste change in vision, change in hearing, loss, or change of sensation, trouble walking, balance problems coordination problems, shaking, speech problem  Endocrine , cold or heat intolerance, blood sugar problem, weight gain or loss missed periods hot flashes, sweats, change in body hair, change in libido, increased thirst, increased urination  Heme/lymph: Swelling, bleeding, problem anemia, bruising, enlarged lymph nodes  Allergic/immunologic: H. plus nasal drip, watery itchy eyes, nasal drainage, immunosuppressed  The above were reviewed and noted negative except as noted in HPI and Problem List.    Objective     /68   Pulse 69   Wt  60.3 kg (133 lb)   LMP  (LMP Unknown)   SpO2 96%   BMI 24.33 kg/m²      Physical Exam    Constitutional: Well developed, well nourished, alert and in no acute distress   Eyes: Normal external exam. Pupils equally round and reactive to light with normal accommodation and extraocular movements intact.  Neck: Supple, no lymphadenopathy or masses.   Cardiovascular: Regular rate and rhythm, normal S1 and S2, no murmurs, gallops, or rubs. Radial pulses normal. No peripheral edema.  Pulmonary: No respiratory distress, lungs clear to auscultation bilaterally. No wheezes, rhonchi, rales.  Abdomen: soft,non tender, non distended, without masses or HSM  Skin: Warm, well perfused, normal skin turgor and color.   Neurologic: Cranial nerves II-XII grossly intact.   Psychiatric: Mood calm and affect normal  Musculoskeletal: Moving all extremities without restriction  The above were reviewed and noted negative except as noted in HPI and Problem List.      Results  LABS  TSH: normal (03/2025)  Erythrocyte Sedimentation Rate (ESR): normal (03/2025)  C3: normal (03/2025)  C4: normal (03/2025)  C-Reactive Protein (CRP): 9.8 (03/2025)  Antinuclear Antibody (TELMA): 1:160 (03/2025)  Total Cholesterol: 197 (03/2025)  Low-Density Lipoprotein (LDL): 106 (03/2025)  Glucose: 86 (03/2025)  Renal Function: decreased (03/2025)  Red Blood Cell Count (RBC): normal (03/2025)  White Blood Cell Count (WBC): normal (03/2025)  Hemoglobin A1c (HbA1c): 4% (03/2025)         Assessment & Plan  Suspected Lupus  Presents with a rash and pain in both arms, raising suspicion for lupus, especially given her family history of lupus. Blood tests show a borderline TELMA titer of 1:160 and elevated C-reactive protein, suggestive but not definitive for lupus.  - Order rheumatoid factor test  - Refer to rheumatologist for further evaluation    Chronic Pain  Experiences chronic pain in her arms and behind her knee, currently managed with gabapentin 1200 mg daily. Pain  in her arms is worsening, and she has arthritis. Discussed potential use of Cymbalta, which is approved for pain and has antidepressant properties, but will reserve for later if needed.  - Continue gabapentin 1200 mg daily  - Recommend Tylenol 500 mg, two tablets, up to four times a day as needed for pain  - Consider Cymbalta for pain management if Tylenol is ineffective    Chronic Kidney Disease  Kidney function is slightly decreased, possibly related to diabetes. Cannot tolerate SGLT2 inhibitors like Farxiga and Jardiance due to urinary side effects. Discussed using an ACE inhibitor, lisinopril, to protect kidney function and manage blood pressure. Explained that lisinopril may lower blood pressure slightly but is unlikely to be significant at a low dose.  - Prescribe lisinopril 2.5 mg daily to protect kidney function and manage blood pressure    Type 2 Diabetes Mellitus  Type 2 diabetes, previously managed with Jardiance, which caused urinary side effects. Currently on Mounjaro, effectively controlling blood glucose levels, with a recent A1c of 4%.  - Continue Mounjaro for diabetes management    Problem List Items Addressed This Visit       DM type 2 with diabetic dyslipidemia (Multi)    Relevant Medications    tirzepatide (Mounjaro) 5 mg/0.5 mL pen injector    lisinopril 2.5 mg tablet    Other Relevant Orders    Follow Up In [facility] - PCP - Established    Allergic rhinitis due to pollen    Relevant Orders    Follow Up [facility] - PCP - Established    Stage 3a chronic kidney disease (Multi)    Relevant Orders    Follow Up [facility] - PCP - Established    Congenital anomaly of spinal cord (Multi)    Relevant Medications    gabapentin (Neurontin) 600 mg tablet    Other Relevant Orders    Follow Up [facility] - PCP - Established    Chronic kidney disease, stage 3b (Multi)    Relevant Orders    Follow Up [facility] - PCP - Established     Other  Visit Diagnoses         Arthralgia, unspecified joint    -  Primary    Relevant Medications    acetaminophen (Tylenol Extra Strength) 500 mg tablet    Other Relevant Orders    Rheumatoid Factor    Citrulline Antibody, IgG    Follow Up [facility] - PCP - Established      Medication management        Relevant Orders    Opiate/Opioid/Benzo Prescription Compliance    Follow Up In [facility] - PCP - Established      Osteoarthritis of spine with radiculopathy, lumbar region        Relevant Orders    Follow Up [facility] - PCP - Established             Ross Scales MD       This medical note was created with the assistance of artificial intelligence (AI) for documentation purposes. The content has been reviewed and confirmed by the healthcare provider for accuracy and completeness. Patient consented to the use of audio recording and use of AI during their visit.

## 2025-04-20 LAB
1OH-MIDAZOLAM UR-MCNC: NEGATIVE NG/ML
7AMINOCLONAZEPAM UR-MCNC: NEGATIVE NG/ML
A-OH ALPRAZ UR-MCNC: NEGATIVE NG/ML
A-OH-TRIAZOLAM UR-MCNC: NEGATIVE NG/ML
AMPHETAMINES UR QL: NEGATIVE NG/ML
BARBITURATES UR QL: NEGATIVE NG/ML
BZE UR QL: NEGATIVE NG/ML
CODEINE UR-MCNC: NEGATIVE NG/ML
CREAT UR-MCNC: 52.9 MG/DL
DRUG SCREEN COMMENT UR-IMP: NORMAL
EDDP UR-MCNC: NORMAL NG/ML
FENTANYL UR-MCNC: NEGATIVE NG/ML
HYDROCODONE UR-MCNC: NEGATIVE NG/ML
HYDROMORPHONE UR-MCNC: NEGATIVE NG/ML
LORAZEPAM UR-MCNC: NEGATIVE NG/ML
METHADONE UR-MCNC: NORMAL UG/L
MORPHINE UR-MCNC: NEGATIVE NG/ML
NORDIAZEPAM UR-MCNC: NEGATIVE NG/ML
NORFENTANYL UR-MCNC: NEGATIVE NG/ML
NORHYDROCODONE UR CFM-MCNC: NEGATIVE NG/ML
NOROXYCODONE UR CFM-MCNC: NEGATIVE NG/ML
NORTRAMADOL UR-MCNC: NORMAL UG/ML
OH-ETHYLFLURAZ UR-MCNC: NEGATIVE NG/ML
OXAZEPAM UR-MCNC: NEGATIVE NG/ML
OXIDANTS UR QL: NEGATIVE MCG/ML
OXYCODONE UR CFM-MCNC: NEGATIVE NG/ML
OXYMORPHONE UR CFM-MCNC: NEGATIVE NG/ML
PCP UR QL: NEGATIVE NG/ML
PH UR: 7.4 [PH] (ref 4.5–9)
QUEST 6 ACETYLMORPHINE: NORMAL
QUEST NOTES AND COMMENTS: NORMAL
QUEST PATIENT HISTORICAL REPORT: NORMAL
QUEST ZOLPIDEM: NEGATIVE NG/ML
TEMAZEPAM UR-MCNC: NEGATIVE NG/ML
THC UR QL: NEGATIVE NG/ML
TRAMADOL UR-MCNC: NORMAL UG/ML
ZOLPIDEM PHENYL-4-CARB UR CFM-MCNC: NEGATIVE NG/ML

## 2025-04-22 LAB
1OH-MIDAZOLAM UR-MCNC: NEGATIVE NG/ML
7AMINOCLONAZEPAM UR-MCNC: NEGATIVE NG/ML
A-OH ALPRAZ UR-MCNC: NEGATIVE NG/ML
A-OH-TRIAZOLAM UR-MCNC: NEGATIVE NG/ML
AMPHETAMINES UR QL: NEGATIVE NG/ML
BARBITURATES UR QL: NEGATIVE NG/ML
BZE UR QL: NEGATIVE NG/ML
CODEINE UR-MCNC: NEGATIVE NG/ML
CREAT UR-MCNC: 52.9 MG/DL
DRUG SCREEN COMMENT UR-IMP: NORMAL
EDDP UR-MCNC: NEGATIVE NG/ML
FENTANYL UR-MCNC: NEGATIVE NG/ML
HYDROCODONE UR-MCNC: NEGATIVE NG/ML
HYDROMORPHONE UR-MCNC: NEGATIVE NG/ML
LORAZEPAM UR-MCNC: NEGATIVE NG/ML
METHADONE UR-MCNC: NEGATIVE NG/ML
MORPHINE UR-MCNC: NEGATIVE NG/ML
NORDIAZEPAM UR-MCNC: NEGATIVE NG/ML
NORFENTANYL UR-MCNC: NEGATIVE NG/ML
NORHYDROCODONE UR CFM-MCNC: NEGATIVE NG/ML
NOROXYCODONE UR CFM-MCNC: NEGATIVE NG/ML
NORTRAMADOL UR-MCNC: NEGATIVE NG/ML
OH-ETHYLFLURAZ UR-MCNC: NEGATIVE NG/ML
OXAZEPAM UR-MCNC: NEGATIVE NG/ML
OXIDANTS UR QL: NEGATIVE MCG/ML
OXYCODONE UR CFM-MCNC: NEGATIVE NG/ML
OXYMORPHONE UR CFM-MCNC: NEGATIVE NG/ML
PCP UR QL: NEGATIVE NG/ML
PH UR: 7.4 [PH] (ref 4.5–9)
QUEST 6 ACETYLMORPHINE: NEGATIVE NG/ML
QUEST NOTES AND COMMENTS: NORMAL
QUEST ZOLPIDEM: NEGATIVE NG/ML
TEMAZEPAM UR-MCNC: NEGATIVE NG/ML
THC UR QL: NEGATIVE NG/ML
TRAMADOL UR-MCNC: NEGATIVE NG/ML
ZOLPIDEM PHENYL-4-CARB UR CFM-MCNC: NEGATIVE NG/ML

## 2025-04-26 ENCOUNTER — TELEPHONE (OUTPATIENT)
Dept: PRIMARY CARE | Facility: CLINIC | Age: 70
End: 2025-04-26
Payer: MEDICARE

## 2025-04-26 NOTE — TELEPHONE ENCOUNTER
Patient of Dr. Talon Aguilera submitted for mounjaro    It is approved through [facility] from 01/27/2025 until 12/31/2025  Case id [id number]

## 2025-04-30 LAB
CCP IGG SERPL-ACNC: <16 UNITS
RHEUMATOID FACT SERPL-ACNC: <10 IU/ML

## 2025-05-05 DIAGNOSIS — F32.9 MAJOR DEPRESSIVE DISORDER, REMISSION STATUS UNSPECIFIED, UNSPECIFIED WHETHER RECURRENT: ICD-10-CM

## 2025-05-05 RX ORDER — SERTRALINE HYDROCHLORIDE 50 MG/1
50 TABLET, FILM COATED ORAL DAILY
Qty: 90 TABLET | Refills: 1 | Status: SHIPPED | OUTPATIENT
Start: 2025-05-05

## 2025-05-05 NOTE — PROGRESS NOTES
Patient ID: Benoit Chowdhury is a 70 y.o. female.    The patient presents to clinic today for her history of breast cancer.     Cancer Staging   Invasive lobular carcinoma of breast in female  Staging form: Breast, AJCC 8th Edition  - Clinical stage from 3/22/2023: Stage IA (cT1c, cN0, cM0, G2, ER+, IL+, HER2-) - Unsigned        Diagnostic/Therapeutic History:  - On 1/18/2023 she had screening mammogram that showed a spiculated mass in the upper outer right breast, bilateral benign similar-appearing circumscribed and obscured  masses are noted.  No suspicious masses or calcification of the left breast.  BI-RADS Category 0     - right breast rash in January 2023     -On 2/2/2023 she had targeted ultrasound of the right breast that showedat 10:00, 9 cm from the nipple a 0.7 x 0.5 x 0.8 cm irregular hypoechoic mass which corresponds to the mass seen on mammogram, 7 morphologically normal lymph nodes identified the  right axilla      -On 2/8/2023 she had right breast ultrasound-guided core biopsy that showed invasive lobular carcinoma, grade 2 ER 95%, IL 95%, HER2 negative 1+.  Patient also had lobular carcinoma in situ, MammaPrint index +0.624, low risk luminal type a     -On 3/22/2023, she underwent right partial mastectomy with sentinel lymph node biopsy (0/1) that showed invasive lobular carcinoma 12 mm in greatest dimension, grade 2 with negative margins staged as a PT1CN0 ER 95%, IL 95%, HER2 negative 1+, did have  LCIS      - 5/1/23- 5/26/23: completed radiation     History of Present Illness (HPI)/Interval History:  Ms. Chowdhury presents for presents today for follow-up and surveillance.     She denies any new breast cancer concerns. She did have a bx in Jan 2024 due to new post-op changes on mammogram and US, found to be fat necrosis and hemosiderin laden macrophages.     She denies any chest pain or breathing issues.     She denies any vision changes, dizziness, loss of balance.  Hx of migraines, manageable headaches.  No recent falls.     She has been having increased bone pain, currently undergoing a work up for lupus. Baseline arthritic pain in joint.     She denies any skin lesions or masses.    She reports a normal appetite. Lost some weight, though intentionally due to diabetes management.     She has been really tired despite sleeping a full night.     She completed high dose vit d - not on any vit d at this time.     Review of Systems:  14-point ROS otherwise negative, as per HPI.    Past Medical History:   Diagnosis Date    Acute gastritis with bleeding 04/10/2021    Acute superficial gastritis with hemorrhage    Allergic contact dermatitis, unspecified cause 05/22/2022    Allergic dermatitis    Arthritis     Breast cancer january 2023    Breast cyst unnown    Cutaneous abscess of groin 10/04/2021    Abscess of groin    Cutaneous abscess of left axilla 10/20/2021    Abscess of axilla, left    Diabetes mellitus (Multi)     Disorder of kidney and ureter, unspecified 10/25/2021    Low kidney function    Effusion, right foot 03/18/2021    Swelling of first metatarsophalangeal (MTP) joint of right foot    Encounter for general adult medical examination without abnormal findings 02/08/2021    Encounter for Medicare annual wellness exam    Encounter for general adult medical examination without abnormal findings 05/18/2022    Encounter for Medicare annual wellness exam    Encounter for other screening for malignant neoplasm of breast     Breast cancer screening    Encounter for screening for malignant neoplasm of colon 09/23/2021    Encounter for colorectal cancer screening    Lobular carcinoma in situ january 2023    Nonspecific lymphadenitis, unspecified 10/20/2021    Axillary adenitis    Otitis media, unspecified, unspecified ear 01/09/2020    Ear infection    Pain in right toe(s) 03/18/2021    Pain of right great toe    Pain in unspecified joint 05/08/2020    Multiple joint pain    Personal history of diseases of the skin  and subcutaneous tissue 10/20/2021    History of folliculitis    Personal history of diseases of the skin and subcutaneous tissue 2020    History of acute dermatitis    Personal history of diseases of the skin and subcutaneous tissue 2020    History of skin pruritus    Personal history of irradiation     Personal history of other drug therapy 2021    History of influenza vaccination    Personal history of other drug therapy 2020    History of influenza vaccination    Personal history of other endocrine, nutritional and metabolic disease 2021    History of hyperglycemia    Personal history of other specified conditions 2022    History of diarrhea    Personal history of other specified conditions 2022    History of vomiting    Rash and other nonspecific skin eruption 2020    Rash in adult    Rash and other nonspecific skin eruption 2021    Rash    Rash and other nonspecific skin eruption 2022    Rash       Past Surgical History:   Procedure Laterality Date    BREAST BIOPSY      BREAST LUMPECTOMY       SECTION, LOW TRANSVERSE      CHOLECYSTECTOMY      HERNIA REPAIR      HYSTERECTOMY  1988    OTHER SURGICAL HISTORY  2020    Hysterectomy    OTHER SURGICAL HISTORY  2020     section    OTHER SURGICAL HISTORY  2020    Cholelithotomy    OTHER SURGICAL HISTORY  2020    Hernia repair    OTHER SURGICAL HISTORY  2020    Lower extremity amputation    OTHER SURGICAL HISTORY  2022    Ventral hernia repair    OTHER SURGICAL HISTORY  2022    Colonoscopy       Social History     Socioeconomic History    Marital status:    Tobacco Use    Smoking status: Never     Passive exposure: Never    Smokeless tobacco: Never   Substance and Sexual Activity    Alcohol use: Never    Drug use: Never     Social Drivers of Health     Financial Resource Strain: Low Risk  (2024)    Received from [employer]     Overall Financial Resource Strain (CARDIA)     Difficulty of Paying Living Expenses: Not very hard   Food Insecurity: No Food Insecurity (2/1/2024)    Received from [employer]    Hunger Vital Sign     Worried About Running Out of Food in the Last Year: Never true     Ran Out of Food in the Last Year: Never true   Transportation Needs: No Transportation Needs (2/1/2024)    Received from [employer]    PRAPARE - Transportation     Lack of Transportation (Medical): No     Lack of Transportation (Non-Medical): No   Physical Activity: Insufficiently Active (2/1/2024)    Received from [employer]    Exercise Vital Sign     Days of Exercise per Week: 1 day     Minutes of Exercise per Session: 10 min   Stress: Stress Concern Present (2/1/2024)    Received from [employer]    [employer] - Occupational Stress Questionnaire     Feeling of Stress : Very much   Social Connections: Moderately Integrated (2/1/2024)    Received from [employer]    Social Connection and Isolation Panel [NHANES]     Frequency of Communication with Friends and Family: Three times a week     Frequency of Social Gatherings with Friends and Family: Once a week     Attends Cheondo[employer] Services: 1 to 4 times per year     Active Member of Clubs or Organizations: No     Attends Club or Organization Meetings: Never     Marital Status:    Intimate Partner Violence: Not At Risk (2/1/2024)    Received from [employer]    Humiliation, Afraid, Rape, and Kick questionnaire     Fear of Current or Ex-Partner: No     Emotionally Abused: No     Physically Abused: No     Sexually Abused: No   Housing Stability: Low Risk  (2/1/2024)    Received from [employer]    Housing Stability Vital Sign     Unable to Pay for Housing in the Last Year: No     Number of Places Lived in the Last Year: 1     Unstable Housing in the Last Year: No       Allergies   Allergen Reactions    Iodinated Contrast Media Unknown    Iodine Nausea Only and Rash          Current Outpatient Medications:     blood sugar diagnostic (Blood Glucose Test) strip, USE as DIRECTED, Disp: 100 strip, Rfl: 1    blood sugar diagnostic (True Metrix Glucose Test Strip) strip, 1 strip 3 times a day., Disp: 100 strip, Rfl: 3    clobetasol (Temovate) 0.05 % cream, Apply topically 2 times a day., Disp: 60 g, Rfl: 1    gabapentin (Neurontin) 600 mg tablet, Take 2 tablets (1,200 mg) by mouth 2 times a day., Disp: 120 tablet, Rfl: 0    lisinopril 2.5 mg tablet, Take 1 tablet (2.5 mg) by mouth once daily., Disp: 90 tablet, Rfl: 1    metoprolol succinate XL (Toprol-XL) 25 mg 24 hr tablet, Take 1 tablet (25 mg) by mouth once daily. DO NOT CRUSH OR CHEW, Disp: 90 tablet, Rfl: 1    sertraline (Zoloft) 50 mg tablet, Take 1 tablet (50 mg) by mouth once daily., Disp: 90 tablet, Rfl: 1    simvastatin (Zocor) 10 mg tablet, TAKE 1 TABLET BY MOUTH ONCE DAILY AT BEDTIME, Disp: 90 tablet, Rfl: 1    thyroid, pork, (NP Thyroid) 30 mg tablet, Take 1 tablet (30 mg) by mouth once daily., Disp: 90 tablet, Rfl: 1    tirzepatide (Mounjaro) 5 mg/0.5 mL pen injector, Inject 5 mg under the skin 1 (one) time per week., Disp: 2 mL, Rfl: 1    traMADol (Ultram) 50 mg tablet, Take 1 tablet (50 mg) by mouth every 6 hours if needed for severe pain (7 - 10)., Disp: 30 tablet, Rfl: 0    triamcinolone (Kenalog) 0.1 % cream, Apply topically 2 times a day. Apply to affected area 1-2 times daily as needed., Disp: 453.6 g, Rfl: 0     Objective    BSA: 1.62 meters squared  /68 (BP Location: Left arm, Patient Position: Sitting, BP Cuff Size: Adult)   Pulse 71   Temp 36.2 °C (97.2 °F) (Temporal)   Resp 18   Wt 60.1 kg (132 lb 9.6 oz)   LMP  (LMP Unknown)   SpO2 96%   BMI 24.25 kg/m²     Performance Status:  The ECOG performance scale today is ECO- Fully active, able to carry on all pre-disease performance w/o restriction.    Physical Exam  Vitals reviewed.   Constitutional:       General: She is awake. She is not in  acute distress.     Appearance: Normal appearance. She is not ill-appearing.   HENT:      Mouth/Throat:      Pharynx: Oropharynx is clear. No oropharyngeal exudate.   Eyes:      General: No scleral icterus.     Conjunctiva/sclera: Conjunctivae normal.   Neck:      Trachea: Trachea and phonation normal. No tracheal tenderness.   Cardiovascular:      Rate and Rhythm: Normal rate and regular rhythm.      Heart sounds: No murmur heard.     No friction rub. No gallop.   Pulmonary:      Effort: Pulmonary effort is normal. No respiratory distress.      Breath sounds: Normal breath sounds. No stridor. No wheezing, rhonchi or rales.   Chest:      Chest wall: No mass, lacerations, deformity or tenderness.   Breasts:     Right: Skin change (due to xrt and surgery) present. No swelling, mass, nipple discharge or tenderness.      Left: No swelling, mass, nipple discharge, skin change or tenderness.   Abdominal:      General: Abdomen is flat. There is no distension.      Palpations: Abdomen is soft. There is no mass.      Tenderness: There is no abdominal tenderness.   Lymphadenopathy:      Cervical: No cervical adenopathy.      Upper Body:      Right upper body: No supraclavicular, axillary or pectoral adenopathy.      Left upper body: No supraclavicular, axillary or pectoral adenopathy.   Skin:     General: Skin is warm and dry.      Coloration: Skin is not jaundiced.      Findings: No lesion or rash.   Neurological:      General: No focal deficit present.      Mental Status: She is alert and oriented to person, place, and time.      Motor: No weakness.      Gait: Gait normal.   Psychiatric:         Mood and Affect: Mood normal.         Thought Content: Thought content normal.         Judgment: Judgment normal.         Laboratory Data:  Lab Results   Component Value Date    WBC 6.7 04/11/2025    HGB 12.8 04/11/2025    HCT 38.0 04/11/2025    MCV 89.6 04/11/2025     04/11/2025       Chemistry    Lab Results   Component  Value Date/Time     04/11/2025 0937    K 4.0 04/11/2025 0937     04/11/2025 0937    CO2 29 04/11/2025 0937    BUN 21 04/11/2025 0937    CREATININE 1.27 (H) 04/11/2025 0937    Lab Results   Component Value Date/Time    CALCIUM 9.6 04/11/2025 0937    ALKPHOS 64 04/11/2025 0937    AST 18 04/11/2025 0937    ALT 11 04/11/2025 0937    BILITOT 0.4 04/11/2025 0937             Radiology:  BI mammo left diagnostic tomosynthesis, BI US breast limited left  Narrative: Interpreted By:  Alonso Bardales,   STUDY:  BI MAMMO LEFT DIAGNOSTIC TOMOSYNTHESIS; BI US BREAST LIMITED LEFT;  2/11/2025 10:27 am; 2/11/2025 11:35 am      ACCESSION NUMBER(S):  [id number]; [id number]      ORDERING CLINICIAN:  PREETI SKINNER      INDICATION:  Left breast BI-RADS 0 screening mammogram for an area of asymmetry  and possible distortion. Sternal lump.      ,R92.8 Other abnormal and inconclusive findings on diagnostic imaging  of breast      COMPARISON:  Mammograms of 01/24/2025, 01/23/2024, 01/18/2023. CT radiation  therapy planning of 04/24/2023.      FINDINGS:  MAMMOGRAPHY: Spot compression tomosynthesis views of the left breast  central superior aspect were obtained in the CC and MLO projections.      Density:  There are scattered areas of fibroglandular density.      Areas of asymmetry in the left breast central upper aspect are  slightly less conspicuous on spot compression views. Few scattered  small calcifications are unchanged.      ULTRASOUND: Targeted ultrasound was performed of the left breast  central upper aspect by a registered sonographer .  Survey of the  sternum region was performed at site of reported lump.      Sonographic survey in the sternum region at the site of reported lump  appears to correspond to the xiphoid process. No additional solid or  cystic lesion is seen in this vicinity.      Sonographic survey of the left breast central upper aspect shows  nonspecific soft tissues. No discrete solid or cystic  lesion is  identified.      Impression: Left breast areas of asymmetry in the central upper aspect are less  conspicuous on spot compression views. No corresponding abnormality  identified in this region on ultrasound. Finding most likely relates  to overlapping fibroglandular tissues.      Palpable lump in the sternum region appears to correspond to the  xiphoid process. No additional solid or cystic lesion in this  vicinity.      BI-RADS CATEGORY:  BI-RADS Category:  2 Benign.  Recommendation:  Clinical Follow-up and Continued Annual Screening.  Recommended Date:  1 Year.  Laterality:  Bilateral.      For any future breast imaging appointments, please call [phone]).          MACRO:  None      Signed by: Alonso Bardales 2/11/2025 12:02 PM  Dictation workstation:   [id number]       BI MAMMO BILATERAL SCREENING TOMOSYNTHESIS     Narrative  MRN: [id number]  Patient Name: ED ZUNIGA    STUDY:  DIGITAL MAMM SCREENING W/ MARIIA;  1/18/2023 11:50 am    ACCESSION NUMBER(S):  [id number]    ORDERING CLINICIAN:  BHAVYA WILLOUGHBY    INDICATION:  Screening. Benign right core biopsy. Right breast itching.    COMPARISON:  02/11/2021, 11/06/2017, 07/28/2015    FINDINGS:  2D and tomosynthesis images were reviewed at 1 mm slice thickness.    There are areas of scattered fibroglandular tissue.  There is a  spiculated mass in the upper outer right breast at middle depth.  Bilateral benign similar-appearing circumscribed and obscured masses  are noted. No suspicious masses or calcifications are identified in  the left breast.    IMPRESSION:  1. Right breast spiculated mass. Targeted ultrasound is recommended.  2. Right breast itching. Clinical correlation is recommended. There  is a focal area of concern, targeted ultrasound is recommended.  3. No mammographic evidence of malignancy in the left breast.    BI-RADS CATEGORY:    Category: 0 - Incomplete; Need Additional Imaging Evaluation and/or  Prior Mammograms for  Comparison.  Recommendation: Ultrasound Recommended.    For any future breast imaging appointments, please call [phone]  ([phone]).    Patient letter sent SADEVAL      Electronically signed by: SCOTT HA MD          Assessment/Plan:    Alejandra Chowdhury is a 70 y.o. female with a history of right ILC breast cancer, who presents today follow-up evaluation.    Invasive Lobular Carcinoma   Due to side effects from anastrozole and exemestane (kidney dysfunction) on observation only   Mammogram 1/2024: cat 4 - bx negative -fat necrosis & hemosiderin-laden macrophage. Continue yearly screenings.   - Mammogram 1/24/2025: left breast asymmetry - follow up imaging benign. Continue with yearly screening mammogram after 1/24/2026   - Bone pain/Fatigue: currently being worked up for lupus/auto-immune however would be remised if we did not r/o possible bone recurrence. Bone scan ordered stat. Will see sooner if needed     Assess/Plan SmartLinks:   Problem List Items Addressed This Visit           ICD-10-CM    Invasive lobular carcinoma of breast in female C50.919    Relevant Orders    NM bone whole body    Clinic Appointment Request PREETI SKINNER; [facility] [id number]     Other Visit Diagnoses         Codes      Bone pain    -  Primary M89.8X9    Relevant Orders    NM bone whole body      Other fatigue     R53.83    Relevant Orders    NM bone whole body            Disposition.  - RTC 6 months with Preeti Skinner PA-C . Sooner pending scan   - She was given our contact information and instructed to call with concerns/questions in the interim.    Orders Placed This Encounter   Procedures    NM bone whole body     Standing Status:   Future     Expected Date:   5/6/2025     Expiration Date:   5/6/2026     Reason for exam::   new bone pain/ worsening fatigue / hx of breast cancer     Radiologist to Determine Optimal Study:   Yes     Release result to [facility]:   Immediate [1]     Is this exam part of a Research  Study? If Yes, link this order to the research study:   No      Misty Fiore PA-C  Hematology and Medical Oncology  [facility]

## 2025-05-05 NOTE — TELEPHONE ENCOUNTER
Recent Visits  Date Type Provider Dept   04/18/25 Office Visit Ross Scales MD [facility]   03/11/25 Office Visit Cm Hanley DO [facility]   Showing recent visits within past 90 days and meeting all other requirements  Future Appointments  Date Type Provider Dept   05/30/25 Appointment Cm Hanley,  [facility]   Showing future appointments within next 90 days and meeting all other requirements

## 2025-05-06 ENCOUNTER — OFFICE VISIT (OUTPATIENT)
Dept: HEMATOLOGY/ONCOLOGY | Facility: CLINIC | Age: 70
End: 2025-05-06
Payer: MEDICARE

## 2025-05-06 VITALS
SYSTOLIC BLOOD PRESSURE: 108 MMHG | RESPIRATION RATE: 18 BRPM | WEIGHT: 132.6 LBS | DIASTOLIC BLOOD PRESSURE: 68 MMHG | HEART RATE: 71 BPM | TEMPERATURE: 97.2 F | OXYGEN SATURATION: 96 % | BODY MASS INDEX: 24.25 KG/M2

## 2025-05-06 DIAGNOSIS — R53.83 OTHER FATIGUE: ICD-10-CM

## 2025-05-06 DIAGNOSIS — M89.8X9 BONE PAIN: Primary | ICD-10-CM

## 2025-05-06 DIAGNOSIS — C50.919 INVASIVE LOBULAR CARCINOMA OF BREAST IN FEMALE: ICD-10-CM

## 2025-05-06 PROCEDURE — 99214 OFFICE O/P EST MOD 30 MIN: CPT

## 2025-05-06 PROCEDURE — 3078F DIAST BP <80 MM HG: CPT

## 2025-05-06 PROCEDURE — 1036F TOBACCO NON-USER: CPT

## 2025-05-06 PROCEDURE — 1159F MED LIST DOCD IN RCRD: CPT

## 2025-05-06 PROCEDURE — 3074F SYST BP LT 130 MM HG: CPT

## 2025-05-06 PROCEDURE — 1123F ACP DISCUSS/DSCN MKR DOCD: CPT

## 2025-05-06 PROCEDURE — 4010F ACE/ARB THERAPY RXD/TAKEN: CPT

## 2025-05-06 PROCEDURE — 3044F HG A1C LEVEL LT 7.0%: CPT

## 2025-05-06 PROCEDURE — 1126F AMNT PAIN NOTED NONE PRSNT: CPT

## 2025-05-06 ASSESSMENT — PAIN SCALES - GENERAL: PAINLEVEL_OUTOF10: 0-NO PAIN

## 2025-05-08 ENCOUNTER — HOSPITAL ENCOUNTER (OUTPATIENT)
Dept: RADIOLOGY | Facility: CLINIC | Age: 70
Discharge: HOME | End: 2025-05-08
Payer: MEDICARE

## 2025-05-08 ENCOUNTER — TELEPHONE (OUTPATIENT)
Dept: HEMATOLOGY/ONCOLOGY | Facility: CLINIC | Age: 70
End: 2025-05-08
Payer: MEDICARE

## 2025-05-08 DIAGNOSIS — Z91.041 CONTRAST MEDIA ALLERGY: ICD-10-CM

## 2025-05-08 DIAGNOSIS — M89.8X9 BONE PAIN: ICD-10-CM

## 2025-05-08 DIAGNOSIS — R53.83 OTHER FATIGUE: ICD-10-CM

## 2025-05-08 DIAGNOSIS — R94.8 ABNORMAL BONE SCAN OF LUMBAR SPINE: Primary | ICD-10-CM

## 2025-05-08 DIAGNOSIS — C50.919 INVASIVE LOBULAR CARCINOMA OF BREAST IN FEMALE: ICD-10-CM

## 2025-05-08 PROCEDURE — 3430000001 HC RX 343 DIAGNOSTIC RADIOPHARMACEUTICALS

## 2025-05-08 PROCEDURE — 78306 BONE IMAGING WHOLE BODY: CPT

## 2025-05-08 PROCEDURE — A9503 TC99M MEDRONATE: HCPCS

## 2025-05-08 RX ORDER — DIPHENHYDRAMINE HCL 50 MG
CAPSULE ORAL
Qty: 1 CAPSULE | Refills: 0 | Status: SHIPPED | OUTPATIENT
Start: 2025-05-08 | End: 2025-05-18

## 2025-05-08 RX ORDER — PREDNISONE 50 MG/1
TABLET ORAL
Qty: 3 TABLET | Refills: 0 | Status: SHIPPED | OUTPATIENT
Start: 2025-05-08 | End: 2025-05-18

## 2025-05-08 RX ADMIN — TECHNETIUM TC 99M MEDRONATE 25.6 MILLICURIE: 25 INJECTION, POWDER, FOR SOLUTION INTRAVENOUS at 07:59

## 2025-05-08 NOTE — TELEPHONE ENCOUNTER
Called pt to discuss the following results:   NM bone whole body  Narrative: Interpreted By:  Shamar Carroll and Gupta Jayesh   STUDY:  NM BONE WHOLE BODY;  5/8/2025 11:41 am      INDICATION:  Signs/Symptoms:new bone pain/ worsening fatigue / hx of breast cancer.      COMPARISON:  None.      ACCESSION NUMBER(S):  [id number]      ORDERING CLINICIAN:  PREETI SKINNER      TECHNIQUE:  DIVISION OF NUCLEAR MEDICINE  BONE SCAN, WHOLE BODY plus REGIONAL VIEWS      The patient received an intravenous dose of  25.6 mCi of Tc-99m MDP.  Anterior and posterior images of the skeleton from skull vertex to  feet were then acquired.  Additional regional skeletal images were  also obtained.      FINDINGS:  There is increased radiotracer uptake seen within the right L2-L3 and  left L5-S1 region.      Expected, excreted activity is noted in the kidneys and bladder.      Increased radiotracer uptake is seen in the bilateral shoulders,  sternoclavicular joints, right toe, most consistent with an arthritic  pattern.      Postsurgical changes of left below-knee amputation.      Nonspecific radiotracer uptake is noted within the soft tissues of  the right breast.      Impression: 1. Increased radiotracer uptake seen within the mid lumbar spine  which can be seen in the setting of degenerative disease and/or  compression fractures. However, recommend further correlation with  dedicated lumbar spine anatomic imaging to rule out metastatic  disease.  2. Radiotracer uptake is seen in an arthritic pattern as described  above.      I personally reviewed the images/study and I agree with the findings  as stated by Alonzo Vivas MD. This study was interpreted at  [facility], [place], [place].      MACRO:  None      Signed by: Shamar Carroll 5/8/2025 2:10 PM  Dictation workstation:   [id number]     Will order a lumbar mri to better distinguish the radiotracer uptake, premeds sent for contrast allergy. No further  questions at this time

## 2025-05-09 ENCOUNTER — LAB (OUTPATIENT)
Dept: LAB | Facility: HOSPITAL | Age: 70
End: 2025-05-09
Payer: MEDICARE

## 2025-05-09 LAB
ALBUMIN SERPL BCP-MCNC: 4.2 G/DL (ref 3.4–5)
ALP SERPL-CCNC: 72 U/L (ref 33–136)
ALT SERPL W P-5'-P-CCNC: 11 U/L (ref 7–45)
ANION GAP SERPL CALC-SCNC: 11 MMOL/L (ref 10–20)
AST SERPL W P-5'-P-CCNC: 16 U/L (ref 9–39)
BASOPHILS # BLD AUTO: 0.06 X10*3/UL (ref 0–0.1)
BASOPHILS NFR BLD AUTO: 0.9 %
BILIRUB SERPL-MCNC: 0.4 MG/DL (ref 0–1.2)
BUN SERPL-MCNC: 19 MG/DL (ref 6–23)
CALCIUM SERPL-MCNC: 9.7 MG/DL (ref 8.6–10.3)
CHLORIDE SERPL-SCNC: 103 MMOL/L (ref 98–107)
CO2 SERPL-SCNC: 31 MMOL/L (ref 21–32)
CREAT SERPL-MCNC: 1.24 MG/DL (ref 0.5–1.05)
EGFRCR SERPLBLD CKD-EPI 2021: 47 ML/MIN/1.73M*2
EOSINOPHIL # BLD AUTO: 0.55 X10*3/UL (ref 0–0.7)
EOSINOPHIL NFR BLD AUTO: 8.7 %
ERYTHROCYTE [DISTWIDTH] IN BLOOD BY AUTOMATED COUNT: 13.7 % (ref 11.5–14.5)
GLUCOSE SERPL-MCNC: 88 MG/DL (ref 74–99)
HCT VFR BLD AUTO: 41.4 % (ref 36–46)
HGB BLD-MCNC: 13.2 G/DL (ref 12–16)
IMM GRANULOCYTES # BLD AUTO: 0.01 X10*3/UL (ref 0–0.7)
IMM GRANULOCYTES NFR BLD AUTO: 0.2 % (ref 0–0.9)
LYMPHOCYTES # BLD AUTO: 1.13 X10*3/UL (ref 1.2–4.8)
LYMPHOCYTES NFR BLD AUTO: 17.8 %
MCH RBC QN AUTO: 30 PG (ref 26–34)
MCHC RBC AUTO-ENTMCNC: 31.9 G/DL (ref 32–36)
MCV RBC AUTO: 94 FL (ref 80–100)
MONOCYTES # BLD AUTO: 0.76 X10*3/UL (ref 0.1–1)
MONOCYTES NFR BLD AUTO: 12 %
NEUTROPHILS # BLD AUTO: 3.84 X10*3/UL (ref 1.2–7.7)
NEUTROPHILS NFR BLD AUTO: 60.4 %
NRBC BLD-RTO: 0 /100 WBCS (ref 0–0)
PLATELET # BLD AUTO: 239 X10*3/UL (ref 150–450)
POTASSIUM SERPL-SCNC: 4 MMOL/L (ref 3.5–5.3)
PROT SERPL-MCNC: 6.7 G/DL (ref 6.4–8.2)
RBC # BLD AUTO: 4.4 X10*6/UL (ref 4–5.2)
SODIUM SERPL-SCNC: 141 MMOL/L (ref 136–145)
WBC # BLD AUTO: 6.4 X10*3/UL (ref 4.4–11.3)

## 2025-05-09 PROCEDURE — 80053 COMPREHEN METABOLIC PANEL: CPT

## 2025-05-09 PROCEDURE — 85025 COMPLETE CBC W/AUTO DIFF WBC: CPT

## 2025-05-11 ENCOUNTER — HOSPITAL ENCOUNTER (OUTPATIENT)
Dept: RADIOLOGY | Facility: HOSPITAL | Age: 70
End: 2025-05-11
Payer: MEDICARE

## 2025-05-11 DIAGNOSIS — R94.8 ABNORMAL BONE SCAN OF LUMBAR SPINE: ICD-10-CM

## 2025-05-11 PROCEDURE — 72158 MRI LUMBAR SPINE W/O & W/DYE: CPT

## 2025-05-11 PROCEDURE — 2550000001 HC RX 255 CONTRASTS

## 2025-05-11 PROCEDURE — 72158 MRI LUMBAR SPINE W/O & W/DYE: CPT | Performed by: RADIOLOGY

## 2025-05-11 PROCEDURE — A9575 INJ GADOTERATE MEGLUMI 0.1ML: HCPCS

## 2025-05-11 RX ORDER — GADOTERATE MEGLUMINE 376.9 MG/ML
12 INJECTION INTRAVENOUS
Status: COMPLETED | OUTPATIENT
Start: 2025-05-11 | End: 2025-05-11

## 2025-05-11 RX ADMIN — GADOTERATE MEGLUMINE 12 ML: 376.9 INJECTION INTRAVENOUS at 08:25

## 2025-05-13 ENCOUNTER — TELEPHONE (OUTPATIENT)
Dept: HEMATOLOGY/ONCOLOGY | Facility: CLINIC | Age: 70
End: 2025-05-13
Payer: MEDICARE

## 2025-05-13 DIAGNOSIS — Q06.8: Primary | ICD-10-CM

## 2025-05-13 DIAGNOSIS — M47.816 OSTEOARTHRITIS OF LUMBAR SPINE, UNSPECIFIED SPINAL OSTEOARTHRITIS COMPLICATION STATUS: ICD-10-CM

## 2025-05-13 NOTE — TELEPHONE ENCOUNTER
Called pt to review the following results:     MR lumbar spine w and wo IV contrast  Narrative: Interpreted By:  Ross Agosto,   STUDY:  MR LUMBAR SPINE W AND WO IV CONTRAST;  5/11/2025 8:25 am      INDICATION:  Signs/Symptoms:anatomic imaging recommended for uptake in the lumbar  spine on bone scan. ,R94.8 Abnormal results of function studies of  other organs and systems      COMPARISON:  None.      ACCESSION NUMBER(S):  [id number]      ORDERING CLINICIAN:  PREETI SKINNER      TECHNIQUE:  The lumbar spine was studied in the sagital, axial and coronal planes  utiliing T1 and T2 weighted images.   Following intravenous injection  of gadolinium contrast, T1 weighted fat suppressed multiplanar images  were also performed.      FINDINGS:  There is loss of height and signal with abnormal marrow signal in the  adjacent vertebral bodies at L2/L3. The marrow signal and vertebral  body height are otherwise normal. The cord is tethered at the  cul-de-sac. The conus is best appreciated at L5/S1 on axial T2  weighted image series 8, image 7/11. The lower portion of the conus  is divided at L3 and L4 best appreciated on axial images such as T2  series 7, image 23/48. Images at each interspace reveal the following:  T12/L1  There is normal alignment and vertebral body height. The disc space  is normal. There is no evidence of canal or foraminal narrowing.  There is no evidence of bulging or herniated disc. L1/L2  There is normal alignment and vertebral body height. The disc space  is normal. There is no evidence of canal or foraminal narrowing.  There is no evidence of bulging or herniated disc. L2/L3  Loss of height and signal at the intervertebral disc space with  abnormal marrow signal in the adjacent endplates. No associated  epidural fluid collection or meningeal enhancement. Mild paraspinal  enhancement at the interspaces especially toward the right side  without evidence of fluid collection or paraspinal abscess.  Cortical  margins at the interspace are intact. Findings are most consistent  with discogenic marrow signal changes. No evidence of metastasis,  discitis or osteomyelitis. 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 Diastematomyelia not excluded *Abnormal  signal and enhancement at L2/L3 are most consistent with degenerative  changes. No evidence of discitis or osteomyelitis *No abnormal marrow  signal to suggest metastases      MACRO:  Critical Finding:  See findings. Notification was initiated on  5/13/2025 at 9:09 am by  Ross Agosto.  (**-OCF-**)      Signed by: Ross Agosto 5/13/2025 9:09 AM  Dictation workstation:   [id number]       Referral placed to spinal surgery. No evidence of malignancy.  No further questions at this time

## 2025-05-20 ENCOUNTER — HOSPITAL ENCOUNTER (OUTPATIENT)
Dept: RADIOLOGY | Facility: CLINIC | Age: 70
Discharge: HOME | End: 2025-05-20
Payer: MEDICARE

## 2025-05-20 ENCOUNTER — OFFICE VISIT (OUTPATIENT)
Dept: ORTHOPEDIC SURGERY | Facility: CLINIC | Age: 70
End: 2025-05-20
Payer: MEDICARE

## 2025-05-20 DIAGNOSIS — M47.816 OSTEOARTHRITIS OF LUMBAR SPINE, UNSPECIFIED SPINAL OSTEOARTHRITIS COMPLICATION STATUS: ICD-10-CM

## 2025-05-20 DIAGNOSIS — Q06.8: ICD-10-CM

## 2025-05-20 DIAGNOSIS — M54.50 LUMBAR PAIN: Primary | ICD-10-CM

## 2025-05-20 PROCEDURE — 72120 X-RAY BEND ONLY L-S SPINE: CPT

## 2025-05-20 PROCEDURE — 99212 OFFICE O/P EST SF 10 MIN: CPT | Performed by: ORTHOPAEDIC SURGERY

## 2025-05-20 PROCEDURE — 99213 OFFICE O/P EST LOW 20 MIN: CPT | Performed by: ORTHOPAEDIC SURGERY

## 2025-05-20 PROCEDURE — 72110 X-RAY EXAM L-2 SPINE 4/>VWS: CPT | Performed by: ORTHOPAEDIC SURGERY

## 2025-05-20 PROCEDURE — 4010F ACE/ARB THERAPY RXD/TAKEN: CPT | Performed by: ORTHOPAEDIC SURGERY

## 2025-05-20 PROCEDURE — 3044F HG A1C LEVEL LT 7.0%: CPT | Performed by: ORTHOPAEDIC SURGERY

## 2025-05-30 ENCOUNTER — APPOINTMENT (OUTPATIENT)
Dept: PRIMARY CARE | Facility: CLINIC | Age: 70
End: 2025-05-30
Payer: MEDICARE

## 2025-05-30 ENCOUNTER — TELEPHONE (OUTPATIENT)
Dept: PRIMARY CARE | Facility: CLINIC | Age: 70
End: 2025-05-30

## 2025-05-30 VITALS
HEART RATE: 77 BPM | RESPIRATION RATE: 16 BRPM | OXYGEN SATURATION: 94 % | WEIGHT: 133.6 LBS | BODY MASS INDEX: 24.59 KG/M2 | SYSTOLIC BLOOD PRESSURE: 128 MMHG | HEIGHT: 62 IN | TEMPERATURE: 97.3 F | DIASTOLIC BLOOD PRESSURE: 66 MMHG

## 2025-05-30 DIAGNOSIS — Z79.899 MEDICATION MANAGEMENT: ICD-10-CM

## 2025-05-30 DIAGNOSIS — G54.6 PHANTOM LIMB PAIN: ICD-10-CM

## 2025-05-30 DIAGNOSIS — I10 BENIGN ESSENTIAL HYPERTENSION: ICD-10-CM

## 2025-05-30 DIAGNOSIS — E78.5 DM TYPE 2 WITH DIABETIC DYSLIPIDEMIA (MULTI): ICD-10-CM

## 2025-05-30 DIAGNOSIS — J30.1 SEASONAL ALLERGIC RHINITIS DUE TO POLLEN: ICD-10-CM

## 2025-05-30 DIAGNOSIS — R76.8 POSITIVE ANA (ANTINUCLEAR ANTIBODY): ICD-10-CM

## 2025-05-30 DIAGNOSIS — M47.26 OSTEOARTHRITIS OF SPINE WITH RADICULOPATHY, LUMBAR REGION: ICD-10-CM

## 2025-05-30 DIAGNOSIS — Z89.512 S/P BKA (BELOW KNEE AMPUTATION) UNILATERAL, LEFT (MULTI): ICD-10-CM

## 2025-05-30 DIAGNOSIS — N18.32 CHRONIC KIDNEY DISEASE, STAGE 3B (MULTI): ICD-10-CM

## 2025-05-30 DIAGNOSIS — E11.69 DM TYPE 2 WITH DIABETIC DYSLIPIDEMIA (MULTI): ICD-10-CM

## 2025-05-30 DIAGNOSIS — Q06.9 CONGENITAL ANOMALY OF SPINAL CORD (MULTI): ICD-10-CM

## 2025-05-30 DIAGNOSIS — E78.5 DM TYPE 2 WITH DIABETIC DYSLIPIDEMIA (MULTI): Primary | ICD-10-CM

## 2025-05-30 DIAGNOSIS — C50.511 MALIGNANT NEOPLASM OF LOWER-OUTER QUADRANT OF RIGHT FEMALE BREAST, UNSPECIFIED ESTROGEN RECEPTOR STATUS: ICD-10-CM

## 2025-05-30 DIAGNOSIS — M25.50 ARTHRALGIA, UNSPECIFIED JOINT: ICD-10-CM

## 2025-05-30 DIAGNOSIS — E78.2 MIXED HYPERLIPIDEMIA: ICD-10-CM

## 2025-05-30 DIAGNOSIS — E11.69 DM TYPE 2 WITH DIABETIC DYSLIPIDEMIA (MULTI): Primary | ICD-10-CM

## 2025-05-30 DIAGNOSIS — M35.9 CONNECTIVE TISSUE DISEASE (MULTI): ICD-10-CM

## 2025-05-30 PROCEDURE — G2211 COMPLEX E/M VISIT ADD ON: HCPCS | Performed by: FAMILY MEDICINE

## 2025-05-30 PROCEDURE — 99214 OFFICE O/P EST MOD 30 MIN: CPT | Performed by: FAMILY MEDICINE

## 2025-05-30 RX ORDER — PREDNISONE 10 MG/1
TABLET ORAL
Qty: 25 TABLET | Refills: 0 | Status: SHIPPED | OUTPATIENT
Start: 2025-05-30

## 2025-05-30 ASSESSMENT — ENCOUNTER SYMPTOMS
OCCASIONAL FEELINGS OF UNSTEADINESS: 0
LOSS OF SENSATION IN FEET: 0
DEPRESSION: 0

## 2025-05-30 NOTE — TELEPHONE ENCOUNTER
UMANGI per BRENNAN-  Dr. Harrison is scheduling into August. Pt is scheduled 8/13 and is on cancellation list for their office.  Pt is requesting to have lab requisitions placed as discussed while waiting for scheduled appt to see Rheumatologist.

## 2025-05-30 NOTE — PROGRESS NOTES
"Subjective   Patient ID: Alejandra Chowdhury \"Jan\" is a 70 y.o. female who presents for Results (Blood work results from 05/09/2025 needs to be discussed today.).  History of Present Illness  The patient is a 70-year-old female who presents for review of laboratory results from 05/09/2025, otherwise had no complaints.    She reports experiencing generalized body soreness and muscle spasms in her legs, which she initially suspected to be lupus due to her mother's history with the disease. The pain is not localized to the joints but is widespread throughout her body. A few nights ago, she experienced severe pain that caused her to wake up crying. She has found some relief from using a TENS unit. Despite sleeping for 8 to 9 hours per night, she experiences constant fatigue and often falls back asleep after waking up and having a cup of coffee.    She has a history of right-sided breast cancer and is currently under the care of Dr. Misty Fiore at the [facility] since 04/2025. She underwent an MRI and PET scan of the lumbar spine, as recommended by Dr. Misty Fiore, and was referred to a spine surgeon who diagnosed her with arthritis. She has not yet consulted a rheumatologist.    She reports no chest pain, gastrointestinal issues, maintains a good appetite, and has regular bowel movements. She also reports no fevers or chills. She has an appointment scheduled with a dermatologist in 6 months for a rash that persisted for 4 months before resolving. She has previously taken steroids without any adverse effects, with the last course being over a year ago.    She is currently on gabapentin, simvastatin, atenolol, lisinopril 2.5 mg, metoprolol, thyroid medication, Mounjaro, Zoloft, and tramadol. She takes tramadol only when the pain becomes unbearable, not on a regular basis. She has not experienced any muscle pain as a side effect of simvastatin.    FAMILY HISTORY  Her mother had lupus.    Review of Systems " "  Constitutional: Negative.  Negative for activity change, appetite change, fatigue and fever.   HENT:  Negative for congestion, dental problem, ear discharge, ear pain, mouth sores, rhinorrhea, sinus pressure, sinus pain, sore throat, tinnitus and trouble swallowing.    Eyes:  Negative for photophobia, pain and visual disturbance.   Respiratory:  Negative for cough, chest tightness, shortness of breath and stridor.    Cardiovascular:  Negative for chest pain and palpitations.   Gastrointestinal:  Negative for abdominal distention, abdominal pain, blood in stool, constipation, diarrhea and rectal pain.   Endocrine: Negative for cold intolerance, heat intolerance, polydipsia, polyphagia and polyuria.   Genitourinary:  Negative for dysuria, flank pain, hematuria and urgency.   Musculoskeletal:  Negative for arthralgias, gait problem, myalgias and neck stiffness.   Skin:  Negative for color change and rash.   Allergic/Immunologic: Negative for environmental allergies and food allergies.   Neurological:  Negative for dizziness, seizures, syncope, speech difficulty and headaches.   Hematological:  Negative for adenopathy.   Psychiatric/Behavioral:  Negative for agitation, confusion, decreased concentration, dysphoric mood and sleep disturbance. The patient is not nervous/anxious.    The above were reviewed and noted negative except as noted in HPI and Problem List.    Objective     /66 (BP Location: Left arm, Patient Position: Sitting, BP Cuff Size: Adult)   Pulse 77   Temp 36.3 °C (97.3 °F) (Temporal)   Resp 16   Ht 1.575 m (5' 2\")   Wt 60.6 kg (133 lb 9.6 oz)   LMP  (LMP Unknown)   SpO2 94%   BMI 24.44 kg/m²      Physical Exam  Musculoskeletal: Pain elicited upon squeezing the muscles.  Constitutional: Well developed, well nourished, alert and in no acute distress   Eyes: Normal external exam. Pupils equally round and reactive to light with normal accommodation and extraocular movements intact.  Neck: " Supple, no lymphadenopathy or masses.   Cardiovascular: Regular rate and rhythm, normal S1 and S2, no murmurs, gallops, or rubs. Radial pulses normal. No peripheral edema.  Pulmonary: No respiratory distress, lungs clear to auscultation bilaterally. No wheezes, rhonchi, rales.  Abdomen: soft,non tender, non distended, without masses or HSM  Skin: Warm, well perfused, normal skin turgor and color.   Neurologic: Cranial nerves II-XII grossly intact.   Psychiatric: Mood calm and affect normal  Musculoskeletal: Moving all extremities without restriction  The above were reviewed and noted negative except as noted in HPI and Problem List.    Problem List Items Addressed This Visit       Benign essential hypertension    DM type 2 with diabetic dyslipidemia (Multi) - Primary    Hyperlipidemia    Allergic rhinitis due to pollen    Status post below-knee amputation of left lower extremity    monitored         Invasive lobular carcinoma of breast in female    monitored         Congenital anomaly of spinal cord (Multi)    Chronic kidney disease, stage 3b (Multi)     Other Visit Diagnoses         Medication management          Arthralgia, unspecified joint        Relevant Medications    predniSONE (Deltasone) 10 mg tablet    Other Relevant Orders    Referral to Rheumatology      Osteoarthritis of spine with radiculopathy, lumbar region          Connective tissue disease (Multi)        Relevant Orders    Referral to Rheumatology             Assessment & Plan  1. Generalized body pain.  - Reports experiencing muscle spasms in her legs and generalized body pain, including joint pain.  - Physical examination reveals tenderness upon squeezing muscles and joints.  - Discussed referral to a rheumatologist for further evaluation; if unable to see within three to four weeks, blood tests including anti-DNA and anti-Ro will be conducted.  - Prescription for prednisone provided with a dosage regimen of three tablets daily for four days,  followed by two tablets daily for the subsequent four days, and finally one tablet daily until completion.    2. Breast cancer.  - History of breast cancer on the right side.  - Currently under the care of Misty Fiore at the cancer center.    3. Fatigue.  - Reports feeling tired all the time despite sleeping 8-9 hours per night.  - No additional physical symptoms such as fevers or chills reported.    4. Medication management.  - Currently taking gabapentin, simvastatin, atenolol, lisinopril 2.5 mg, metoprolol, thyroid medication, Mounjaro, Zoloft, and tramadol as needed for severe pain.  - Advised to continue current medication regimen.    Results  Labs   - White count: 05/09/2025, 6.4   - Hemoglobin: 05/09/2025, 13.2 g   - Hematocrit: 05/09/2025, 41.4%   - Blood sugar: 05/09/2025, 88   - Sodium: 05/09/2025, 141   - Potassium: 05/09/2025, 4.0   - BUN: 05/09/2025, 19   - Creatinine: 05/09/2025, 1.24   - GFR: 05/09/2025, 47   - AST: 05/09/2025, 16   - ALT: 05/09/2025, 11   - Rheumatoid factor: Negative   - Complement studies of connective tissue: Fine   - TELMA: Positive screen with a titer of 1:60   - Sed rate: Normal   - Cholesterol: 197   - HDL: 49   - LDL: 106    Imaging   - MRI of the lumbar spine: 05/13/2025, Severe degenerative changes at all level disk space, irregularity and osteophytes or spurs, no slippage, lumbar lordosis is maintained. No evidence of any infection or osteomyelitis. No metastatic disease.       Cm Hanley DO     This medical note was created with the assistance of artificial intelligence (AI) for documentation purposes. The content has been reviewed and confirmed by the healthcare provider for accuracy and completeness. Patient consented to the use of audio recording and use of AI during their visit.

## 2025-06-03 NOTE — TELEPHONE ENCOUNTER
Pt wondering if BRENNAN would like her to have labs completed? States this was discussed at [facility] last Friday. Please advise.

## 2025-06-11 LAB
ALBUMIN SERPL-MCNC: 4.1 G/DL (ref 3.6–5.1)
ALP SERPL-CCNC: 60 U/L (ref 37–153)
ALT SERPL-CCNC: 14 U/L (ref 6–29)
ANION GAP SERPL CALCULATED.4IONS-SCNC: 10 MMOL/L (CALC) (ref 7–17)
AST SERPL-CCNC: 13 U/L (ref 10–35)
BASOPHILS # BLD AUTO: 59 CELLS/UL (ref 0–200)
BASOPHILS NFR BLD AUTO: 0.6 %
BILIRUB SERPL-MCNC: 0.6 MG/DL (ref 0.2–1.2)
BUN SERPL-MCNC: 30 MG/DL (ref 7–25)
CALCIUM SERPL-MCNC: 9.5 MG/DL (ref 8.6–10.4)
CHLORIDE SERPL-SCNC: 102 MMOL/L (ref 98–110)
CO2 SERPL-SCNC: 29 MMOL/L (ref 20–32)
CREAT SERPL-MCNC: 1.25 MG/DL (ref 0.5–1.05)
DSDNA AB SER-ACNC: <1 IU/ML
EGFRCR SERPLBLD CKD-EPI 2021: 47 ML/MIN/1.73M2
ENA JO1 AB SER IA-ACNC: NORMAL AI
ENA SM+RNP AB SER IA-ACNC: NORMAL AI
ENA SS-A AB SER IA-ACNC: NORMAL AI
EOSINOPHIL # BLD AUTO: 333 CELLS/UL (ref 15–500)
EOSINOPHIL NFR BLD AUTO: 3.4 %
ERYTHROCYTE [DISTWIDTH] IN BLOOD BY AUTOMATED COUNT: 13.7 % (ref 11–15)
EST. AVERAGE GLUCOSE BLD GHB EST-MCNC: 108 MG/DL
EST. AVERAGE GLUCOSE BLD GHB EST-SCNC: 6 MMOL/L
GLUCOSE SERPL-MCNC: 75 MG/DL (ref 65–99)
HBA1C MFR BLD: 5.4 %
HCT VFR BLD AUTO: 43.9 % (ref 35–45)
HGB BLD-MCNC: 14 G/DL (ref 11.7–15.5)
LYMPHOCYTES # BLD AUTO: 1754 CELLS/UL (ref 850–3900)
LYMPHOCYTES NFR BLD AUTO: 17.9 %
MCH RBC QN AUTO: 29.9 PG (ref 27–33)
MCHC RBC AUTO-ENTMCNC: 31.9 G/DL (ref 32–36)
MCV RBC AUTO: 93.8 FL (ref 80–100)
MONOCYTES # BLD AUTO: 1166 CELLS/UL (ref 200–950)
MONOCYTES NFR BLD AUTO: 11.9 %
NEUTROPHILS # BLD AUTO: 6488 CELLS/UL (ref 1500–7800)
NEUTROPHILS NFR BLD AUTO: 66.2 %
PLATELET # BLD AUTO: 242 THOUSAND/UL (ref 140–400)
PMV BLD REES-ECKER: 10.9 FL (ref 7.5–12.5)
POTASSIUM SERPL-SCNC: 4.2 MMOL/L (ref 3.5–5.3)
PROT SERPL-MCNC: 6.7 G/DL (ref 6.1–8.1)
RBC # BLD AUTO: 4.68 MILLION/UL (ref 3.8–5.1)
SODIUM SERPL-SCNC: 141 MMOL/L (ref 135–146)
WBC # BLD AUTO: 9.8 THOUSAND/UL (ref 3.8–10.8)

## 2025-06-12 DIAGNOSIS — E11.69 DM TYPE 2 WITH DIABETIC DYSLIPIDEMIA (MULTI): ICD-10-CM

## 2025-06-12 DIAGNOSIS — E78.5 DM TYPE 2 WITH DIABETIC DYSLIPIDEMIA (MULTI): ICD-10-CM

## 2025-06-12 RX ORDER — TIRZEPATIDE 5 MG/.5ML
INJECTION, SOLUTION SUBCUTANEOUS
Qty: 2 ML | Refills: 1 | Status: SHIPPED | OUTPATIENT
Start: 2025-06-12 | End: 2025-06-13 | Stop reason: SDUPTHER

## 2025-06-12 NOTE — TELEPHONE ENCOUNTER
Recent Visits  Date Type Provider Dept   05/30/25 Office Visit Cm Hanley, DO [facility]   04/18/25 Office Visit Ross Scales MD [facility]   03/11/25 Office Visit Cm Hanley, DO [facility]   Showing recent visits within past 180 days and meeting all other requirements  Future Appointments  Date Type Provider Dept   06/13/25 Appointment Cm Hanley, DO [facility]   08/07/25 Appointment Cm Hanley, DO [facility]   Showing future appointments within next 90 days and meeting all other requirements

## 2025-06-13 ENCOUNTER — APPOINTMENT (OUTPATIENT)
Dept: PRIMARY CARE | Facility: CLINIC | Age: 70
End: 2025-06-13
Payer: MEDICARE

## 2025-06-13 DIAGNOSIS — M81.0 AGE-RELATED OSTEOPOROSIS WITHOUT CURRENT PATHOLOGICAL FRACTURE: ICD-10-CM

## 2025-06-13 DIAGNOSIS — E03.9 ACQUIRED HYPOTHYROIDISM: ICD-10-CM

## 2025-06-13 DIAGNOSIS — M15.9 GENERALIZED OSTEOARTHROSIS: ICD-10-CM

## 2025-06-13 DIAGNOSIS — E11.69 DM TYPE 2 WITH DIABETIC DYSLIPIDEMIA (MULTI): Primary | ICD-10-CM

## 2025-06-13 DIAGNOSIS — M25.50 POLYARTHRALGIA: ICD-10-CM

## 2025-06-13 DIAGNOSIS — E78.2 MIXED HYPERLIPIDEMIA: ICD-10-CM

## 2025-06-13 DIAGNOSIS — I10 BENIGN ESSENTIAL HYPERTENSION: ICD-10-CM

## 2025-06-13 DIAGNOSIS — E78.5 DM TYPE 2 WITH DIABETIC DYSLIPIDEMIA (MULTI): Primary | ICD-10-CM

## 2025-06-13 RX ORDER — PREDNISONE 5 MG/1
TABLET ORAL
Qty: 35 TABLET | Refills: 8 | Status: SHIPPED | OUTPATIENT
Start: 2025-06-13

## 2025-06-13 RX ORDER — TIRZEPATIDE 5 MG/.5ML
5 INJECTION, SOLUTION SUBCUTANEOUS WEEKLY
Qty: 2 ML | Refills: 1 | Status: SHIPPED | OUTPATIENT
Start: 2025-06-13

## 2025-06-13 NOTE — PROGRESS NOTES
"Subjective   Patient ID: Alejandra Chowdhury \"Jan\" is a 70 y.o. female who presents for Results (Patient presents today to discuss recent lab results from 06/10/2025. Denies any concerns at this time ).      Virtual or Telephone Consent    While technically available, the patient was unable or unwilling to consent to connect via audio/video telehealth technology; therefore, I performed this visit using a real-time audio only connection between Alejandra Chowdhury & Cm Hanley DO.  Verbal consent was requested and obtained from Alejandra Chowdhury on this date, 06/14/25 for a telehealth visit and the patient's location was confirmed at the time of the visit.     History of Present Illness  The patient is a 70-year-old white female who presents for discussion of lab results.    She has been experiencing generalized soreness, particularly in the upper part of her body, including her arms, shoulders, and down to her waist. She reports that prednisone provided some relief, reducing her overall soreness and increasing her energy levels. However, upon discontinuation of the medication, the soreness returned. She estimates that prednisone improved her condition by approximately 40 to 50 percent. She has an upcoming appointment with a rheumatologist in 08/2025. She has completed her course of prednisone, which was tapered from 3 to 2 to 1 tablet.    Despite taking Tylenol for headaches, she finds it ineffective for other types of pain. Her current medication regimen includes gabapentin at night, lisinopril 2.5 mg, metoprolol XL 25 mg, Mounjaro 5 mg weekly, sertraline 50 mg daily, simvastatin 10 mg at bedtime, [place] Thyroid 30 mg daily, and tramadol 50 mg as needed every 8 hours.    She is under the care of a nephrologist, whom she consults annually, with the next appointment scheduled for the fall. No changes have been made to her medication regimen.    Review of Systems    Objective     LMP  (LMP Unknown)      Physical Exam  Alert, " oriented x 3, affect pleasant    Problem List Items Addressed This Visit       Age related osteoporosis    Benign essential hypertension    DM type 2 with diabetic dyslipidemia (Multi) - Primary    Relevant Medications    tirzepatide (Mounjaro) 5 mg/0.5 mL pen injector    Hyperlipidemia    Hypothyroidism    Generalized osteoarthrosis     Other Visit Diagnoses         Polyarthralgia        Relevant Medications    predniSONE (Deltasone) 5 mg tablet             Assessment & Plan  1. Lab results review.  - Hemoglobin A1c is commendably at 5.4.  - Sjogren's antibody SSA, DNA double-stranded, RNP antibody, and Brandi-1 are all <1.  - White cell count is 9.8, hemoglobin is 14, and hematocrit is 43.9, indicating no anemia. Slight elevation in monocytes to 1100+, which could suggest a viral infection.  - BUN is 30, creatinine has decreased to 1.25, and GFR has increased to 47, indicating improved kidney function. Neutrophil count is satisfactory, and lymphocytes are within normal range.    2. Joint pain.  - Reports significant relief from joint pain with prednisone, particularly in the upper part of the body, but pain has returned after completing the course.  - Increased pain and limited mobility.  - Advised to monitor pain levels and report any changes, especially if pain increases after reducing the dosage.  - Prescribed prednisone 5 mg once daily, initially taking two tablets daily for five days, then reducing to one tablet daily.    3. Medication management.  - Currently taking gabapentin at night, lisinopril 2.5 mg, metoprolol XL 25 mg, Mounjaro 5 mg weekly, sertraline 50 mg once a day, simvastatin 10 mg at bedtime, [place] Thyroid 30 mg daily, and tramadol 50 mg as needed every 8 hours.  - Prednisone therapy adjusted as mentioned above.    4. Kidney function.  - Kidney function has shown improvement with a decrease in creatinine to 1.25 and an increase in GFR to 47.  - Continues to avoid NSAIDs to protect kidney  function.    Follow-up  The patient will follow up in 3 weeks.    Cm Hanley,      This medical note was created with the assistance of artificial intelligence (AI) for documentation purposes. The content has been reviewed and confirmed by the healthcare provider for accuracy and completeness. Patient consented to the use of audio recording and use of AI during their visit.

## 2025-07-03 ENCOUNTER — PATIENT MESSAGE (OUTPATIENT)
Dept: PRIMARY CARE | Facility: CLINIC | Age: 70
End: 2025-07-03
Payer: MEDICARE

## 2025-07-03 DIAGNOSIS — E03.9 ACQUIRED HYPOTHYROIDISM: ICD-10-CM

## 2025-07-03 DIAGNOSIS — Q06.9 CONGENITAL ANOMALY OF SPINAL CORD (MULTI): ICD-10-CM

## 2025-07-05 RX ORDER — GABAPENTIN 600 MG/1
1200 TABLET ORAL 2 TIMES DAILY
Qty: 180 TABLET | Refills: 0 | Status: SHIPPED | OUTPATIENT
Start: 2025-07-05

## 2025-07-05 RX ORDER — THYROID 30 MG/1
30 TABLET ORAL DAILY
Qty: 90 TABLET | Refills: 1 | Status: SHIPPED | OUTPATIENT
Start: 2025-07-05

## 2025-07-29 ENCOUNTER — APPOINTMENT (OUTPATIENT)
Dept: DERMATOLOGY | Facility: CLINIC | Age: 70
End: 2025-07-29
Payer: MEDICARE

## 2025-08-06 ASSESSMENT — RHEUMATOLOGY NEW PATIENT QUESTIONNAIRE
VAGINAL DRYNESS: Y
CHEST PAIN: N
HOW WOULD YOU DESCRIBE YOUR STIFFNESS ON AVERAGE: MODERATE
UNUSUAL FATIGUE: Y
EYE PAIN: N
SEIZURES: N
JOINT PAIN: Y
HEADACHES: Y
MEMORY LOSS: Y
AGITATION: N
SWOLLEN OR TENDER GLANDS: N
PERSISTENT DIARRHEA: N
FEVER: N
SKIN TIGHTNESS: N
LOSS OF CONSCIOUSNESS: N
HEARTBURN OR REFLUX: Y
ANEMIA: N
EYE REDNESS: N
VOMITING OF BLOOD OR COFFEE GROUND CONSISTENCY MATERIAL: N
UNEXPLAINED HEARING LOSS: N
MUSCLE WEAKNESS: Y
HOARSE VOICE: Y
SUN SENSITIVE (SUN ALLERGY): N
NUMBNESS OR TINGLING IN HANDS OR FEET: Y
JAUNDICE: N
DIFFICULTY FALLING ASLEEP: N
JOINT SWELLING: N
SKIN REDNESS: N
SORES IN MOUTH OR NOSE: N
COUGH: N
EYE DRYNESS: N
NIGHT SWEATS: Y
INCREASED SUSCEPTIBILITY TO INFECTION: N
EASILY LOSING TEMPER: Y
DRYNESS OF MOUTH: Y
RASH OR ULCERS: N
DIFFICULTY STAYING ASLEEP: N
DIFFICULTY SWALLOWING: N
RASH: N
ANXIETY: N
LOSS OF VISION: N
BEHAVIORAL CHANGES: N
UNUSUALLY RAPID OR SLOWED HEART RATE: N
DEPRESSION: N
SHORTNESS OF BREATH: N
UNUSUAL BLEEDING: N
DOUBLE OR BLURRED VISION: N
COLOR CHANGES OF HANDS OR FEET IN THE COLD: Y
PAIN OR BURNING ON URINATION: N
BLACK STOOLS: N
MORNING STIFFNESS: Y
EASY BRUISING: N
UNEXPLAINED WEIGHT CHANGE: N
EXCESSIVE HAIR LOSS (MORE THAN YOUR NORM): Y
STOMACH PAIN: N
FAINTING: N
MORNING STIFFNESS IN LOWER BACK: N
SWOLLEN LEGS OR FEET: N
DIFFICULTY BREATHING LYING DOWN: N
ABNORMAL URINE: N
NODULES/BUMPS: N
BLOOD IN STOOLS: N
NAUSEA: N

## 2025-08-07 ENCOUNTER — APPOINTMENT (OUTPATIENT)
Dept: PRIMARY CARE | Facility: CLINIC | Age: 70
End: 2025-08-07
Payer: MEDICARE

## 2025-08-13 ENCOUNTER — APPOINTMENT (OUTPATIENT)
Facility: CLINIC | Age: 70
End: 2025-08-13
Payer: MEDICARE

## 2025-08-13 VITALS
SYSTOLIC BLOOD PRESSURE: 129 MMHG | HEART RATE: 80 BPM | HEIGHT: 62 IN | DIASTOLIC BLOOD PRESSURE: 78 MMHG | WEIGHT: 134.6 LBS | BODY MASS INDEX: 24.77 KG/M2 | TEMPERATURE: 97.3 F

## 2025-08-13 DIAGNOSIS — E55.9 VITAMIN D INSUFFICIENCY: ICD-10-CM

## 2025-08-13 DIAGNOSIS — M10.9 GOUT, UNSPECIFIED CAUSE, UNSPECIFIED CHRONICITY, UNSPECIFIED SITE: ICD-10-CM

## 2025-08-13 DIAGNOSIS — M25.50 POLYARTHRALGIA: Primary | ICD-10-CM

## 2025-08-13 DIAGNOSIS — R76.8 POSITIVE ANA (ANTINUCLEAR ANTIBODY): ICD-10-CM

## 2025-08-13 DIAGNOSIS — M79.10 MYALGIA: ICD-10-CM

## 2025-08-13 PROCEDURE — 1159F MED LIST DOCD IN RCRD: CPT | Performed by: STUDENT IN AN ORGANIZED HEALTH CARE EDUCATION/TRAINING PROGRAM

## 2025-08-13 PROCEDURE — 3008F BODY MASS INDEX DOCD: CPT | Performed by: STUDENT IN AN ORGANIZED HEALTH CARE EDUCATION/TRAINING PROGRAM

## 2025-08-13 PROCEDURE — 3044F HG A1C LEVEL LT 7.0%: CPT | Performed by: STUDENT IN AN ORGANIZED HEALTH CARE EDUCATION/TRAINING PROGRAM

## 2025-08-13 PROCEDURE — 1160F RVW MEDS BY RX/DR IN RCRD: CPT | Performed by: STUDENT IN AN ORGANIZED HEALTH CARE EDUCATION/TRAINING PROGRAM

## 2025-08-13 PROCEDURE — 3074F SYST BP LT 130 MM HG: CPT | Performed by: STUDENT IN AN ORGANIZED HEALTH CARE EDUCATION/TRAINING PROGRAM

## 2025-08-13 PROCEDURE — 99204 OFFICE O/P NEW MOD 45 MIN: CPT | Performed by: STUDENT IN AN ORGANIZED HEALTH CARE EDUCATION/TRAINING PROGRAM

## 2025-08-13 PROCEDURE — 3078F DIAST BP <80 MM HG: CPT | Performed by: STUDENT IN AN ORGANIZED HEALTH CARE EDUCATION/TRAINING PROGRAM

## 2025-08-13 PROCEDURE — G2211 COMPLEX E/M VISIT ADD ON: HCPCS | Performed by: STUDENT IN AN ORGANIZED HEALTH CARE EDUCATION/TRAINING PROGRAM

## 2025-08-13 PROCEDURE — 1036F TOBACCO NON-USER: CPT | Performed by: STUDENT IN AN ORGANIZED HEALTH CARE EDUCATION/TRAINING PROGRAM

## 2025-08-14 ENCOUNTER — APPOINTMENT (OUTPATIENT)
Dept: PRIMARY CARE | Facility: CLINIC | Age: 70
End: 2025-08-14
Payer: MEDICARE

## 2025-08-17 LAB
25(OH)D3+25(OH)D2 SERPL-MCNC: 37 NG/ML (ref 30–100)
ALBUMIN SERPL-MCNC: 4.3 G/DL (ref 3.6–5.1)
ALDOLASE SERPL-CCNC: 6.7 U/L
ALP SERPL-CCNC: 70 U/L (ref 37–153)
ALT SERPL-CCNC: 11 U/L (ref 6–29)
ANA PAT SER IF-IMP: ABNORMAL
ANA SER QL IF: POSITIVE
ANA TITR SER IF: ABNORMAL TITER
ANION GAP SERPL CALCULATED.4IONS-SCNC: 10 MMOL/L (CALC) (ref 7–17)
AST SERPL-CCNC: 16 U/L (ref 10–35)
BASOPHILS # BLD AUTO: 53 CELLS/UL (ref 0–200)
BASOPHILS NFR BLD AUTO: 0.9 %
BILIRUB SERPL-MCNC: 0.5 MG/DL (ref 0.2–1.2)
BUN SERPL-MCNC: 24 MG/DL (ref 7–25)
CALCIUM SERPL-MCNC: 9.6 MG/DL (ref 8.6–10.4)
CENTROMERE B AB SER-ACNC: ABNORMAL AI
CHLORIDE SERPL-SCNC: 103 MMOL/L (ref 98–110)
CK SERPL-CCNC: 35 U/L (ref 20–243)
CN-1A IGG SERPL QL IA: <5 UNITS
CO2 SERPL-SCNC: 28 MMOL/L (ref 20–32)
CREAT SERPL-MCNC: 1.14 MG/DL (ref 0.5–1.05)
CRP SERPL-MCNC: 13.6 MG/L
DSDNA AB SER-ACNC: <1 IU/ML
EGFRCR SERPLBLD CKD-EPI 2021: 52 ML/MIN/1.73M2
EJ AB SER QL: NORMAL
ENA JO1 AB SER IA-ACNC: ABNORMAL AI
ENA JO1 AB SER QL IB: NORMAL
ENA RNP AB SER-ACNC: ABNORMAL AI
ENA SCL70 AB SER IA-ACNC: ABNORMAL AI
ENA SM AB SER IA-ACNC: ABNORMAL AI
ENA SM+RNP AB SER IA-ACNC: ABNORMAL AI
ENA SS-A AB SER IA-ACNC: ABNORMAL AI
ENA SS-B AB SER IA-ACNC: ABNORMAL AI
EOSINOPHIL # BLD AUTO: 419 CELLS/UL (ref 15–500)
EOSINOPHIL NFR BLD AUTO: 7.1 %
ERYTHROCYTE [DISTWIDTH] IN BLOOD BY AUTOMATED COUNT: 13.4 % (ref 11–15)
ERYTHROCYTE [SEDIMENTATION RATE] IN BLOOD BY WESTERGREN METHOD: 31 MM/H
GLUCOSE SERPL-MCNC: 79 MG/DL (ref 65–139)
HCT VFR BLD AUTO: 40.5 % (ref 35–45)
HGB BLD-MCNC: 13.8 G/DL (ref 11.7–15.5)
HMGCR IGG SER IA-ACNC: <2 CU
LYMPHOCYTES # BLD AUTO: 1162 CELLS/UL (ref 850–3900)
LYMPHOCYTES NFR BLD AUTO: 19.7 %
MCH RBC QN AUTO: 31 PG (ref 27–33)
MCHC RBC AUTO-ENTMCNC: 34.1 G/DL (ref 32–36)
MCV RBC AUTO: 91 FL (ref 80–100)
MDA5 AB SER LINE BLOT-ACNC: NORMAL
MI-2 ALPHA AB SER LINE BLOT-ACNC: NORMAL
MI-2 BETA AB SER LINE BLOT-ACNC: NORMAL
MJ AB SER LINE BLOT-ACNC: NORMAL
MONOCYTES # BLD AUTO: 561 CELLS/UL (ref 200–950)
MONOCYTES NFR BLD AUTO: 9.5 %
NEUTROPHILS # BLD AUTO: 3705 CELLS/UL (ref 1500–7800)
NEUTROPHILS NFR BLD AUTO: 62.8 %
NUCLEOSOME AB SER IA-ACNC: ABNORMAL AI
OJ AB SER QL IB: NORMAL
PL12 AB SER QL IB: NORMAL
PL7 AB SER QL IB: NORMAL
PLATELET # BLD AUTO: 237 THOUSAND/UL (ref 140–400)
PMV BLD REES-ECKER: 11.5 FL (ref 7.5–12.5)
POTASSIUM SERPL-SCNC: 4.1 MMOL/L (ref 3.5–5.3)
PROT SERPL-MCNC: 7 G/DL (ref 6.1–8.1)
RBC # BLD AUTO: 4.45 MILLION/UL (ref 3.8–5.1)
RIBOSOMAL P AB SER-ACNC: ABNORMAL AI
SODIUM SERPL-SCNC: 141 MMOL/L (ref 135–146)
SRP AB SERPL QL IB: NORMAL
TIF1-GAMMA AB SER LINE BLOT-ACNC: NORMAL
URATE SERPL-MCNC: 7.1 MG/DL (ref 2.5–7)
WBC # BLD AUTO: 5.9 THOUSAND/UL (ref 3.8–10.8)

## 2025-08-19 LAB
25(OH)D3+25(OH)D2 SERPL-MCNC: 37 NG/ML (ref 30–100)
ALBUMIN SERPL-MCNC: 4.3 G/DL (ref 3.6–5.1)
ALDOLASE SERPL-CCNC: 6.7 U/L
ALP SERPL-CCNC: 70 U/L (ref 37–153)
ALT SERPL-CCNC: 11 U/L (ref 6–29)
ANA PAT SER IF-IMP: ABNORMAL
ANA SER QL IF: POSITIVE
ANA TITR SER IF: ABNORMAL TITER
ANION GAP SERPL CALCULATED.4IONS-SCNC: 10 MMOL/L (CALC) (ref 7–17)
AST SERPL-CCNC: 16 U/L (ref 10–35)
BASOPHILS # BLD AUTO: 53 CELLS/UL (ref 0–200)
BASOPHILS NFR BLD AUTO: 0.9 %
BILIRUB SERPL-MCNC: 0.5 MG/DL (ref 0.2–1.2)
BUN SERPL-MCNC: 24 MG/DL (ref 7–25)
CALCIUM SERPL-MCNC: 9.6 MG/DL (ref 8.6–10.4)
CENTROMERE B AB SER-ACNC: ABNORMAL AI
CHLORIDE SERPL-SCNC: 103 MMOL/L (ref 98–110)
CK SERPL-CCNC: 35 U/L (ref 20–243)
CN-1A IGG SERPL QL IA: <5 UNITS
CO2 SERPL-SCNC: 28 MMOL/L (ref 20–32)
CREAT SERPL-MCNC: 1.14 MG/DL (ref 0.5–1.05)
CRP SERPL-MCNC: 13.6 MG/L
DSDNA AB SER-ACNC: <1 IU/ML
EGFRCR SERPLBLD CKD-EPI 2021: 52 ML/MIN/1.73M2
EJ AB SER QL: <11 SI
ENA JO1 AB SER IA-ACNC: ABNORMAL AI
ENA JO1 AB SER QL IB: <11 SI
ENA RNP AB SER-ACNC: ABNORMAL AI
ENA SCL70 AB SER IA-ACNC: ABNORMAL AI
ENA SM AB SER IA-ACNC: ABNORMAL AI
ENA SM+RNP AB SER IA-ACNC: ABNORMAL AI
ENA SS-A AB SER IA-ACNC: ABNORMAL AI
ENA SS-B AB SER IA-ACNC: ABNORMAL AI
EOSINOPHIL # BLD AUTO: 419 CELLS/UL (ref 15–500)
EOSINOPHIL NFR BLD AUTO: 7.1 %
ERYTHROCYTE [DISTWIDTH] IN BLOOD BY AUTOMATED COUNT: 13.4 % (ref 11–15)
ERYTHROCYTE [SEDIMENTATION RATE] IN BLOOD BY WESTERGREN METHOD: 31 MM/H
GLUCOSE SERPL-MCNC: 79 MG/DL (ref 65–139)
HCT VFR BLD AUTO: 40.5 % (ref 35–45)
HGB BLD-MCNC: 13.8 G/DL (ref 11.7–15.5)
HMGCR IGG SER IA-ACNC: <2 CU
LYMPHOCYTES # BLD AUTO: 1162 CELLS/UL (ref 850–3900)
LYMPHOCYTES NFR BLD AUTO: 19.7 %
MCH RBC QN AUTO: 31 PG (ref 27–33)
MCHC RBC AUTO-ENTMCNC: 34.1 G/DL (ref 32–36)
MCV RBC AUTO: 91 FL (ref 80–100)
MDA5 AB SER LINE BLOT-ACNC: <11 SI
MI-2 ALPHA AB SER LINE BLOT-ACNC: <11 SI
MI-2 BETA AB SER LINE BLOT-ACNC: <11 SI
MJ AB SER LINE BLOT-ACNC: <11 SI
MONOCYTES # BLD AUTO: 561 CELLS/UL (ref 200–950)
MONOCYTES NFR BLD AUTO: 9.5 %
NEUTROPHILS # BLD AUTO: 3705 CELLS/UL (ref 1500–7800)
NEUTROPHILS NFR BLD AUTO: 62.8 %
NUCLEOSOME AB SER IA-ACNC: ABNORMAL AI
OJ AB SER QL IB: <11 SI
PL12 AB SER QL IB: <11 SI
PL7 AB SER QL IB: <11 SI
PLATELET # BLD AUTO: 237 THOUSAND/UL (ref 140–400)
PMV BLD REES-ECKER: 11.5 FL (ref 7.5–12.5)
POTASSIUM SERPL-SCNC: 4.1 MMOL/L (ref 3.5–5.3)
PROT SERPL-MCNC: 7 G/DL (ref 6.1–8.1)
RBC # BLD AUTO: 4.45 MILLION/UL (ref 3.8–5.1)
RIBOSOMAL P AB SER-ACNC: ABNORMAL AI
SODIUM SERPL-SCNC: 141 MMOL/L (ref 135–146)
SRP AB SERPL QL IB: <11 SI
TIF1-GAMMA AB SER LINE BLOT-ACNC: <11 SI
URATE SERPL-MCNC: 7.1 MG/DL (ref 2.5–7)
WBC # BLD AUTO: 5.9 THOUSAND/UL (ref 3.8–10.8)

## 2025-10-01 ENCOUNTER — APPOINTMENT (OUTPATIENT)
Facility: CLINIC | Age: 70
End: 2025-10-01
Payer: MEDICARE